# Patient Record
Sex: MALE | Race: BLACK OR AFRICAN AMERICAN | Employment: OTHER | URBAN - METROPOLITAN AREA
[De-identification: names, ages, dates, MRNs, and addresses within clinical notes are randomized per-mention and may not be internally consistent; named-entity substitution may affect disease eponyms.]

---

## 2017-04-04 ENCOUNTER — OFFICE VISIT (OUTPATIENT)
Dept: UROLOGY | Age: 57
End: 2017-04-04

## 2017-04-04 VITALS
OXYGEN SATURATION: 97 % | WEIGHT: 189 LBS | BODY MASS INDEX: 30.37 KG/M2 | DIASTOLIC BLOOD PRESSURE: 90 MMHG | SYSTOLIC BLOOD PRESSURE: 154 MMHG | HEIGHT: 66 IN | HEART RATE: 56 BPM

## 2017-04-04 DIAGNOSIS — R31.0 GROSS HEMATURIA: Primary | ICD-10-CM

## 2017-04-04 DIAGNOSIS — Z12.5 SCREENING FOR PROSTATE CANCER: ICD-10-CM

## 2017-04-04 LAB
BILIRUB UR QL STRIP: NORMAL
GLUCOSE UR-MCNC: NEGATIVE MG/DL
KETONES P FAST UR STRIP-MCNC: NEGATIVE MG/DL
PH UR STRIP: 7.5 [PH] (ref 4.6–8)
PROT UR QL STRIP: NORMAL MG/DL
SP GR UR STRIP: 1.02 (ref 1–1.03)
UA UROBILINOGEN AMB POC: NORMAL (ref 0.2–1)
URINALYSIS CLARITY POC: CLEAR
URINALYSIS COLOR POC: YELLOW
URINE BLOOD POC: NORMAL
URINE LEUKOCYTES POC: NEGATIVE
URINE NITRITES POC: NEGATIVE

## 2017-04-04 RX ORDER — GABAPENTIN 800 MG/1
800 TABLET ORAL 2 TIMES DAILY
Refills: 3 | COMMUNITY
Start: 2017-01-07 | End: 2017-06-20

## 2017-04-04 RX ORDER — LOSARTAN POTASSIUM 50 MG/1
50 TABLET ORAL DAILY
Refills: 1 | COMMUNITY
Start: 2017-03-15 | End: 2017-08-18

## 2017-04-04 RX ORDER — NAPROXEN 500 MG/1
500 TABLET ORAL 2 TIMES DAILY WITH MEALS
Refills: 0 | COMMUNITY
Start: 2016-12-27 | End: 2017-06-20

## 2017-04-04 NOTE — PROGRESS NOTES
RBV. Per Dr. Chato Cat lab drawn in office today for PSA for screening for prostate cancer and gross hematuria.    RBV. Per Dr. Chato Cat Patient to have urine sent for cytology.

## 2017-04-04 NOTE — PROGRESS NOTES
Mr. Faisal Faria has a reminder for a \"due or due soon\" health maintenance. I have asked that he contact his primary care provider for follow-up on this health maintenance. RBV. Per Dr. Jam Pressley Patient to have urine sent for cytology.

## 2017-04-04 NOTE — PROGRESS NOTES
Rasheeda     Chief Complaint   Patient presents with   [address] [place] [address] Patient    Gross Hematuria       History and Physical    The patient is a pleasant 56-year-old [place] American male who is sent for evaluation of gross hematuria. This occurred about a month ago. There was no unusual preceding injury or coughing, vomiting. The patient had been doing some pulling and pushing activities but this is pretty much his standard activity. The patient had visible blood in his urine and this continued for 2 or 3 more urinations that day. It cleared up on its own. The patient underwent CT urogram about 3 weeks ago and it was unremarkable. The patient noticed a similar episode about 2 weeks ago. The patient is not aware of whether this was initial hematuria terminal hematuria or total hematuria. There was no sense of obstruction or irritation. The patient does have chronic left flank pain but this is of an exertional and positional nature and his primary care provider has advised that this is muscular. There is no personal or family history of kidney stones and the patient is originally from nearby [place]. There is no family history of prostate issues or sickle cell. The patient denies any childhood issues. Puberty was at normal age. His adult life was totally without any type of genitourinary issues  The patient smokes perhaps 1 pack of cigarettes per week    Past Medical History:   Diagnosis Date    Hypertension      There is no problem list on file for this patient. Past Surgical History:   Procedure Laterality Date    HX ORTHOPAEDIC      spinal fusion     Current Outpatient Prescriptions   Medication Sig Dispense Refill    naproxen (NAPROSYN) 500 mg tablet 500 mg two (2) times daily (with meals). 0    losartan (COZAAR) 50 mg tablet 50 mg daily. 1    gabapentin (NEURONTIN) 800 mg tablet 800 mg two (2) times a day.   3     No Known Allergies  Social History     Social History    Marital status:      Spouse name: N/A    Number of children: N/A    Years of education: N/A     Occupational History    Not on file. Social History Main Topics    Smoking status: Current Every Day Smoker    Smokeless tobacco: Not on file    Alcohol use No    Drug use: No    Sexual activity: Yes     Other Topics Concern    Not on file     Social History Narrative    No narrative on file      Family History   Problem Relation Age of Onset    Cancer Mother     Cancer Father     Cancer Brother     Diabetes Brother            Visit Vitals    Ht 5' 6\" (1.676 m)    Wt 189 lb (85.7 kg)    BMI 30.51 kg/m2     Physical        Gen: WDWN adult NAD  Head  : normocephalic,  Normal ROM; eyes without normal pupils, EOMs, no masses;  conjunctiva normal  Neck: normal movement,  no evident mass,  No evident adenopathy, trachea midline,  Lungs clear to auscultation with no rales or ronchi or rubs  Cardiac NSR with no murmur, rub, extra sounds  Abd :bowel sounds normal, no masses, tenderness, organomegaly  Flanks     -penis is uncircumcised and normal.  Meatus normal.  Scrotal contents normal.  Rectal tone normal.  Prostate gland is 40 g smooth and symmetric with no abnormalities    Extremities- no edema, arthritis, deformity, swelling  Psych- oriented, no evident anxiety, no cognitive impairment evident    Urine today is trace positive for blood micro shows 10-15 red blood cells per high-powered field. There are some granular casts. There do appear to be some epithelial cells.                   Impression/ PLAN  microhematuria with reported episode of gross hematuria; low tobacco consumption history    Plan:  Urine for cytology  PSA  Patient to return with disks so I can review the CT urogram and we will probably need to proceed on with cystoscopy                This visit exceeded 30 minutes and >50% was counselling  The patient understands the discussion and plan    PLEASE NOTE:      This document has been produced using voice recognition software.   Unrecognized errors in transcription may be present    Tangela Obrien MD

## 2017-04-04 NOTE — PATIENT INSTRUCTIONS

## 2017-04-04 NOTE — MR AVS SNAPSHOT
Visit Information Date & Time Provider Department Dept. Phone Encounter #  
 4/4/2017  1:00 PM Irma Colmenares, [address] [place] [address] [facility] [phone] Follow-up Instructions Return in about 1 week (around 4/11/2017) for review ct disc. Follow-up and Disposition History Upcoming Health Maintenance Date Due Hepatitis C Screening 1960 DTaP/Tdap/Td series (1 - Tdap) 5/4/1983 FOBT Q 1 YEAR AGE 50-75 5/4/2012 INFLUENZA AGE 9 TO ADULT 8/1/2016 Allergies as of 4/4/2017  Review Complete On: 4/4/2017 By: Lanny Aldridge LPN No Known Allergies Current Immunizations  Never Reviewed No immunizations on file. Not reviewed this visit You Were Diagnosed With   
  
 Codes Comments Gross hematuria    -  Primary ICD-10-CM: R31.0 ICD-9-CM: 599.71 Screening for prostate cancer     ICD-10-CM: Z12.5 ICD-9-CM: V76.44 Vitals BP Pulse Height(growth percentile) Weight(growth percentile) SpO2 BMI  
 154/90 (BP 1 Location: Right arm, BP Patient Position: Sitting) (!) 56 5' 6\" (1.676 m) 189 lb (85.7 kg) 97% 30.51 kg/m2 Smoking Status Current Every Day Smoker Vitals History BMI and BSA Data Body Mass Index Body Surface Area 30.51 kg/m 2 2 m 2 Preferred Pharmacy Pharmacy Name Phone [address] [place] [address], [address] [place] [address] [phone] Your Updated Medication List  
  
   
This list is accurate as of: 4/4/17  1:39 PM.  Always use your most recent med list.  
  
  
  
  
 gabapentin 800 mg tablet Commonly known as:  NEURONTIN  
800 mg two (2) times a day. losartan 50 mg tablet Commonly known as:  COZAAR  
50 mg daily. naproxen 500 mg tablet Commonly known as:  NAPROSYN  
500 mg two (2) times daily (with meals). We Performed the Following AMB POC URINALYSIS DIP STICK AUTO W/O MICRO [61177 CPT(R)] MN COLLECTION VENOUS BLOOD,VENIPUNCTURE [id number] CPT(R)] Follow-up Instructions Return in about 1 week (around 4/11/2017) for review ct disc. To-Do List   
 04/04/2017 Pathology:  CYTOLOGY NON-GYN   
  
 04/04/2017 Lab:  PROSTATE SPECIFIC AG (PSA) Patient Instructions Blood in the Urine: Care Instructions Your Care Instructions Blood in the urine, or hematuria, may make the urine look red, brown, or pink. There may be blood every time you urinate or just from time to time. You cannot always see blood in the urine, but it will show up in a urine test. 
Blood in the urine may be serious. It should always be checked by a doctor. Your doctor may recommend more tests, including an X-ray, a CT scan, or a cystoscopy (which lets a doctor look inside the urethra and bladder). Blood in the urine can be a sign of another problem. Common causes are bladder infections and kidney stones. An injury to your groin or your genital area can also cause bleeding in the urinary tract. Very hard exercisesuch as running a marathoncan cause blood in the urine. Blood in the urine can also be a sign of kidney disease or cancer in the bladder or kidney. Many cases of blood in the urine are caused by a harmless condition that runs in families. This is called benign familial hematuria. It does not need any treatment. Sometimes your urine may look red or brown even though it does not contain blood. For example, not getting enough fluids (dehydration), taking certain medicines, or having a liver problem can change the color of your urine. Eating foods such as beets, rhubarb, or blackberries or foods with red food coloring can make your urine look red or pink. Follow-up care is a key part of your treatment and safety. Be sure to make and go to all appointments, and call your doctor if you are having problems. It's also a good idea to know your test results and keep a list of the medicines you take. When should you call for help? Call your doctor now or seek immediate medical care if: 
· You have symptoms of a urinary infection. For example: ¨ You have pus in your urine. ¨ You have pain in your back just below your rib cage. This is called flank pain. ¨ You have a fever, chills, or body aches. ¨ It hurts to urinate. ¨ You have groin or belly pain. · You have more blood in your urine. Watch closely for changes in your health, and be sure to contact your doctor if: 
· You have new urination problems. · You do not get better as expected. Where can you learn more? Go to http://tramaine-alis.info/. Enter K270 in the search box to learn more about \"Blood in the Urine: Care Instructions. \" Current as of: August 12, 2016 Content Version: 11.2 © 4899-8761 DailyDigital. Care instructions adapted under license by [employer] (which disclaims liability or warranty for this information). If you have questions about a medical condition or this instruction, always ask your healthcare professional. [address] any warranty or liability for your use of this information. Patient Instructions History Introducing [facility] & HEALTH SERVICES! Alba Davis introduces [employer] patient portal. Now you can access parts of your medical record, email your doctor's office, and request medication refills online. 1. In your internet browser, go to [email or web address] 2. Click on the First Time User? Click Here link in the Sign In box. You will see the New Member Sign Up page. 3. Enter your [employer] Access Code exactly as it appears below. You will not need to use this code after youve completed the sign-up process. If you do not sign up before the expiration date, you must request a new code. · [employer] Access Code: [email or web address] Expires: 7/3/2017 12:59 PM 
 
4.  Enter the last four digits of your Social Security Number (xxxx) and Date of Birth (mm/dd/yyyy) as indicated and click Submit. You will be taken to the next sign-up page. 5. Create a [employer] ID. This will be your [employer] login ID and cannot be changed, so think of one that is secure and easy to remember. 6. Create a [employer] password. You can change your password at any time. 7. Enter your Password Reset Question and Answer. This can be used at a later time if you forget your password. 8. Enter your e-mail address. You will receive e-mail notification when new information is available in [address] 9. Click Sign Up. You can now view and download portions of your medical record. 10. Click the Download Summary menu link to download a portable copy of your medical information. If you have questions, please visit the Frequently Asked Questions section of the [employer] website. Remember, [employer] is NOT to be used for urgent needs. For medical emergencies, dial 911. Now available from your iPhone and Android! Please provide this summary of care documentation to your next provider. Your primary care clinician is listed as Camila Arauz. If you have any questions after today's visit, please call [phone].

## 2017-04-05 LAB — PSA SERPL-MCNC: 0.4 NG/ML (ref 0–4)

## 2017-04-11 ENCOUNTER — OFFICE VISIT (OUTPATIENT)
Dept: UROLOGY | Age: 57
End: 2017-04-11

## 2017-04-11 VITALS
HEART RATE: 62 BPM | BODY MASS INDEX: 30.37 KG/M2 | SYSTOLIC BLOOD PRESSURE: 126 MMHG | WEIGHT: 189 LBS | OXYGEN SATURATION: 98 % | HEIGHT: 66 IN | DIASTOLIC BLOOD PRESSURE: 80 MMHG

## 2017-04-11 DIAGNOSIS — R31.0 GROSS HEMATURIA: Primary | ICD-10-CM

## 2017-04-11 NOTE — PROGRESS NOTES
Mckenzie Mckenzie    Chief Complaint   Patient presents with   Naya Reining Hematuria       History and Physical    The patient is a 46-year-old -American male with episode of gross hematuria coming now for completion of his hematuria evaluation    Past Medical History:   Diagnosis Date    Hypertension      There is no problem list on file for this patient. Past Surgical History:   Procedure Laterality Date    HX ORTHOPAEDIC      spinal fusion     Current Outpatient Prescriptions   Medication Sig Dispense Refill    naproxen (NAPROSYN) 500 mg tablet 500 mg two (2) times daily (with meals). 0    losartan (COZAAR) 50 mg tablet 50 mg daily. 1    gabapentin (NEURONTIN) 800 mg tablet 800 mg two (2) times a day. 3     No Known Allergies  Social History     Social History    Marital status:      Spouse name: N/A    Number of children: N/A    Years of education: N/A     Occupational History    Not on file.      Social History Main Topics    Smoking status: Current Every Day Smoker    Smokeless tobacco: Not on file    Alcohol use No    Drug use: No    Sexual activity: Yes     Other Topics Concern    Not on file     Social History Narrative      Family History   Problem Relation Age of Onset    Cancer Mother     Cancer Father     Cancer Brother     Diabetes Brother            Visit Vitals    /80 (BP 1 Location: Left arm, BP Patient Position: Sitting)    Pulse 62    Ht 5' 6\" (1.676 m)    Wt 189 lb (85.7 kg)    SpO2 98%    BMI 30.51 kg/m2     Physical        Gen: WDWN adult NAD  Head  : normocephalic,  Normal ROM; eyes without normal pupils, EOMs, no masses;  conjunctiva normal  Neck: normal movement,  no evident mass,  No evident adenopathy, trachea midline,  Lungs clear to auscultation with no rales or ronchi or rubs  Cardiac NSR with no murmur, rub, extra sounds  Abd :bowel sounds normal, no masses, tenderness, organomegaly  Flanks     -    Extremities- no edema, arthritis, deformity, swelling  Psych- oriented, no evident anxiety, no cognitive impairment evident    Cytology negative  PSA 0.4  CT scan shows normal upper tracts and a filling defect in the left bladder wall                  Impression/ PLAN  Gross hematuria with negative cytology but with filling defect in the left lateral wall 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 The patient does have a low level tobacco consumption history    Plan:  I talked with the patient about the alternatives. We will schedule the patient for anesthesia cystoscopy with biopsy and TURBT. If tumor is present, will give mitomycin-C. The patient is aware of the preparation, technique, and convalescence he is aware of the risks that include, but are not limited to, infection, blood loss, injury, failure to diagnose, finding of cancer, and the potential for recurrence along with the need for surveillance studies thereafter. He wishes to proceed and will be accordingly scheduled            This visit exceeded 25 minutes and >50% was counselling  The patient understands the discussion and plan    PLEASE NOTE:      This document has been produced using voice recognition software.   Unrecognized errors in transcription may be present    Teo Klein MD

## 2017-04-11 NOTE — PROGRESS NOTES
Mr. Shantanu Haji has a reminder for a \"due or due soon\" health maintenance. I have asked that he contact his primary care provider for follow-up on this health maintenance.

## 2017-04-11 NOTE — MR AVS SNAPSHOT
Visit Information Date & Time Provider Department Dept. Phone Encounter #  
 4/11/2017  1:00 PM Blas Salvador, [facility] [phone] [id number] Follow-up Instructions Return in about 3 weeks (around 5/2/2017) for biopsy. Follow-up and Disposition History Your Appointments 5/5/2017  9:30 AM  
Office Visit with Blas Salvador MD  
[facility] Appt Note: PO cysto w/biopsy [address] [place] [address] [address]  
[phone] [address] [facility] [address] Upcoming Health Maintenance Date Due Hepatitis C Screening 1960 Pneumococcal 19-64 Medium Risk (1 of 1 - PPSV23) 5/4/1981 DTaP/Tdap/Td series (1 - Tdap) 5/4/1983 FOBT Q 1 YEAR AGE 50-75 5/4/2012 INFLUENZA AGE 9 TO ADULT 8/1/2016 Allergies as of 4/11/2017  Review Complete On: 4/11/2017 By: Blas Salvador MD  
 No Known Allergies Current Immunizations  Never Reviewed No immunizations on file. Not reviewed this visit You Were Diagnosed With   
  
 Codes Comments Gross hematuria    -  Primary ICD-10-CM: R31.0 ICD-9-CM: 599.71 Vitals BP Pulse Height(growth percentile) Weight(growth percentile) SpO2 BMI  
 126/80 (BP 1 Location: Left arm, BP Patient Position: Sitting) 62 5' 6\" (1.676 m) 189 lb (85.7 kg) 98% 30.51 kg/m2 Smoking Status Current Every Day Smoker Vitals History BMI and BSA Data Body Mass Index Body Surface Area 30.51 kg/m 2 2 m 2 Preferred Pharmacy Pharmacy Name Phone [address], [address] [place] [address] [phone] Your Updated Medication List  
  
   
This list is accurate as of: 4/11/17  2:08 PM.  Always use your most recent med list.  
  
  
  
  
 gabapentin 800 mg tablet Commonly known as:  NEURONTIN  
800 mg two (2) times a day. losartan 50 mg tablet Commonly known as:  COZAAR  
50 mg daily. naproxen 500 mg tablet Commonly known as:  NAPROSYN  
500 mg two (2) times daily (with meals). We Performed the Following AMB POC URINALYSIS DIP STICK AUTO W/O MICRO [01044 CPT(R)] Follow-up Instructions Return in about 3 weeks (around 2017) for biopsy. Patient Instructions MyChart Activation Thank you for requesting access to Attunity. Please follow the instructions below to securely access and download your online medical record. Attunity allows you to send messages to your doctor, view your test results, renew your prescriptions, schedule appointments, and more. How Do I Sign Up? 1. In your internet browser, go to [email or web address]. 2. Click on the First Time User? Click Here link in the Sign In box. You will see the New Member Sign Up page. 3. Enter your [employer] Access Code exactly as it appears below. You will not need to use this code after youve completed the sign-up process. If you do not sign up before the expiration date, you must request a new code. Attunity Access Code: [email or web address] Expires: 7/3/2017 12:59 PM (This is the date your Attunity access code will ) 4. Enter the last four digits of your Social Security Number (xxxx) and Date of Birth (mm/dd/yyyy) as indicated and click Submit. You will be taken to the next sign-up page. 5. Create a Attunity ID. This will be your Attunity login ID and cannot be changed, so think of one that is secure and easy to remember. 6. Create a Attunity password. You can change your password at any time. 7. Enter your Password Reset Question and Answer. This can be used at a later time if you forget your password. 8. Enter your e-mail address. You will receive e-mail notification when new information is available in [address] 9. Click Sign Up. You can now view and download portions of your medical record. 10. Click the Download Summary menu link to download a portable copy of your medical information. Additional Information If you have questions, please visit the Frequently Asked Questions section of the [employer] website at [email or web address] Remember, [employer] is NOT to be used for urgent needs. For medical emergencies, dial 911. Blood in the Urine: Care Instructions Your Care Instructions Blood in the urine, or hematuria, may make the urine look red, brown, or pink. There may be blood every time you urinate or just from time to time. You cannot always see blood in the urine, but it will show up in a urine test. 
Blood in the urine may be serious. It should always be checked by a doctor. Your doctor may recommend more tests, including an X-ray, a CT scan, or a cystoscopy (which lets a doctor look inside the urethra and bladder). Blood in the urine can be a sign of another problem. Common causes are bladder infections and kidney stones. An injury to your groin or your genital area can also cause bleeding in the urinary tract. Very hard exercisesuch as running a marathoncan cause blood in the urine. Blood in the urine can also be a sign of kidney disease or cancer in the bladder or kidney. Many cases of blood in the urine are caused by a harmless condition that runs in families. This is called benign familial hematuria. It does not need any treatment. Sometimes your urine may look red or brown even though it does not contain blood. For example, not getting enough fluids (dehydration), taking certain medicines, or having a liver problem can change the color of your urine. Eating foods such as beets, rhubarb, or blackberries or foods with red food coloring can make your urine look red or pink. Follow-up care is a key part of your treatment and safety.  Be sure to make and go to all appointments, and call your doctor if you are having problems. It's also a good idea to know your test results and keep a list of the medicines you take. When should you call for help? Call your doctor now or seek immediate medical care if: 
· You have symptoms of a urinary infection. For example: ¨ You have pus in your urine. ¨ You have pain in your back just below your rib cage. This is called flank pain. ¨ You have a fever, chills, or body aches. ¨ It hurts to urinate. ¨ You have groin or belly pain. · You have more blood in your urine. Watch closely for changes in your health, and be sure to contact your doctor if: 
· You have new urination problems. · You do not get better as expected. Where can you learn more? Go to http://tramaine-alis.info/. Enter A151 in the search box to learn more about \"Blood in the Urine: Care Instructions. \" Current as of: August 12, 2016 Content Version: 11.2 © 4209-6135 Myreks. Care instructions adapted under license by [employer] (which disclaims liability or warranty for this information). If you have questions about a medical condition or this instruction, always ask your healthcare professional. [place] [address] any warranty or liability for your use of this information. Patient Instructions History Introducing hospitals & HEALTH SERVICES! Kierra Warren introduces [employer] patient portal. Now you can access parts of your medical record, email your doctor's office, and request medication refills online. 1. In your internet browser, go to [email or web address] 2. Click on the First Time User? Click Here link in the Sign In box. You will see the New Member Sign Up page. 3. Enter your [employer] Access Code exactly as it appears below. You will not need to use this code after youve completed the sign-up process. If you do not sign up before the expiration date, you must request a new code. · [employer] Access Code: [email or web address] Expires: 7/3/2017 12:59 PM 
 
4. Enter the last four digits of your Social Security Number (xxxx) and Date of Birth (mm/dd/yyyy) as indicated and click Submit. You will be taken to the next sign-up page. 5. Create a WorkCast ID. This will be your WorkCast login ID and cannot be changed, so think of one that is secure and easy to remember. 6. Create a WorkCast password. You can change your password at any time. 7. Enter your Password Reset Question and Answer. This can be used at a later time if you forget your password. 8. Enter your e-mail address. You will receive e-mail notification when new information is available in [address] 9. Click Sign Up. You can now view and download portions of your medical record. 10. Click the Download Summary menu link to download a portable copy of your medical information. If you have questions, please visit the Frequently Asked Questions section of the WorkCast website. Remember, [employer] is NOT to be used for urgent needs. For medical emergencies, dial 911. Now available from your iPhone and Android! Please provide this summary of care documentation to your next provider. Your primary care clinician is listed as Kylah Steven. If you have any questions after today's visit, please call [phone].

## 2017-04-18 RX ORDER — SODIUM CHLORIDE 9 MG/ML
125 INJECTION, SOLUTION INTRAVENOUS CONTINUOUS
Status: CANCELLED | OUTPATIENT
Start: 2017-04-18 | End: 2017-04-19

## 2017-04-18 RX ORDER — CIPROFLOXACIN 2 MG/ML
400 INJECTION, SOLUTION INTRAVENOUS ONCE
Status: CANCELLED | OUTPATIENT
Start: 2017-04-18 | End: 2017-04-18

## 2017-04-24 ENCOUNTER — DOCUMENTATION ONLY (OUTPATIENT)
Dept: UROLOGY | Age: 57
End: 2017-04-24

## 2017-04-24 NOTE — PROGRESS NOTES
Vernon Pendleton called 4/24/17 wanting to cancel his Cystoscopy with biopsy with mitomycin for 5/3/17. He states that he will call back to reschedule in about a month or so just doesn't want it done at this time.

## 2017-05-05 ENCOUNTER — TELEPHONE (OUTPATIENT)
Dept: UROLOGY | Age: 57
End: 2017-05-05

## 2017-05-05 NOTE — TELEPHONE ENCOUNTER
Called to advise patient he needs to have this procedure done. He told me his lab work through his doctor was normal and he 'was going to see how things went.'    Advised I would have our  call and get him back on the schedule.

## 2017-06-12 ENCOUNTER — OFFICE VISIT (OUTPATIENT)
Dept: UROLOGY | Age: 57
End: 2017-06-12

## 2017-06-12 VITALS
HEART RATE: 52 BPM | OXYGEN SATURATION: 98 % | SYSTOLIC BLOOD PRESSURE: 145 MMHG | WEIGHT: 186 LBS | DIASTOLIC BLOOD PRESSURE: 93 MMHG | BODY MASS INDEX: 29.89 KG/M2 | TEMPERATURE: 98 F | HEIGHT: 66 IN

## 2017-06-12 DIAGNOSIS — R31.29 MICROSCOPIC HEMATURIA: Primary | ICD-10-CM

## 2017-06-12 NOTE — PATIENT INSTRUCTIONS
Cystoscopy: Before Your Procedure  What is a cystoscopy? A cystoscopy is a procedure that lets a doctor look inside your bladder and urethra. The urethra is the tube that carries urine from the bladder to outside the body. The doctor uses a thin, lighted tool called a cystoscope. With this tool, he or she can look for kidney or bladder stones. The doctor can also look for tumors, bleeding, or infection. If you are in a clinic and you are awake, you will get gel to numb your urethra. This makes the procedure more comfortable. Then the doctor puts the tube into your urethra and moves it into your bladder. Next, the doctor fills your bladder with liquid. This helps him or her see better. It may cause you to feel pressure in your bladder area for a short time. If you are in the hospital, you may get medicine to make you sleep during the procedure. While you are asleep, the doctor can take samples of tissue. These will be checked for cancer and other problems. This is called a biopsy. If you have a biopsy, you may have a small amount of blood in your urine for several days. You may also need a catheter. It's a tube that drains urine from your bladder. Your doctor will take it out at your follow-up visit. Follow-up care is a key part of your treatment and safety. Be sure to make and go to all appointments, and call your doctor if you are having problems. It's also a good idea to know your test results and keep a list of the medicines you take. What happens before the procedure? Procedures can be stressful. This information will help you understand what you can expect. And it will help you safely prepare for your procedure. Preparing for the procedure  · Understand exactly what procedure is planned, along with the risks, benefits, and other options. · Tell your doctors ALL the medicines, vitamins, supplements, and herbal remedies you take.  Some of these can increase the risk of bleeding or interact with anesthesia. · If you take blood thinners, such as warfarin (Coumadin), clopidogrel (Plavix), or aspirin, be sure to talk to your doctor. He or she will tell you if you should stop taking these medicines before your procedure. Make sure that you understand exactly what your doctor wants you to do. · Your doctor will tell you which medicines to take or stop before your procedure. You may need to stop taking certain medicines a week or more before the procedure. So talk to your doctor as soon as you can. · If you have an advance directive, let your doctor know. It may include a living will and a durable power of  for health care. Bring a copy to the hospital. If you don't have one, you may want to prepare one. It lets your doctor and loved ones know your health care wishes. Doctors advise that everyone prepare these papers before any type of surgery or procedure. What happens on the day of the procedure? · Follow the instructions exactly about when to stop eating and drinking. If you don't, your procedure may be canceled. If your doctor told you to take your medicines on the day of your procedure, take them with only a sip of water. · Take a bath or shower before you come in for your procedure. Do not apply lotions, perfumes, deodorants, or nail polish. · Take off all jewelry and piercings. And take out contact lenses, if you wear them. At the hospital or surgery center  · Bring a picture ID. · You will be asked to empty your bladder just before the procedure. · You will be kept comfortable and safe by your anesthesia provider. The anesthesia may make you sleep. Or it may just numb the area being worked on.  · In most cases, the cystoscope is in the bladder for less than 10 minutes. But the entire test may take up to 45 minutes or longer. Going home  · Be sure you have someone to drive you home. Anesthesia and pain medicine make it unsafe for you to drive.   · You will be given more specific instructions about recovering from your procedure. They will cover things like diet, wound care, follow-up care, driving, and getting back to your normal routine. When should you call your doctor? · You have questions or concerns. · You don't understand how to prepare for your procedure. · You become ill before the procedure (such as fever, flu, or a cold). · You need to reschedule or have changed your mind about having the procedure. Where can you learn more? Go to http://tramaine-alis.info/. Enter S331 in the search box to learn more about \"Cystoscopy: Before Your Procedure. \"  Current as of: November 11, 2016  Content Version: 11.2  © 9578-8531 [employer]. Care instructions adapted under license by [employer] (which disclaims liability or warranty for this information). If you have questions about a medical condition or this instruction, always ask your healthcare professional. [address] any warranty or liability for your use of this information.

## 2017-06-12 NOTE — PROGRESS NOTES
Mr. Unique Linder has a reminder for a \"due or due soon\" health maintenance. I have asked that he contact his primary care provider for follow-up on this health maintenance.

## 2017-06-12 NOTE — PROGRESS NOTES
Pati Sasha    Chief Complaint   Patient presents with    Pre-op Exam       History and Physical    The patient is a pleasant 53-year-old -American male who was evaluated for episode of gross hematuria and a CT scan disclosed a filling defect in the left bladder wall. The patient comes now for preop assessment and is scheduled for cystoscopy with biopsy possible TURBT and possible mitomycin installation. Past Medical History:   Diagnosis Date    Hypertension      There is no problem list on file for this patient. Past Surgical History:   Procedure Laterality Date    HX CERVICAL FUSION  1996 & 2005    HX COLONOSCOPY       Current Outpatient Prescriptions   Medication Sig Dispense Refill    losartan (COZAAR) 50 mg tablet 50 mg daily. 1    gabapentin (NEURONTIN) 800 mg tablet 800 mg two (2) times a day. 3    naproxen (NAPROSYN) 500 mg tablet 500 mg two (2) times daily (with meals). 0     No Known Allergies  Social History     Social History    Marital status:      Spouse name: N/A    Number of children: N/A    Years of education: N/A     Occupational History    Not on file.      Social History Main Topics    Smoking status: Current Every Day Smoker     Packs/day: 0.25    Smokeless tobacco: Never Used    Alcohol use No    Drug use: No    Sexual activity: Yes     Other Topics Concern    Not on file     Social History Narrative      Family History   Problem Relation Age of Onset    Cancer Mother     Cancer Father     Cancer Brother     Diabetes Brother          ROS  General-no chills, fatigue, weight loss, fever  Psych no anxiety, depression, irritability, mood swings  HEENT-no headache, blurred vision, dizziness, hearing loss, congestion, sore throat  Lungs-no shortness of breath, hemoptysis, cough, pain  Cardiac-No irregular rhythms, no chest pain, syncope, dyspnea, nocturnal shortness of breath, edema  GI- no nausea, vomiting, diarrhea, constipation, cramping  - no dysuria, hematuria, stone passage, incontinence, altered stream  Ortho- muscle pain, cramping, joint pain, back pain, swelling  Skin- no rashes, dryness, here change, skin lesion changes  Neuro- no confusion, headache, seizure, weakness     Visit Vitals    BP (!) 145/93 (BP 1 Location: Left arm, BP Patient Position: Sitting)    Pulse (!) 52    Temp 98 °F (36.7 °C) (Oral)    Ht 5' 6\" (1.676 m)    Wt 186 lb (84.4 kg)    SpO2 98%    BMI 30.02 kg/m2     Physical        Gen: WDWN adult NAD  Head  : normocephalic,  Normal ROM; eyes without normal pupils, EOMs, no masses;  conjunctiva normal  Neck: normal movement,  no evident mass,  No evident adenopathy, trachea midline,  Lungs clear to auscultation with no rales or ronchi or rubs  Cardiac NSR with no murmur, rub, extra sounds. There is no rhythm abnormality  Abd :bowel sounds normal, no masses, tenderness, organomegaly  Flanks     -    Extremities- no edema, arthritis, deformity, swelling  Psych- oriented, no evident anxiety, no cognitive impairment evident                  Impression/ PLAN  Hematuria with bladder filling defect on imaging studies    Pain:  Patient is fully counseled regarding anesthesia cystoscopy with biopsy and transurethral resection of bladder tumor and instillation of mitomycin. The patient is aware of the preparation, technique, convalescence. He is aware of the risks that include, but are not limited to, infection, bleeding, failure to diagnose, injury, finding of cancer, and possible need for further procedures and surveillance and possible medication            This visit exceeded 25 minutes and >50% was counselling  The patient understands the discussion and plan    PLEASE NOTE:      This document has been produced using voice recognition software.   Unrecognized errors in transcription may be present    Stephy Simms MD

## 2017-06-12 NOTE — MR AVS SNAPSHOT
Visit Information Date & Time Provider Department Dept. Phone Encounter #  
 6/12/2017 10:15 AM Alvarado Rucker, [address] [id number] Your Appointments 6/23/2017  9:00 AM  
Office Visit with Alvarado Rucker MD  
[facility]) Appt Note: PO cysto w/biopsy [address] [place] [address] [address]  
[phone] [address] [facility] [address] Upcoming Health Maintenance Date Due Hepatitis C Screening 1960 Pneumococcal 19-64 Medium Risk (1 of 1 - PPSV23) 5/4/1981 DTaP/Tdap/Td series (1 - Tdap) 5/4/1983 FOBT Q 1 YEAR AGE 50-75 5/4/2012 INFLUENZA AGE 9 TO ADULT 8/1/2017 Allergies as of 6/12/2017  Review Complete On: 6/12/2017 By: Alvarado Rucker MD  
 No Known Allergies Current Immunizations  Never Reviewed No immunizations on file. Not reviewed this visit You Were Diagnosed With   
  
 Codes Comments Microscopic hematuria    -  Primary ICD-10-CM: R31.29 ICD-9-CM: 599.72 Vitals BP Pulse Temp Height(growth percentile) Weight(growth percentile) SpO2  
 (!) 145/93 (BP 1 Location: Left arm, BP Patient Position: Sitting) (!) 52 98 °F (36.7 °C) (Oral) 5' 6\" (1.676 m) 186 lb (84.4 kg) 98% BMI Smoking Status 30.02 kg/m2 Current Every Day Smoker Vitals History BMI and BSA Data Body Mass Index Body Surface Area 30.02 kg/m 2 1.98 m 2 Preferred Pharmacy Pharmacy Name Phone [address], [address] [place] [address] [phone] Your Updated Medication List  
  
   
This list is accurate as of: 6/12/17 10:46 AM.  Always use your most recent med list.  
  
  
  
  
 gabapentin 800 mg tablet Commonly known as:  NEURONTIN  
800 mg two (2) times a day. losartan 50 mg tablet Commonly known as:  COZAAR  
50 mg daily. naproxen 500 mg tablet Commonly known as:  NAPROSYN  
500 mg two (2) times daily (with meals). We Performed the Following AMB POC URINALYSIS DIP STICK AUTO W/O MICRO [67306 CPT(R)] Patient Instructions Cystoscopy: Before Your Procedure What is a cystoscopy? A cystoscopy is a procedure that lets a doctor look inside your bladder and urethra. The urethra is the tube that carries urine from the bladder to outside the body. The doctor uses a thin, lighted tool called a cystoscope. With this tool, he or she can look for kidney or bladder stones. The doctor can also look for tumors, bleeding, or infection. If you are in a clinic and you are awake, you will get gel to numb your urethra. This makes the procedure more comfortable. Then the doctor puts the tube into your urethra and moves it into your bladder. Next, the doctor fills your bladder with liquid. This helps him or her see better. It may cause you to feel pressure in your bladder area for a short time. If you are in the hospital, you may get medicine to make you sleep during the procedure. While you are asleep, the doctor can take samples of tissue. These will be checked for cancer and other problems. This is called a biopsy. If you have a biopsy, you may have a small amount of blood in your urine for several days. You may also need a catheter. It's a tube that drains urine from your bladder. Your doctor will take it out at your follow-up visit. Follow-up care is a key part of your treatment and safety. Be sure to make and go to all appointments, and call your doctor if you are having problems. It's also a good idea to know your test results and keep a list of the medicines you take. What happens before the procedure? Procedures can be stressful. This information will help you understand what you can expect. And it will help you safely prepare for your procedure. Preparing for the procedure · Understand exactly what procedure is planned, along with the risks, benefits, and other options. · Tell your doctors ALL the medicines, vitamins, supplements, and herbal remedies you take. Some of these can increase the risk of bleeding or interact with anesthesia. · If you take blood thinners, such as warfarin (Coumadin), clopidogrel (Plavix), or aspirin, be sure to talk to your doctor. He or she will tell you if you should stop taking these medicines before your procedure. Make sure that you understand exactly what your doctor wants you to do. · Your doctor will tell you which medicines to take or stop before your procedure. You may need to stop taking certain medicines a week or more before the procedure. So talk to your doctor as soon as you can. · If you have an advance directive, let your doctor know. It may include a living will and a durable power of  for health care. Bring a copy to the hospital. If you don't have one, you may want to prepare one. It lets your doctor and loved ones know your health care wishes. Doctors advise that everyone prepare these papers before any type of surgery or procedure. What happens on the day of the procedure? · Follow the instructions exactly about when to stop eating and drinking. If you don't, your procedure may be canceled. If your doctor told you to take your medicines on the day of your procedure, take them with only a sip of water. · Take a bath or shower before you come in for your procedure. Do not apply lotions, perfumes, deodorants, or nail polish. · Take off all jewelry and piercings. And take out contact lenses, if you wear them. At the hospital or surgery center · Bring a picture ID. · You will be asked to empty your bladder just before the procedure. · You will be kept comfortable and safe by your anesthesia provider. The anesthesia may make you sleep. Or it may just numb the area being worked on. · In most cases, the cystoscope is in the bladder for less than 10 minutes. But the entire test may take up to 45 minutes or longer. Going home · Be sure you have someone to drive you home. Anesthesia and pain medicine make it unsafe for you to drive. · You will be given more specific instructions about recovering from your procedure. They will cover things like diet, wound care, follow-up care, driving, and getting back to your normal routine. When should you call your doctor? · You have questions or concerns. · You don't understand how to prepare for your procedure. · You become ill before the procedure (such as fever, flu, or a cold). · You need to reschedule or have changed your mind about having the procedure. Where can you learn more? Go to http://tramaine-alis.info/. Enter Y042 in the search box to learn more about \"Cystoscopy: Before Your Procedure. \" Current as of: November 11, 2016 Content Version: 11.2 © 6358-9076 [employer]. Care instructions adapted under license by [employer] (which disclaims liability or warranty for this information). If you have questions about a medical condition or this instruction, always ask your healthcare professional. [place] [address] any warranty or liability for your use of this information. Patient Instructions History Introducing [facility]! [facility] introduces The Lions patient portal. Now you can access parts of your medical record, email your doctor's office, and request medication refills online. 1. In your internet browser, go to [email or web address] 2. Click on the First Time User? Click Here link in the Sign In box. You will see the New Member Sign Up page. 3. Enter your The Lions Access Code exactly as it appears below. You will not need to use this code after youve completed the sign-up process.  If you do not sign up before the expiration date, you must request a new code. · Sampa Access Code: [email or web address] Expires: 7/3/2017 12:59 PM 
 
4. Enter the last four digits of your Social Security Number (xxxx) and Date of Birth (mm/dd/yyyy) as indicated and click Submit. You will be taken to the next sign-up page. 5. Create a Sampa ID. This will be your Sampa login ID and cannot be changed, so think of one that is secure and easy to remember. 6. Create a Sampa password. You can change your password at any time. 7. Enter your Password Reset Question and Answer. This can be used at a later time if you forget your password. 8. Enter your e-mail address. You will receive e-mail notification when new information is available in [address] 9. Click Sign Up. You can now view and download portions of your medical record. 10. Click the Download Summary menu link to download a portable copy of your medical information. If you have questions, please visit the Frequently Asked Questions section of the Sampa website. Remember, Sampa is NOT to be used for urgent needs. For medical emergencies, dial 911. Now available from your iPhone and Android! Please provide this summary of care documentation to your next provider. Your primary care clinician is listed as Sofie Hernandez. If you have any questions after today's visit, please call [phone].

## 2017-06-13 ENCOUNTER — ANESTHESIA EVENT (OUTPATIENT)
Dept: SURGERY | Age: 57
End: 2017-06-13
Payer: MEDICARE

## 2017-06-13 LAB
BILIRUB UR QL STRIP: NEGATIVE
GLUCOSE UR-MCNC: NEGATIVE MG/DL
KETONES P FAST UR STRIP-MCNC: NEGATIVE MG/DL
PH UR STRIP: 5 [PH] (ref 4.6–8)
PROT UR QL STRIP: NEGATIVE MG/DL
SP GR UR STRIP: 1 (ref 1–1.03)
UA UROBILINOGEN AMB POC: NORMAL (ref 0.2–1)
URINALYSIS CLARITY POC: CLEAR
URINALYSIS COLOR POC: YELLOW
URINE BLOOD POC: NORMAL
URINE LEUKOCYTES POC: NEGATIVE
URINE NITRITES POC: NEGATIVE

## 2017-06-13 RX ORDER — SODIUM CHLORIDE 9 MG/ML
125 INJECTION, SOLUTION INTRAVENOUS CONTINUOUS
Status: CANCELLED | OUTPATIENT
Start: 2017-06-13 | End: 2017-06-14

## 2017-06-13 RX ORDER — CIPROFLOXACIN 2 MG/ML
400 INJECTION, SOLUTION INTRAVENOUS ONCE
Status: CANCELLED | OUTPATIENT
Start: 2017-06-13 | End: 2017-06-13

## 2017-06-14 ENCOUNTER — ANESTHESIA (OUTPATIENT)
Dept: SURGERY | Age: 57
End: 2017-06-14
Payer: MEDICARE

## 2017-06-14 ENCOUNTER — HOSPITAL ENCOUNTER (OUTPATIENT)
Age: 57
Setting detail: OUTPATIENT SURGERY
Discharge: HOME | End: 2017-06-14
Attending: UROLOGY | Admitting: UROLOGY
Payer: MEDICARE

## 2017-06-14 VITALS
TEMPERATURE: 96.9 F | SYSTOLIC BLOOD PRESSURE: 125 MMHG | BODY MASS INDEX: 29.57 KG/M2 | WEIGHT: 184 LBS | HEART RATE: 64 BPM | RESPIRATION RATE: 16 BRPM | DIASTOLIC BLOOD PRESSURE: 78 MMHG | HEIGHT: 66 IN | OXYGEN SATURATION: 97 %

## 2017-06-14 LAB
BUN BLD-MCNC: 12 MG/DL (ref 7–18)
CHLORIDE BLD-SCNC: 110 MMOL/L (ref 100–108)
GLUCOSE BLD STRIP.AUTO-MCNC: 105 MG/DL (ref 74–106)
HCT VFR BLD CALC: 44 % (ref 36–49)
HGB BLD-MCNC: 15 G/DL (ref 12–16)
POTASSIUM BLD-SCNC: 3.5 MMOL/L (ref 3.5–5.5)
SODIUM BLD-SCNC: 139 MMOL/L (ref 136–145)

## 2017-06-14 PROCEDURE — 77030019927 HC TBNG IRR CYSTO BAXT -A: Performed by: UROLOGY

## 2017-06-14 PROCEDURE — 76010000138 HC OR TIME 0.5 TO 1 HR: Performed by: UROLOGY

## 2017-06-14 PROCEDURE — 77030010509 HC AIRWY LMA MSK TELE -A: Performed by: ANESTHESIOLOGY

## 2017-06-14 PROCEDURE — 88305 TISSUE EXAM BY PATHOLOGIST: CPT | Performed by: UROLOGY

## 2017-06-14 PROCEDURE — 76210000006 HC OR PH I REC 0.5 TO 1 HR: Performed by: UROLOGY

## 2017-06-14 PROCEDURE — 77030005520 HC CATH URETH FOL38 BARD -A: Performed by: UROLOGY

## 2017-06-14 PROCEDURE — 74011000250 HC RX REV CODE- 250: Performed by: NURSE ANESTHETIST, CERTIFIED REGISTERED

## 2017-06-14 PROCEDURE — 76060000032 HC ANESTHESIA 0.5 TO 1 HR: Performed by: UROLOGY

## 2017-06-14 PROCEDURE — 74011250636 HC RX REV CODE- 250/636

## 2017-06-14 PROCEDURE — 76210000021 HC REC RM PH II 0.5 TO 1 HR: Performed by: UROLOGY

## 2017-06-14 PROCEDURE — 77030032490 HC SLV COMPR SCD KNE COVD -B: Performed by: UROLOGY

## 2017-06-14 PROCEDURE — 77030020782 HC GWN BAIR PAWS FLX 3M -B: Performed by: UROLOGY

## 2017-06-14 PROCEDURE — 74011250636 HC RX REV CODE- 250/636: Performed by: NURSE ANESTHETIST, CERTIFIED REGISTERED

## 2017-06-14 PROCEDURE — 77030018836 HC SOL IRR NACL ICUM -A: Performed by: UROLOGY

## 2017-06-14 PROCEDURE — 82947 ASSAY GLUCOSE BLOOD QUANT: CPT

## 2017-06-14 PROCEDURE — 77030012863 HC BG URIN LEG HOLL -A: Performed by: UROLOGY

## 2017-06-14 PROCEDURE — 77030029290 HC ELECTRD LP CUT OCOA -F: Performed by: UROLOGY

## 2017-06-14 PROCEDURE — 74011000250 HC RX REV CODE- 250

## 2017-06-14 PROCEDURE — 74011250636 HC RX REV CODE- 250/636: Performed by: UROLOGY

## 2017-06-14 PROCEDURE — 77030020015 HC EVAC BLDR UROVAC BSC -B: Performed by: UROLOGY

## 2017-06-14 RX ORDER — DOCUSATE SODIUM 100 MG/1
100 CAPSULE, LIQUID FILLED ORAL 2 TIMES DAILY
Qty: 28 CAP | Refills: 2 | Status: SHIPPED | OUTPATIENT
Start: 2017-06-14 | End: 2017-08-18

## 2017-06-14 RX ORDER — MAGNESIUM SULFATE 100 %
4 CRYSTALS MISCELLANEOUS AS NEEDED
Status: DISCONTINUED | OUTPATIENT
Start: 2017-06-14 | End: 2017-06-14 | Stop reason: HOSPADM

## 2017-06-14 RX ORDER — SODIUM CHLORIDE, SODIUM LACTATE, POTASSIUM CHLORIDE, CALCIUM CHLORIDE 600; 310; 30; 20 MG/100ML; MG/100ML; MG/100ML; MG/100ML
75 INJECTION, SOLUTION INTRAVENOUS CONTINUOUS
Status: DISCONTINUED | OUTPATIENT
Start: 2017-06-14 | End: 2017-06-14 | Stop reason: HOSPADM

## 2017-06-14 RX ORDER — FENTANYL CITRATE 50 UG/ML
INJECTION, SOLUTION INTRAMUSCULAR; INTRAVENOUS AS NEEDED
Status: DISCONTINUED | OUTPATIENT
Start: 2017-06-14 | End: 2017-06-14 | Stop reason: HOSPADM

## 2017-06-14 RX ORDER — CIPROFLOXACIN 2 MG/ML
400 INJECTION, SOLUTION INTRAVENOUS ONCE
Status: COMPLETED | OUTPATIENT
Start: 2017-06-14 | End: 2017-06-14

## 2017-06-14 RX ORDER — ONDANSETRON 2 MG/ML
4 INJECTION INTRAMUSCULAR; INTRAVENOUS ONCE
Status: DISCONTINUED | OUTPATIENT
Start: 2017-06-14 | End: 2017-06-14 | Stop reason: HOSPADM

## 2017-06-14 RX ORDER — GLYCOPYRROLATE 0.2 MG/ML
INJECTION INTRAMUSCULAR; INTRAVENOUS AS NEEDED
Status: DISCONTINUED | OUTPATIENT
Start: 2017-06-14 | End: 2017-06-14 | Stop reason: HOSPADM

## 2017-06-14 RX ORDER — CIPROFLOXACIN 500 MG/1
500 TABLET ORAL 2 TIMES DAILY
Qty: 14 TAB | Refills: 0 | Status: SHIPPED | OUTPATIENT
Start: 2017-06-14 | End: 2017-06-21

## 2017-06-14 RX ORDER — SODIUM CHLORIDE 0.9 % (FLUSH) 0.9 %
5-10 SYRINGE (ML) INJECTION AS NEEDED
Status: DISCONTINUED | OUTPATIENT
Start: 2017-06-14 | End: 2017-06-14 | Stop reason: HOSPADM

## 2017-06-14 RX ORDER — PROPOFOL 10 MG/ML
INJECTION, EMULSION INTRAVENOUS AS NEEDED
Status: DISCONTINUED | OUTPATIENT
Start: 2017-06-14 | End: 2017-06-14 | Stop reason: HOSPADM

## 2017-06-14 RX ORDER — DEXAMETHASONE SODIUM PHOSPHATE 4 MG/ML
INJECTION, SOLUTION INTRA-ARTICULAR; INTRALESIONAL; INTRAMUSCULAR; INTRAVENOUS; SOFT TISSUE AS NEEDED
Status: DISCONTINUED | OUTPATIENT
Start: 2017-06-14 | End: 2017-06-14 | Stop reason: HOSPADM

## 2017-06-14 RX ORDER — LIDOCAINE HYDROCHLORIDE 20 MG/ML
INJECTION, SOLUTION EPIDURAL; INFILTRATION; INTRACAUDAL; PERINEURAL AS NEEDED
Status: DISCONTINUED | OUTPATIENT
Start: 2017-06-14 | End: 2017-06-14 | Stop reason: HOSPADM

## 2017-06-14 RX ORDER — HYDROMORPHONE HYDROCHLORIDE 2 MG/ML
0.5 INJECTION, SOLUTION INTRAMUSCULAR; INTRAVENOUS; SUBCUTANEOUS AS NEEDED
Status: DISCONTINUED | OUTPATIENT
Start: 2017-06-14 | End: 2017-06-14 | Stop reason: HOSPADM

## 2017-06-14 RX ORDER — DEXTROSE 50 % IN WATER (D50W) INTRAVENOUS SYRINGE
25-50 AS NEEDED
Status: DISCONTINUED | OUTPATIENT
Start: 2017-06-14 | End: 2017-06-14 | Stop reason: HOSPADM

## 2017-06-14 RX ORDER — HYDROCODONE BITARTRATE AND ACETAMINOPHEN 5; 325 MG/1; MG/1
1 TABLET ORAL
Qty: 24 TAB | Refills: 0 | Status: SHIPPED | OUTPATIENT
Start: 2017-06-14 | End: 2017-06-20

## 2017-06-14 RX ORDER — EPHEDRINE SULFATE/0.9% NACL/PF 25 MG/5 ML
SYRINGE (ML) INTRAVENOUS AS NEEDED
Status: DISCONTINUED | OUTPATIENT
Start: 2017-06-14 | End: 2017-06-14 | Stop reason: HOSPADM

## 2017-06-14 RX ORDER — ONDANSETRON 2 MG/ML
INJECTION INTRAMUSCULAR; INTRAVENOUS AS NEEDED
Status: DISCONTINUED | OUTPATIENT
Start: 2017-06-14 | End: 2017-06-14 | Stop reason: HOSPADM

## 2017-06-14 RX ORDER — MIDAZOLAM HYDROCHLORIDE 1 MG/ML
INJECTION, SOLUTION INTRAMUSCULAR; INTRAVENOUS AS NEEDED
Status: DISCONTINUED | OUTPATIENT
Start: 2017-06-14 | End: 2017-06-14 | Stop reason: HOSPADM

## 2017-06-14 RX ORDER — SODIUM CHLORIDE 0.9 % (FLUSH) 0.9 %
5-10 SYRINGE (ML) INJECTION EVERY 8 HOURS
Status: DISCONTINUED | OUTPATIENT
Start: 2017-06-14 | End: 2017-06-14 | Stop reason: HOSPADM

## 2017-06-14 RX ADMIN — PROPOFOL 200 MG: 10 INJECTION, EMULSION INTRAVENOUS at 07:42

## 2017-06-14 RX ADMIN — ONDANSETRON 4 MG: 2 INJECTION INTRAMUSCULAR; INTRAVENOUS at 07:49

## 2017-06-14 RX ADMIN — FENTANYL CITRATE 50 MCG: 50 INJECTION, SOLUTION INTRAMUSCULAR; INTRAVENOUS at 07:42

## 2017-06-14 RX ADMIN — GLYCOPYRROLATE 0.2 MG: 0.2 INJECTION INTRAMUSCULAR; INTRAVENOUS at 07:31

## 2017-06-14 RX ADMIN — FENTANYL CITRATE 50 MCG: 50 INJECTION, SOLUTION INTRAMUSCULAR; INTRAVENOUS at 07:31

## 2017-06-14 RX ADMIN — CIPROFLOXACIN 400 MG: 2 INJECTION, SOLUTION INTRAVENOUS at 07:31

## 2017-06-14 RX ADMIN — MIDAZOLAM HYDROCHLORIDE 2 MG: 1 INJECTION, SOLUTION INTRAMUSCULAR; INTRAVENOUS at 07:31

## 2017-06-14 RX ADMIN — FAMOTIDINE 20 MG: 10 INJECTION, SOLUTION INTRAVENOUS at 06:02

## 2017-06-14 RX ADMIN — SODIUM CHLORIDE, SODIUM LACTATE, POTASSIUM CHLORIDE, AND CALCIUM CHLORIDE 75 ML/HR: 600; 310; 30; 20 INJECTION, SOLUTION INTRAVENOUS at 06:02

## 2017-06-14 RX ADMIN — Medication 10 MG: at 07:56

## 2017-06-14 RX ADMIN — LIDOCAINE HYDROCHLORIDE 60 MG: 20 INJECTION, SOLUTION EPIDURAL; INFILTRATION; INTRACAUDAL; PERINEURAL at 07:42

## 2017-06-14 RX ADMIN — DEXAMETHASONE SODIUM PHOSPHATE 8 MG: 4 INJECTION, SOLUTION INTRA-ARTICULAR; INTRALESIONAL; INTRAMUSCULAR; INTRAVENOUS; SOFT TISSUE at 07:49

## 2017-06-14 NOTE — DISCHARGE INSTRUCTIONS
Cystoscopy: What to Expect at [address] [facility]    A cystoscopy is a procedure that lets a doctor look inside of the bladder and the urethra. The urethra is the tube that carries urine from the bladder to outside the body. The doctor uses a thin, lighted tool called a cystoscope. Your bladder is filled with fluid. This stretches the bladder so that your doctor can look closely at the inside of your bladder. After the cystoscopy, your urethra may be sore at first, and it may burn when you urinate for the first few days after the procedure. You may feel the need to urinate more often, and your urine may be pink. These symptoms should get better in 1 or 2 days. You will probably be able to go back to most of your usual activities in 1 or 2 days. This care sheet gives you a general idea about how long it will take for you to recover. But each person recovers at a different pace. Follow the steps below to get better as quickly as possible. How can you care for yourself at home? Activity  · Rest when you feel tired. Getting enough sleep will help you recover. · Try to walk each day. Start by walking a little more than you did the day before. Bit by bit, increase the amount you walk. Walking boosts blood flow and helps prevent pneumonia and constipation. · Avoid strenuous activities, such as bicycle riding, jogging, weight lifting, or aerobic exercise, until your doctor says it is okay. · Ask your doctor when you can drive again. · Most people are able to return to work within 1 or 2 days after the procedure. · You may shower and take baths as usual.  · Ask your doctor when it is okay for you to have sex. Diet  · You can eat your normal diet. If your stomach is upset, try bland, low-fat foods like plain rice, broiled chicken, toast, and yogurt. · Drink plenty of fluids (unless your doctor tells you not to). Medicines  · Take pain medicines exactly as directed.   ¨ If the doctor gave you a prescription medicine for pain, take it as prescribed. ¨ If you are not taking a prescription pain medicine, ask your doctor if you can take an over-the-counter medicine. · If you think your pain medicine is making you sick to your stomach:  ¨ Take your medicine after meals (unless your doctor has told you not to). ¨ Ask your doctor for a different pain medicine. · If your doctor prescribed antibiotics, take them as directed. Do not stop taking them just because you feel better. You need to take the full course of antibiotics. Follow-up care is a key part of your treatment and safety. Be sure to make and go to all appointments, and call your doctor if you are having problems. It's also a good idea to know your test results and keep a list of the medicines you take. When should you call for help? Call 911 anytime you think you may need emergency care. For example, call if:  · You passed out (lost consciousness). · You have severe trouble breathing. · You have sudden chest pain and shortness of breath, or you cough up blood. · You have severe belly pain. Call your doctor now or seek immediate medical care if:  · You are sick to your stomach or cannot keep fluids down. · Your urine is still red or you see blood clots after you have urinated several times. · You have trouble passing urine or stool, especially if you have pain or swelling in your lower belly. · You have signs of a blood clot, such as:  ¨ Pain in your calf, back of the knee, thigh, or groin. ¨ Redness and swelling in your leg or groin. · You develop a fever or severe chills. · You have pain in your back just below your rib cage. This is called flank pain. Watch closely for changes in your health, and be sure to contact your doctor if:  · You have pain or burning when you urinate. A burning feeling is normal for a day or two after the test, but call if it does not get better.   · You have a frequent urge to urinate but can pass only small amounts of urine.  · Your urine is pink, red, or cloudy, or smells bad. It is normal for the urine to have a pinkish color for a few days after the test, but call if it does not get better. Where can you learn more? Go to http://tramaine-alis.info/. Enter A977 in the search box to learn more about \"Cystoscopy: What to Expect at Home. \"  Current as of: November 11, 2016  Content Version: 11.2  © 6308-0074 [employer]. Care instructions adapted under license by [employer] (which disclaims liability or warranty for this information). If you have questions about a medical condition or this instruction, always ask your healthcare professional. [address] any warranty or liability for your use of this information. Learning About Urinary Catheter Care to Prevent Infection  What is a urinary catheter? A urinary catheter is a flexible plastic tube used to drain urine from your bladder when you can't urinate on your own. The catheter allows urine to drain from the bladder into a bag. Two types of drainage bags may be used with a urinary catheter. · A bedside bag is a large bag that you can hang on the side of your bed or on a chair. You can use it overnight or anytime you will be sitting or lying down for a long time. · A leg bag is a small bag that you can use during the day. It is usually attached to your thigh or calf and hidden under your clothes. Having a urinary catheter increases your risk of getting a urinary tract infection. Germs may get on the catheter and cause an infection in your bladder or kidneys. The longer you have a catheter, the more likely it is that you will get an infection. You can help prevent this problem with good hygiene and careful handling of your catheter and drainage bags. How can you help prevent infection? Take care to be clean  · Always wash your hands well before and after you handle your catheter.   · Clean the skin around the catheter twice a day using soap and water. Dry with a clean towel afterward. You can shower with your catheter and drainage bag in place unless your doctor told you not to. · When you clean around the catheter, check the surrounding skin for signs of infection. Look for things like pus or irritated, swollen, red, or tender skin around the catheter. Be careful with your drainage bag  · Always keep the drainage bag below the level of your bladder. This will help keep urine from flowing back into your bladder. · Check often to see that urine is flowing through the catheter into the drainage bag. · Empty the drainage bag when it is half full. This will keep it from overflowing or backing up. · When you empty the drainage bag, do not let the tubing or drain spout touch anything. Be careful with your catheter  · Do not unhook the catheter from the drain tube. That could let germs get into the tube. · Make sure that the catheter tubing does not get twisted or kinked. · Do not tug or pull on the catheter. And make sure that the drainage bag does not drag or pull on the catheter. · Do not put powder or lotion on the skin around the catheter. · Do not have sexual intercourse while wearing a catheter. How do you empty a urine drainage bag? .  If your doctor has asked you to keep a record, write down the amount of urine in the bag before you empty it. Wash your hands before and after you touch the bag. 1. Remove the drain spout from its sleeve at the bottom of the drainage bag.  2. Open the valve on the drain spout. Let the urine flow out into the toilet or a container. Be careful not to let the tubing or drain spout touch anything. 3. After you empty the bag, wipe off any liquid on the end of the drain spout. Close the valve. Then put the drain spout back into its sleeve at the bottom of the collection bag. How do you change from a bedside bag to a leg bag?   Wash your hands before and after you handle the bags. 1. Empty the bag attached to the catheter. 2. Put a clean towel under the catheter where it connects to the bag.  3. Fold and pinch the catheter closed to keep urine from leaking out. Many catheters have a clip you can use to pinch the tube closed. 4. Remove the used bag from the catheter. 5. Use an alcohol wipe to clean the tip of the leg bag. Then connect the leg bag to the catheter. 6. Strap the leg bag to your thigh or calf. Be sure the straps are not too tight. How can you clean a drainage bag? Clean your bags every day. Many people clean their bedside bag in the morning when they switch to a leg bag. At night, they attach the bedside bag and clean the leg bag. To clean a drainage ba. Remove the bag from the catheter. 2. Fill the bag with 2 parts vinegar and 3 parts water. Let it stand for 20 minutes. 3. Empty the bag, and let it air dry. When should you call for help? Call your doctor now or seek immediate medical care if:  · You have symptoms of a urinary infection. These may include:  ¨ Pain or burning when you urinate. ¨ A frequent need to urinate without being able to pass much urine. ¨ Pain in the flank, which is just below the rib cage and above the waist on either side of the back. ¨ Blood in your urine. ¨ A fever. · Your urine smells bad. · You see large blood clots in your urine. · No urine or very little urine is flowing into the bag for 4 or more hours. Watch closely for changes in your health, and be sure to contact your doctor if:  · The area around the catheter becomes irritated, swollen, red, or tender, or there is pus draining from it. · Urine is leaking from the place where the catheter enters your body. Follow-up care is a key part of your treatment and safety. Be sure to make and go to all appointments, and call your doctor if you are having problems. It's also a good idea to know your test results and keep a list of the medicines you take.   Where can you learn more? Go to http://tramaine-alis.info/. Enter C910 in the search box to learn more about \"Learning About Urinary Catheter Care to Prevent Infection. \"  Current as of: August 12, 2016  Content Version: 11.2  © 5923-6807 [employer]. Care instructions adapted under license by [employer] (which disclaims liability or warranty for this information). If you have questions about a medical condition or this instruction, always ask your healthcare professional. [place] [address] any warranty or liability for your use of this information. DISCHARGE SUMMARY from Nurse    The following personal items are in your possession at time of discharge:    Dental Appliances: None  Visual Aid: Glasses        Jewelry: None  Clothing: Pants, Shirt, Undergarments, Footwear  Other Valuables: Cell Phone, Wallet     PATIENT INSTRUCTIONS:    After general anesthesia or intravenous sedation, for 24 hours or while taking prescription Narcotics:  · Limit your activities  · Do not drive and operate hazardous machinery  · Do not make important personal or business decisions  · Do  not drink alcoholic beverages  · If you have not urinated within 8 hours after discharge, please contact your surgeon on call. Report the following to your surgeon:  · Excessive pain, swelling, redness or odor of or around the surgical area  · Temperature over 100.5  · Nausea and vomiting lasting longer than 4 hours or if unable to take medications  · Any signs of decreased circulation or nerve impairment to extremity: change in color, persistent  numbness, tingling, coldness or increase pain  · Any questions  *  Please give a list of your current medications to your Primary Care Provider. *  Please update this list whenever your medications are discontinued, doses are      changed, or new medications (including over-the-counter products) are added.     *  Please carry medication information at all times in case of emergency situations. These are general instructions for a healthy lifestyle:    No smoking/ No tobacco products/ Avoid exposure to second hand smoke    Surgeon General's Warning:  Quitting smoking now greatly reduces serious risk to your health. Obesity, smoking, and sedentary lifestyle greatly increases your risk for illness    A healthy diet, regular physical exercise & weight monitoring are important for maintaining a healthy lifestyle    You may be retaining fluid if you have a history of heart failure or if you experience any of the following symptoms:  Weight gain of 3 pounds or more overnight or 5 pounds in a week, increased swelling in our hands or feet or shortness of breath while lying flat in bed. Please call your doctor as soon as you notice any of these symptoms; do not wait until your next office visit. Recognize signs and symptoms of STROKE:    F-face looks uneven    A-arms unable to move or move unevenly    S-speech slurred or non-existent    T-time-call 911 as soon as signs and symptoms begin-DO NOT go       Back to bed or wait to see if you get better-TIME IS BRAIN. Warning Signs of HEART ATTACK     Call 911 if you have these symptoms:   Chest discomfort. Most heart attacks involve discomfort in the center of the chest that lasts more than a few minutes, or that goes away and comes back. It can feel like uncomfortable pressure, squeezing, fullness, or pain.  Discomfort in other areas of the upper body. Symptoms can include pain or discomfort in one or both arms, the back, neck, jaw, or stomach.  Shortness of breath with or without chest discomfort.  Other signs may include breaking out in a cold sweat, nausea, or lightheadedness. Don't wait more than five minutes to call 911 - MINUTES MATTER! Fast action can save your life. Calling 911 is almost always the fastest way to get lifesaving treatment.  Emergency Medical Services staff can begin treatment when they arrive -- up to an hour sooner than if someone gets to the hospital by car. The discharge information has been reviewed with the patient and spouse. The patient and spouse verbalized understanding. Discharge medications reviewed with the patient and spouse and appropriate educational materials and side effects teaching were provided. Laxative, Stool Softeners (Doculax, Colace, Colace Clear, DSS) - (By mouth)   Why this medicine is used:   Treats constipation by helping you have a bowel movement. Contact a nurse or doctor right away if you have:  · Dark urine or pale stools  · Vomiting, loss of appetite, stomach pain  · Yellow skin or eyes     Common side effects:  · Nausea, diarrhea, stomach cramps, bitter taste in mouth  © 2017 [address] is for End User's use only and may not be sold, redistributed or otherwise used for commercial purposes. Ciprofloxacin (By mouth)   Ciprofloxacin (qig-olq-HUBC-a-sin)  Treats infections and plague. This medicine is a quinolone antibiotic. Brand Name(s): Cipro   There may be other brand names for this medicine. When This Medicine Should Not Be Used: This medicine is not right for everyone. Do not use it if you had an allergic reaction to ciprofloxacin or to similar medicines. How to Use This Medicine:   Liquid, Tablet, Long Acting Tablet  · Your doctor will tell you how much medicine to use. Do not use more than directed. Take this medicine at the same time each day. · You may take this medicine with or without food. Do not take this medicine with only a source of calcium, such as milk, yogurt, or juice that contains added calcium. You may have foods or drinks that contain calcium as part of a larger meal.  · Swallow the extended-release tablet whole. Do not crush, break, or chew it. · Oral liquid: Shake for at least 15 seconds just before each use. The liquid has small beads floating in it.  Do not chew the beads when you drink the liquid. Measure the oral liquid medicine with a marked measuring spoon, oral syringe, or medicine cup. · Tablet: Swallow whole. Do not break, crush, or chew it. · Drink extra fluids so you will urinate more often and help prevent kidney problems. · Take all of the medicine in your prescription to clear up your infection, even if you feel better after the first few doses. · This medicine should come with a Medication Guide. Ask your pharmacist for a copy if you do not have one. · Missed dose: Take a dose as soon as you remember. If it is almost time for your next dose, wait until then and take a regular dose. Do not take extra medicine to make up for a missed dose. · Store the medicine in a closed container at room temperature, away from heat, moisture, and direct light. Throw away any leftover liquid medicine after 14 days. Drugs and Foods to Avoid:   Ask your doctor or pharmacist before using any other medicine, including over-the-counter medicines, vitamins, and herbal products. · Do not use this medicine together with tizanidine. · Some foods and medicines can affect how ciprofloxacin works.  Tell your doctor if you are using any of the following:  ¨ Clozapine, cyclosporine, duloxetine, lidocaine, methotrexate, olanzapine, pentoxifylline, phenytoin, probenecid, ropinirole, sildenafil, theophylline  ¨ Antibiotic (including azithromycin, clarithromycin, erythromycin)  ¨ Blood thinner (including warfarin)  ¨ Diabetes medicine (including glimepiride, glyburide)  ¨ Medicine for depression or mental illness  ¨ Medicine for heart rhythm problems (including amiodarone, procainamide, quinidine, sotalol)  ¨ NSAID pain medicine (including aspirin, celecoxib, diclofenac, ibuprofen, naproxen)  ¨ Steroid medicine (including hydrocortisone, methylprednisolone, prednisone)  · Take ciprofloxacin at least 2 hours before or 6 hours after you take antacids containing aluminum or magnesium, calcium, zinc, iron, lanthanum, sevelamer, sucralfate, and didanosine. This includes vitamin/mineral supplements. · This medicine slows the digestion of caffeine, so it might affect you for longer than normal.  Warnings While Using This Medicine:   · Tell your doctor if you are pregnant or breastfeeding, or if you have kidney disease, liver disease, diabetes, heart disease, myasthenia gravis, or a history of heart rhythm problems (such as prolonged QT interval) or seizures. Tell your doctor if you have ever had tendon or joint problems, including rheumatoid arthritis, or if you have received a transplant. · This medicine may cause the following problems:  ¨ Tendinitis and tendon rupture (may happen after treatment ends)  ¨ Liver damage  ¨ Nerve damage in the arms or legs  ¨ Heart rhythm changes  ¨ Changes in blood sugar levels  · This medicine may make you dizzy, drowsy, or lightheaded. Do not drive or do anything that could be dangerous until you know how this medicine affects you. · This medicine can cause diarrhea. Call your doctor if the diarrhea becomes severe, does not stop, or is bloody. Do not take any medicine to stop diarrhea until you have talked to your doctor. Diarrhea can occur 2 months or more after you stop taking this medicine. · This medicine may make your skin more sensitive to sunlight. Wear sunscreen. Do not use sunlamps or tanning beds. · Call your doctor if your symptoms do not improve or if they get worse. · Keep all medicine out of the reach of children. Never share your medicine with anyone.   Possible Side Effects While Using This Medicine:   Call your doctor right away if you notice any of these side effects:  · Allergic reaction: Itching or hives, swelling in your face or hands, swelling or tingling in your mouth or throat, chest tightness, trouble breathing  · Blistering, peeling, red skin rash  · Dark-colored urine or pale stools, nausea, vomiting, loss of appetite, pain in your upper stomach, yellow skin or eyes  · Diarrhea that may contain blood  · Fainting, dizziness, or lightheadedness  · Fast, slow, or uneven heartbeat  · Numbness, tingling, weakness, or burning pain in your hands, arms, legs, or feet  · Pain, stiffness, swelling, or bruises around your ankle, leg, shoulder, or other joint  · Seizures, severe headache, unusual thoughts or behaviors, trouble sleeping, feeling anxious, confused, or depressed, seeing, hearing, or feeling things that are not there  · Unusual bleeding, bruising, or weakness  If you notice other side effects that you think are caused by this medicine, tell your doctor. Call your doctor for medical advice about side effects. You may report side effects to FDA at 4-397-AWW-7027  © 2017 [facility] Information is for End User's use only and may not be sold, redistributed or otherwise used for commercial purposes. The above information is an  only. It is not intended as medical advice for individual conditions or treatments. Talk to your doctor, nurse or pharmacist before following any medical regimen to see if it is safe and effective for you. Hydrocodone/Acetaminophen (By mouth)   Acetaminophen (h-cice-l-MIN-oh-fen), Hydrocodone Bitartrate (tir-jnjl-AHC-done bye-TAR-trate)  Treats pain. This medicine contains a narcotic pain reliever. Brand Name(s): Hycet, Lorcet, Lorcet HD, Lorcet Plus, Lortab 10/325, Lortab 5/325, Lortab 7.5/325, Lortab Elixir, Norco, Verdrocet, Vicodin, Vicodin ES, Vicodin HP, Xodol, Xodol 5/300   There may be other brand names for this medicine. When This Medicine Should Not Be Used: This medicine is not right for everyone. Do not use it if you had an allergic reaction to acetaminophen, hydrocodone, or other narcotic medicines, or stomach or bowel blockage (including paralytic ileus). How to Use This Medicine:   Capsule, Liquid, Tablet  · Your doctor will tell you how much medicine to use.  Do not use more than directed. · An overdose can be dangerous. Follow directions carefully so you do not get too much medicine at one time. · Oral liquid: Measure the oral liquid medicine with a marked measuring spoon, oral syringe, or medicine cup. · Drink plenty of liquids to help avoid constipation. · This medicine should come with a Medication Guide. Ask your pharmacist for a copy if you do not have one. · Missed dose: Take a dose as soon as you remember. If it is almost time for your next dose, wait until then and take a regular dose. Do not take extra medicine to make up for a missed dose. · Store the medicine in a closed container at room temperature, away from heat, moisture, and direct light. Flush any unused Norco® tablets down the toilet. Drugs and Foods to Avoid:   Ask your doctor or pharmacist before using any other medicine, including over-the-counter medicines, vitamins, and herbal products. · Do not use this medicine if you are using or have used an MAO inhibitor within the past 14 days. · Some medicines can affect how hydrocodone/acetaminophen works. Tell your doctor if you are using any of the following:   ¨ Carbamazepine, erythromycin, ketoconazole, mirtazapine, phenytoin, rifampin, ritonavir, tramadol, trazodone  ¨ Diuretic (water pill)  ¨ Medicine to treat depression or mental health problems  ¨ Medicine to treat migraine headaches  ¨ Phenothiazine medicine  · Tell your doctor if you use anything else that makes you sleepy. Some examples are allergy medicine, narcotic pain medicine, and alcohol. Tell your doctor if you are using buprenorphine, butorphanol, nalbuphine, pentazocine, or a muscle relaxer. · Do not drink alcohol while you are using this medicine. Acetaminophen can damage your liver, and your risk is higher if you also drink alcohol.   Warnings While Using This Medicine:   · Tell your doctor if you are pregnant or breastfeeding, or if you have kidney disease, liver disease, lung or breathing problems, gallbladder or pancreas problems, an underactive thyroid, Donnell disease, prostate problems, trouble urinating, stomach problems, or a history of head injury or brain tumor, seizures, alcohol or drug addiction. · This medicine may cause the following problems:   ¨ High risk of overdose, which can lead to death  ¨ Respiratory depression (serious breathing problem that can be life-threatening)  ¨ Liver problems  ¨ Serious skin reactions  ¨ Serotonin syndrome (when used with certain medicines)  · This medicine can be habit-forming. Do not use more than your prescribed dose. Call your doctor if you think your medicine is not working. · This medicine may make you dizzy or drowsy. Do not drive or doing anything else that could be dangerous until you know how this medicine affects you. · This medicine contains acetaminophen. Read the labels of all other medicines you are using to see if they also contain acetaminophen, or ask your doctor or pharmacist. Serene Rockwell not use more than 4 grams (4,000 milligrams) total of acetaminophen in one day. · Tell any doctor or dentist who treats you that you are using this medicine. This medicine may affect certain medical test results. · This medicine may cause constipation, especially with long-term use. Ask your doctor if you should use a laxative to prevent and treat constipation. · This medicine could cause infertility. Talk with your doctor before using this medicine if you plan to have children. · Keep all medicine out of the reach of children. Never share your medicine with anyone.   Possible Side Effects While Using This Medicine:   Call your doctor right away if you notice any of these side effects:  · Allergic reaction: Itching or hives, swelling in your face or hands, swelling or tingling in your mouth or throat, chest tightness, trouble breathing  · Anxiety, restlessness, fast heartbeat, fever, sweating, muscle spasms, twitching, diarrhea, seeing or hearing things that are not there  · Blistering, peeling, red skin rash  · Blue lips, fingernails, or skin  · Dark urine or pale stools, loss of appetite, nausea or vomiting, stomach pain, yellow skin or eyes  · Extreme weakness, shallow breathing, slow heartbeat, sweating, seizures, cold or clammy skin  · Lightheadedness, dizziness, fainting  If you notice these less serious side effects, talk with your doctor:   · Constipation, nausea, vomiting  · Tiredness or sleepiness  If you notice other side effects that you think are caused by this medicine, tell your doctor. Call your doctor for medical advice about side effects. You may report side effects to FDA at 8-394-FDA-6747  © 2017 [facility] Information is for End User's use only and may not be sold, redistributed or otherwise used for commercial purposes. The above information is an  only. It is not intended as medical advice for individual conditions or treatments. Talk to your doctor, nurse or pharmacist before following any medical regimen to see if it is safe and effective for you.

## 2017-06-14 NOTE — OP NOTES
[address]    Name:  Popeye Marie  MR#:  [id number]  :  1960  Account #:  [de-identified]  Date of Adm:  2017  Date of Surgery:  2017      PREOPERATIVE DIAGNOSIS: Gross hematuria and bladder filling  defect. DIAGNOSIS: Gross hematuria and bladder filling defect with evidence  of a 4 cm left side wall exophytic bladder tumor. PROCEDURES PERFORMED: Cystoscopy, transurethral resection of  bladder tumor. INDICATIONS: The patient is a 79-year-old [facility] American male  who presented with an episode of gross hematuria and a CT scan  showing a filling defect in the left side wall of the bladder. The patient  comes now for anesthesia, cystoscopy and probable transurethral  resection of bladder tumor. Cytology is negative. The patient is aware  of the preparation, technique and convalescence. The patient is aware  of the risks that include, but are not limited to infection, bleeding, injury,  failure, finding of cancer, possible need for additional procedures. He  wishes to proceed, giving his informed consent. ESTIMATED BLOOD LOSS: 5 mL. FINDINGS: Left side wall bladder tumor. ANESTHESIA: General.    COMPLICATIONS: A full-thickness bladder entry preventing post-  procedural mitomycin-C. SPECIMENS REMOVED: Bladder tumor. DESCRIPTION OF PROCEDURE: The patient is anesthetized via the  general LMA route. Time-out was accomplished and verified. The  patient was placed in the dorsal lithotomy position where he was  prepped and draped in sterile fashion. A rigid cystoscope is inserted  and retrograde evaluation carried out disclosing a pendulous bulbar  and prostatic urethra appropriate for age. The bladder is entered. The  trigone is normal and uninvolved. There is an exophytic bladder tumor  on the left side 4 cm in approximate size. There are no other tumors  present. The scope is removed after filling the bladder.  The urethra is  dilated to 28-Monegasque with Gilda Colvin sounds, following which a 32-  [place] continuous flow resectoscope is inserted through which is  placed a bipolar Tatum resecting element. Resection was begun in  routine fashion, resecting the superficial aspect down to the base of  the bladder. With the last resection pass, there is an obturator reflex  that causes some bleeding. This is controlled with cautery, but I am  seeing perivesical fat. This indicates a full-thickness entry and prevents  the instillation of mitomycin-C postprocedure. The specimen is  removed by simple irrigation without the use of an Ellik evacuator. At  the end of the procedure, the bladder fills and empties well. There is no  active bleeding. There are no remaining tumor fragments and the  remainder of the bladder epithelium is examined and I see no  abnormalities. The scope is removed and a Singh catheter is placed  and will remain in place for 6 days. The procedure is terminated. The  patient tolerated the procedure well.         MD OCTAVIO Maier / Edward Gray  D:  06/14/2017   08:19  T:  06/14/2017   08:42  Job #:  [id number]

## 2017-06-14 NOTE — PROGRESS NOTES
conducted a pre-surgery visit with Ene Sprague, who is a 56 y. o.,male. The  provided the following Interventions:  Initiated a relationship of care and support. Plan:  Chaplains will continue to follow and will provide pastoral care on an as needed/requested basis.  recommends bedside caregivers page  on duty if patient shows signs of acute spiritual or emotional distress.     [address] [facility]   Board Certified [address] [place] [address]   [phone]

## 2017-06-14 NOTE — ANESTHESIA POSTPROCEDURE EVALUATION
Post-Anesthesia Evaluation and Assessment    Patient: Sanjay Lakhani MRN: [id number]  SSN: [id number]    YOB: 1960  Age: 56 y.o. Sex: male       Cardiovascular Function/Vital Signs  Visit Vitals    /86    Pulse 69    Temp 36.3 °C (97.3 °F)    Resp 15    Ht 5' 6\" (1.676 m)    Wt 83.5 kg (184 lb)    SpO2 98%    BMI 29.7 kg/m2       Patient is status post general anesthesia for Procedure(s):  CYSTOSCOPY WITH BIOPSY OF BLADDER WITH DILATATION OF URETER. Nausea/Vomiting: None    Postoperative hydration reviewed and adequate. Pain:  Pain Scale 1: Numeric (0 - 10) (06/14/17 3886)  Pain Intensity 1: 0 (06/14/17 7800)   Managed    Neurological Status:   Neuro (WDL): Within Defined Limits (06/14/17 5811)   At baseline    Mental Status and Level of Consciousness: Arousable    Pulmonary Status:   O2 Device: Room air (06/14/17 8927)   Adequate oxygenation and airway patent    Complications related to anesthesia: None    Post-anesthesia assessment completed.  No concerns    Signed By: Meera Rai MD     June 14, 2017

## 2017-06-14 NOTE — ANESTHESIA PREPROCEDURE EVALUATION
Anesthetic History   No history of anesthetic complications            Review of Systems / Medical History  Patient summary reviewed, nursing notes reviewed and pertinent labs reviewed    Pulmonary  Within defined limits                 Neuro/Psych   Within defined limits           Cardiovascular    Hypertension: well controlled                   GI/Hepatic/Renal  Within defined limits              Endo/Other  Within defined limits           Other Findings   Comments:   Risk Factors for Postoperative nausea/vomiting:       History of postoperative nausea/vomiting? NO       Female? NO       Motion sickness? NO       Intended opioid administration for postoperative analgesia? YES      Smoking Abstinence  Current Smoker? NO  Elective Surgery? YES  Seen preoperatively by anesthesiologist or proxy prior to day of surgery? YES  Pt abstained from smoking 24 hours prior to anesthesia?  N/A           Physical Exam    Airway  Mallampati: II  TM Distance: 4 - 6 cm  Neck ROM: normal range of motion   Mouth opening: Normal     Cardiovascular  Regular rate and rhythm,  S1 and S2 normal,  no murmur, click, rub, or gallop             Dental    Dentition: Poor dentition     Pulmonary  Breath sounds clear to auscultation               Abdominal  GI exam deferred       Other Findings            Anesthetic Plan    ASA: 2  Anesthesia type: general          Induction: Intravenous  Anesthetic plan and risks discussed with: Patient

## 2017-06-14 NOTE — PERIOP NOTES
I have reviewed discharge and patino catheter care instructions and demonstrated how to empty patino catheter with the patient and spouse. The patient and spouse verbalized understanding. Patient armband removed and shredded.

## 2017-06-14 NOTE — BRIEF OP NOTE
BRIEF OPERATIVE NOTE    Date of Procedure: 6/14/2017   Preoperative Diagnosis: Gross hematuria [R31.0]  Postoperative Diagnosis: * No post-op diagnosis entered *    Procedure(s):  CYSTOSCOPY WITH BIOPSY/INSTILLATION OF MITOMYCIN  Surgeon(s) and Role:     * Irma Colmenares MD - Primary         Assistant Staff:       Surgical Staff:  Circ-1: Renata Navarro  Scrub Tech-1: Myrtie Heart  Event Time In   Incision Start 0700   Incision Close      Anesthesia: General   Estimated Blood Loss: 5cc  Specimens: * No specimens in log *   Findings: 4 cm left side wall exophytic tumor  Complications: obturator reflex with full thickness entry, prevents postop mitomycin  Implants: * No implants in log *

## 2017-06-14 NOTE — H&P
History and Physical   The patient is a pleasant 56-year-old -American male who was evaluated for episode of gross hematuria and a CT scan disclosed a filling defect in the left bladder wall. The patient comes now for preop assessment and is scheduled for cystoscopy with biopsy possible TURBT and possible mitomycin installation.          Past Medical History:   Diagnosis Date    Hypertension        There is no problem list on file for this patient.           Past Surgical History:   Procedure Laterality Date    HX CERVICAL FUSION   1996 & 2005    HX COLONOSCOPY                 Current Outpatient Prescriptions   Medication Sig Dispense Refill    losartan (COZAAR) 50 mg tablet 50 mg daily.   1    gabapentin (NEURONTIN) 800 mg tablet 800 mg two (2) times a day.   3    naproxen (NAPROSYN) 500 mg tablet 500 mg two (2) times daily (with meals).    0      No Known Allergies  Social History            Social History    Marital status:        Spouse name: N/A    Number of children: N/A    Years of education: N/A          Occupational History    Not on file.            Social History Main Topics    Smoking status: Current Every Day Smoker       Packs/day: 0.25    Smokeless tobacco: Never Used    Alcohol use No    Drug use: No    Sexual activity: Yes           Other Topics Concern    Not on file      Social History Narrative            Family History   Problem Relation Age of Onset    Cancer Mother      Cancer Father      Cancer Brother      Diabetes Brother              ROS  General-no chills, fatigue, weight loss, fever  Psych no anxiety, depression, irritability, mood swings  HEENT-no headache, blurred vision, dizziness, hearing loss, congestion, sore throat  Lungs-no shortness of breath, hemoptysis, cough, pain  Cardiac-No irregular rhythms, no chest pain, syncope, dyspnea, nocturnal shortness of breath, edema  GI- no nausea, vomiting, diarrhea, constipation, cramping  - no dysuria, hematuria, stone passage, incontinence, altered stream  Ortho- muscle pain, cramping, joint pain, back pain, swelling  Skin- no rashes, dryness, here change, skin lesion changes  Neuro- no confusion, headache, seizure, weakness           Visit Vitals    BP (!) 145/93 (BP 1 Location: Left arm, BP Patient Position: Sitting)    Pulse (!) 52    Temp 98 °F (36.7 °C) (Oral)    Ht 5' 6\" (1.676 m)    Wt 186 lb (84.4 kg)    SpO2 98%    BMI 30.02 kg/m2      Physical          Gen: WDWN adult NAD  Head  : normocephalic,  Normal ROM; eyes without normal pupils, EOMs, no masses;  conjunctiva normal  Neck: normal movement,  no evident mass,  No evident adenopathy, trachea midline,  Lungs clear to auscultation with no rales or ronchi or rubs  Cardiac NSR with no murmur, rub, extra sounds. There is no rhythm abnormality  Abd :bowel sounds normal, no masses, tenderness, organomegaly  Flanks     -     Extremities- no edema, arthritis, deformity, swelling  Psych- oriented, no evident anxiety, no cognitive impairment evident                          Impression/ PLAN  Hematuria with bladder filling defect on imaging studies     Pain:  Patient is fully counseled regarding anesthesia cystoscopy with biopsy and transurethral resection of bladder tumor and instillation of mitomycin. The patient is aware of the preparation, technique, convalescence.  He is aware of the risks that include, but are not limited to, infection, bleeding, failure to diagnose, injury, finding of cancer, and possible need for further procedures and surveillance and possible medication

## 2017-06-15 ENCOUNTER — HOSPITAL ENCOUNTER (EMERGENCY)
Age: 57
Discharge: HOME | End: 2017-06-15
Attending: EMERGENCY MEDICINE | Admitting: EMERGENCY MEDICINE
Payer: MEDICARE

## 2017-06-15 VITALS
OXYGEN SATURATION: 100 % | TEMPERATURE: 98 F | SYSTOLIC BLOOD PRESSURE: 163 MMHG | RESPIRATION RATE: 18 BRPM | HEART RATE: 63 BPM | DIASTOLIC BLOOD PRESSURE: 97 MMHG

## 2017-06-15 DIAGNOSIS — T83.9XXA FOLEY CATHETER PROBLEM, INITIAL ENCOUNTER (HCC): Primary | ICD-10-CM

## 2017-06-15 PROCEDURE — 77030029179 HC BAG URIN DRNG SIMS -A

## 2017-06-15 PROCEDURE — 99282 EMERGENCY DEPT VISIT SF MDM: CPT

## 2017-06-15 NOTE — ED NOTES
I have reviewed discharge instructions with the patient. The patient verbalized understanding.  Pt ambulated out of Ed in stable condition with no complaints voiced and no distress noted

## 2017-06-15 NOTE — ED TRIAGE NOTES
Patient stated he had a urinary catheter placed yesterday, and today it was not draining and is causing his pain rated 5/10.
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
## 2017-06-16 ENCOUNTER — TELEPHONE (OUTPATIENT)
Dept: UROLOGY | Age: 57
End: 2017-06-16

## 2017-06-16 ENCOUNTER — DOCUMENTATION ONLY (OUTPATIENT)
Dept: UROLOGY | Age: 57
End: 2017-06-16

## 2017-06-16 NOTE — TELEPHONE ENCOUNTER
The patient called the office stating that he had had some difficulty with the bag not draining properly and went to the emergency room and they gave him a different catheter drainage bag. He is concerned because there is little bit of blood and purulent material coming out around the catheter. He also is asking why he needs to have the catheter left in for a total of 6 days. I have advised him that he does have a cancer but I have not gone into detail about the grade and stage. I have advised him emphatically that we need to leave the catheter in because of the full-thickness breech that was made in removal of this cancer.   The patient will be returning to see me on June 20

## 2017-06-16 NOTE — PROGRESS NOTES
Patient called the office stating he had surgery 6/14/17 and a catheter was placed, and yesterday the catheter stopped draining and he went to the ER and they changed it out. He states that now he is having burning from the catheter and has noticed some blood and pus in his underwear. He states the catheter is draining. He denies any fever. He wants to know why does he have to keep the catheter until Tuesday. [phone].

## 2017-06-20 ENCOUNTER — OFFICE VISIT (OUTPATIENT)
Dept: UROLOGY | Age: 57
End: 2017-06-20

## 2017-06-20 VITALS
HEIGHT: 66 IN | SYSTOLIC BLOOD PRESSURE: 157 MMHG | OXYGEN SATURATION: 99 % | BODY MASS INDEX: 29.57 KG/M2 | HEART RATE: 57 BPM | DIASTOLIC BLOOD PRESSURE: 94 MMHG | WEIGHT: 184 LBS

## 2017-06-20 DIAGNOSIS — C67.5 CANCER, BLADDER, NECK (HCC): ICD-10-CM

## 2017-06-20 DIAGNOSIS — R31.0 GROSS HEMATURIA: Primary | ICD-10-CM

## 2017-06-20 LAB
BILIRUB UR QL STRIP: NEGATIVE
GLUCOSE UR-MCNC: NEGATIVE MG/DL
KETONES P FAST UR STRIP-MCNC: NEGATIVE MG/DL
PH UR STRIP: 7 [PH] (ref 4.6–8)
PROT UR QL STRIP: NORMAL MG/DL
SP GR UR STRIP: 1.01 (ref 1–1.03)
UA UROBILINOGEN AMB POC: NORMAL (ref 0.2–1)
URINALYSIS CLARITY POC: NORMAL
URINALYSIS COLOR POC: YELLOW
URINE BLOOD POC: NORMAL
URINE LEUKOCYTES POC: NORMAL
URINE NITRITES POC: NEGATIVE

## 2017-06-20 NOTE — PATIENT INSTRUCTIONS

## 2017-06-20 NOTE — PROGRESS NOTES
Mr. Ghazala Workman has a reminder for a \"due or due soon\" health maintenance. I have asked that he contact his primary care provider for follow-up on this health maintenance. RBV per Dr Bailey Rodriguez deflated  balloon. Removed patino catheter with no complications. Patient tolereted well. Patient instructed to call office if unable to void within 6 hours.

## 2017-06-20 NOTE — PROGRESS NOTES
The patient returns for Singh discontinuation. The patient is advised what to expect and also regarding fluid consumption and exertion. 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 I have discussed the need for surveillance cystoscopy and emphasized that this must be undertaken if we are to prevent development of further or more invasive tumors. I have also discussed with him the nature of immunotherapy and BCG instillation according to protocol. The patient will return in 3 weeks to begin this treatment. The risks have been discussed in detail    This visit exceeded 25 minutes and greater than 50% was counseling. The patient expresses understanding of the treatment plan and wishes to proceed    This dictation used voice recognition software and there may be mistakes.     Hannah Wolf MD

## 2017-06-20 NOTE — MR AVS SNAPSHOT
Visit Information Date & Time Provider Department Dept. Phone Encounter #  
 6/20/2017 10:15 AM Doreen Stone, [address] [facility]  Your Appointments 7/11/2017 10:30 AM  
PROCEDURE with Doreen Stone MD  
[facility] [address]) Appt Note: BCG #1  
 [address] [place] [address] [address]  
[phone] [address] [phone]  
  
    
 7/18/2017 10:00 AM  
PROCEDURE with Joy Sprague MD  
[facility] [address] [place] [address] Road) Appt Note: BCG #2/Dr Dooley's patient [address] [place] [address] [address]  
[phone] [address] [phone]  
  
    
 7/25/2017 10:15 AM  
PROCEDURE with Justin Booker MD  
[facility] [address]) Appt Note: BCG #3  
 [address] [place] [address] [address]  
[phone] [address] [phone]  
  
    
 8/1/2017 10:15 AM  
PROCEDURE with Justin Booker MD  
[facility] [address]) Appt Note: BCG #4  
 [address] [place] [address] [address]  
[phone] Upcoming Health Maintenance Date Due Hepatitis C Screening 1960 Pneumococcal 19-64 Medium Risk (1 of 1 - PPSV23) 5/4/1981 DTaP/Tdap/Td series (1 - Tdap) 5/4/1983 FOBT Q 1 YEAR AGE 50-75 5/4/2012 INFLUENZA AGE 9 TO ADULT 8/1/2017 Allergies as of 6/20/2017  Review Complete On: 6/20/2017 By: Severiano Mariscal, LPN No Known Allergies Current Immunizations  Never Reviewed No immunizations on file. Not reviewed this visit You Were Diagnosed With   
  
 Codes Comments Gross hematuria    -  Primary ICD-10-CM: R31.0 ICD-9-CM: 599.71 Vitals BP Pulse Height(growth percentile) Weight(growth percentile) SpO2 BMI (!) 157/94 (BP 1 Location: Right arm, BP Patient Position: Sitting) (!) 57 5' 6\" (1.676 m) 184 lb (83.5 kg) 99% 29.7 kg/m2 Smoking Status Current Every Day Smoker Vitals History BMI and BSA Data Body Mass Index Body Surface Area  
 29.7 kg/m 2 1.97 m 2 Preferred Pharmacy Pharmacy Name Phone [address], [facility] [phone] Your Updated Medication List  
  
   
This list is accurate as of: 6/20/17 10:39 AM.  Always use your most recent med list.  
  
  
  
  
 ciprofloxacin HCl 500 mg tablet Commonly known as:  CIPRO Take 1 Tab by mouth two (2) times a day for 7 days. docusate sodium 100 mg capsule Commonly known as:  Cindra Jacks Take 1 Cap by mouth two (2) times a day for 90 days. gabapentin 800 mg tablet Commonly known as:  NEURONTIN Take 800 mg by mouth three (3) times daily. HYDROcodone-acetaminophen 5-325 mg per tablet Commonly known as:  NORCO  
1 tab every 6 hours when necessary pain  
  
 ibuprofen 600 mg tablet Commonly known as:  MOTRIN Take 1 Tab by mouth every six (6) hours as needed for Pain.  
  
 losartan 50 mg tablet Commonly known as:  COZAAR  
50 mg daily. methocarbamol 500 mg tablet Commonly known as:  ROBAXIN Take 2 Tabs by mouth four (4) times daily as needed. naproxen 500 mg tablet Commonly known as:  NAPROSYN  
1 tab po bid po prn pain We Performed the Following AMB POC URINALYSIS DIP STICK AUTO W/O MICRO [30418 CPT(R)] Patient Instructions Blood in the Urine: Care Instructions Your Care Instructions Blood in the urine, or hematuria, may make the urine look red, brown, or pink. There may be blood every time you urinate or just from time to time. You cannot always see blood in the urine, but it will show up in a urine test. 
Blood in the urine may be serious.  It should always be checked by a doctor. Your doctor may recommend more tests, including an X-ray, a CT scan, or a cystoscopy (which lets a doctor look inside the urethra and bladder). Blood in the urine can be a sign of another problem. Common causes are bladder infections and kidney stones. An injury to your groin or your genital area can also cause bleeding in the urinary tract. Very hard exercisesuch as running a marathoncan cause blood in the urine. Blood in the urine can also be a sign of kidney disease or cancer in the bladder or kidney. Many cases of blood in the urine are caused by a harmless condition that runs in families. This is called benign familial hematuria. It does not need any treatment. Sometimes your urine may look red or brown even though it does not contain blood. For example, not getting enough fluids (dehydration), taking certain medicines, or having a liver problem can change the color of your urine. Eating foods such as beets, rhubarb, or blackberries or foods with red food coloring can make your urine look red or pink. Follow-up care is a key part of your treatment and safety. Be sure to make and go to all appointments, and call your doctor if you are having problems. It's also a good idea to know your test results and keep a list of the medicines you take. When should you call for help? Call your doctor now or seek immediate medical care if: 
· You have symptoms of a urinary infection. For example: ¨ You have pus in your urine. ¨ You have pain in your back just below your rib cage. This is called flank pain. ¨ You have a fever, chills, or body aches. ¨ It hurts to urinate. ¨ You have groin or belly pain. · You have more blood in your urine. Watch closely for changes in your health, and be sure to contact your doctor if: 
· You have new urination problems. · You do not get better as expected. Where can you learn more? Go to http://tramaine-alis.info/. Enter A894 in the search box to learn more about \"Blood in the Urine: Care Instructions. \" Current as of: March 20, 2017 Content Version: 11.3 © 2551-3907 [employer]. Care instructions adapted under license by [employer] (which disclaims liability or warranty for this information). If you have questions about a medical condition or this instruction, always ask your healthcare professional. [place] [address] any warranty or liability for your use of this information. Introducing [facility]! [employer] introduces 2-Observe patient portal. Now you can access parts of your medical record, email your doctor's office, and request medication refills online. 1. In your internet browser, go to [email or web address] 2. Click on the First Time User? Click Here link in the Sign In box. You will see the New Member Sign Up page. 3. Enter your 2-Observe Access Code exactly as it appears below. You will not need to use this code after youve completed the sign-up process. If you do not sign up before the expiration date, you must request a new code. · 2-Observe Access Code: [email or web address] Expires: 7/3/2017 12:59 PM 
 
4. Enter the last four digits of your Social Security Number (xxxx) and Date of Birth (mm/dd/yyyy) as indicated and click Submit. You will be taken to the next sign-up page. 5. Create a 2-Observe ID. This will be your 2-Observe login ID and cannot be changed, so think of one that is secure and easy to remember. 6. Create a 2-Observe password. You can change your password at any time. 7. Enter your Password Reset Question and Answer. This can be used at a later time if you forget your password. 8. Enter your e-mail address. You will receive e-mail notification when new information is available in [address] 9. Click Sign Up. You can now view and download portions of your medical record. 10. Click the Download Summary menu link to download a portable copy of your medical information. If you have questions, please visit the Frequently Asked Questions section of the [employer] website. Remember, [employer] is NOT to be used for urgent needs. For medical emergencies, dial 911. Now available from your iPhone and Android! Please provide this summary of care documentation to your next provider. Your primary care clinician is listed as Mia Pacheco. If you have any questions after today's visit, please call [phone].

## 2017-06-26 ENCOUNTER — HOSPITAL ENCOUNTER (EMERGENCY)
Age: 57
Discharge: HOME | End: 2017-06-26
Attending: EMERGENCY MEDICINE | Admitting: EMERGENCY MEDICINE
Payer: MEDICARE

## 2017-06-26 VITALS
WEIGHT: 184 LBS | HEIGHT: 66 IN | HEART RATE: 52 BPM | RESPIRATION RATE: 16 BRPM | SYSTOLIC BLOOD PRESSURE: 157 MMHG | OXYGEN SATURATION: 99 % | BODY MASS INDEX: 29.57 KG/M2 | DIASTOLIC BLOOD PRESSURE: 90 MMHG | TEMPERATURE: 97.7 F

## 2017-06-26 DIAGNOSIS — R31.9 HEMATURIA: Primary | ICD-10-CM

## 2017-06-26 DIAGNOSIS — R03.0 ELEVATED BLOOD PRESSURE READING: ICD-10-CM

## 2017-06-26 LAB
APPEARANCE UR: ABNORMAL
BACTERIA URNS QL MICRO: ABNORMAL /HPF
BILIRUB UR QL: NEGATIVE
COLOR UR: ABNORMAL
EPITH CASTS URNS QL MICRO: ABNORMAL /LPF (ref 0–5)
GLUCOSE UR STRIP.AUTO-MCNC: NEGATIVE MG/DL
HGB UR QL STRIP: ABNORMAL
KETONES UR QL STRIP.AUTO: NEGATIVE MG/DL
LEUKOCYTE ESTERASE UR QL STRIP.AUTO: NEGATIVE
NITRITE UR QL STRIP.AUTO: NEGATIVE
PH UR STRIP: 6 [PH] (ref 5–8)
PROT UR STRIP-MCNC: 100 MG/DL
RBC #/AREA URNS HPF: ABNORMAL /HPF (ref 0–5)
SP GR UR REFRACTOMETRY: 1.01 (ref 1–1.03)
UROBILINOGEN UR QL STRIP.AUTO: 0.2 EU/DL (ref 0.2–1)
WBC URNS QL MICRO: ABNORMAL /HPF (ref 0–4)

## 2017-06-26 PROCEDURE — 99283 EMERGENCY DEPT VISIT LOW MDM: CPT

## 2017-06-26 PROCEDURE — 81001 URINALYSIS AUTO W/SCOPE: CPT | Performed by: EMERGENCY MEDICINE
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
## 2017-06-26 NOTE — DISCHARGE INSTRUCTIONS
Blood in the Urine: Care Instructions  Your Care Instructions  Blood in the urine, or hematuria, may make the urine look red, brown, or pink. There may be blood every time you urinate or just from time to time. You cannot always see blood in the urine, but it will show up in a urine test.  Blood in the urine may be serious. It should always be checked by a doctor. Your doctor may recommend more tests, including an X-ray, a CT scan, or a cystoscopy (which lets a doctor look inside the urethra and bladder). Blood in the urine can be a sign of another problem. Common causes are bladder infections and kidney stones. An injury to your groin or your genital area can also cause bleeding in the urinary tract. Very hard exercise--such as running a marathon--can cause blood in the urine. Blood in the urine can also be a sign of kidney disease or cancer in the bladder or kidney. Many cases of blood in the urine are caused by a harmless condition that runs in families. This is called benign familial hematuria. It does not need any treatment. Sometimes your urine may look red or brown even though it does not contain blood. For example, not getting enough fluids (dehydration), taking certain medicines, or having a liver problem can change the color of your urine. Eating foods such as beets, rhubarb, or blackberries or foods with red food coloring can make your urine look red or pink. Follow-up care is a key part of your treatment and safety. Be sure to make and go to all appointments, and call your doctor if you are having problems. It's also a good idea to know your test results and keep a list of the medicines you take. When should you call for help? Call your doctor now or seek immediate medical care if:  · You have symptoms of a urinary infection. For example:  ¨ You have pus in your urine. ¨ You have pain in your back just below your rib cage. This is called flank pain.   ¨ You have a fever, chills, or body aches.  ¨ It hurts to urinate. ¨ You have groin or belly pain. · You have more blood in your urine. Watch closely for changes in your health, and be sure to contact your doctor if:  · You have new urination problems. · You do not get better as expected. Where can you learn more? Go to http://tramaine-alis.info/. Enter S559 in the search box to learn more about \"Blood in the Urine: Care Instructions. \"  Current as of: March 20, 2017  Content Version: 11.3  © 5042-9063 [employer]. Care instructions adapted under license by [employer] (which disclaims liability or warranty for this information). If you have questions about a medical condition or this instruction, always ask your healthcare professional. [place] [address] any warranty or liability for your use of this information. Elevated Blood Pressure: Care Instructions  Your Care Instructions    Blood pressure is a measure of how hard the blood pushes against the walls of your arteries. It's normal for blood pressure to go up and down throughout the day. But if it stays up over time, you have high blood pressure. Two numbers tell you your blood pressure. The first number is the systolic pressure. It shows how hard the blood pushes when your heart is pumping. The second number is the diastolic pressure. It shows how hard the blood pushes between heartbeats, when your heart is relaxed and filling with blood. An ideal blood pressure in adults is less than 120/80 (say \"120 over 80\"). High blood pressure is 140/90 or higher. You have high blood pressure if your top number is 140 or higher or your bottom number is 90 or higher, or both. The main test for high blood pressure is simple, fast, and painless. To diagnose high blood pressure, your doctor will test your blood pressure at different times. After testing your blood pressure, your doctor may ask you to test it again when you are home.   If you are diagnosed with high blood pressure, you can work with your doctor to make a long-term plan to manage it. Follow-up care is a key part of your treatment and safety. Be sure to make and go to all appointments, and call your doctor if you are having problems. It's also a good idea to know your test results and keep a list of the medicines you take. How can you care for yourself at home? · Do not smoke. Smoking increases your risk for heart attack and stroke. If you need help quitting, talk to your doctor about stop-smoking programs and medicines. These can increase your chances of quitting for good. · Stay at a healthy weight. · Try to limit how much sodium you eat to less than 2,300 milligrams (mg) a day. Your doctor may ask you to try to eat less than 1,500 mg a day. · Be physically active. Get at least 30 minutes of exercise on most days of the week. Walking is a good choice. You also may want to do other activities, such as running, swimming, cycling, or playing tennis or team sports. · Avoid or limit alcohol. Talk to your doctor about whether you can drink any alcohol. · Eat plenty of fruits, vegetables, and low-fat dairy products. Eat less saturated and total fats. · Learn how to check your blood pressure at home. When should you call for help? Call your doctor now or seek immediate medical care if:  · Your blood pressure is much higher than normal (such as 180/110 or higher). · You think high blood pressure is causing symptoms such as:  ¨ Severe headache. ¨ Blurry vision. Watch closely for changes in your health, and be sure to contact your doctor if:  · You do not get better as expected. Where can you learn more? Go to http://tramaine-alis.info/. Enter O199 in the search box to learn more about \"Elevated Blood Pressure: Care Instructions. \"  Current as of: April 3, 2017  Content Version: 11.3  © 6744-2019 [employer].  Care instructions adapted under license by [address] (which disclaims liability or warranty for this information). If you have questions about a medical condition or this instruction, always ask your healthcare professional. [address] any warranty or liability for your use of this information.

## 2017-06-29 ENCOUNTER — OFFICE VISIT (OUTPATIENT)
Dept: UROLOGY | Age: 57
End: 2017-06-29

## 2017-06-29 VITALS
DIASTOLIC BLOOD PRESSURE: 86 MMHG | HEIGHT: 66 IN | BODY MASS INDEX: 29.73 KG/M2 | WEIGHT: 185 LBS | TEMPERATURE: 97.4 F | OXYGEN SATURATION: 99 % | HEART RATE: 57 BPM | SYSTOLIC BLOOD PRESSURE: 131 MMHG

## 2017-06-29 DIAGNOSIS — C67.2 MALIGNANT NEOPLASM OF LATERAL WALL OF URINARY BLADDER (HCC): Primary | ICD-10-CM

## 2017-06-29 DIAGNOSIS — R31.0 GROSS HEMATURIA: ICD-10-CM

## 2017-06-29 LAB
BILIRUB UR QL STRIP: NEGATIVE
GLUCOSE UR-MCNC: NEGATIVE MG/DL
KETONES P FAST UR STRIP-MCNC: NEGATIVE MG/DL
PH UR STRIP: 6 [PH] (ref 4.6–8)
PROT UR QL STRIP: NORMAL MG/DL
SP GR UR STRIP: 1.02 (ref 1–1.03)
UA UROBILINOGEN AMB POC: NORMAL (ref 0.2–1)
URINALYSIS CLARITY POC: CLEAR
URINALYSIS COLOR POC: YELLOW
URINE BLOOD POC: NORMAL
URINE LEUKOCYTES POC: NORMAL
URINE NITRITES POC: NEGATIVE

## 2017-06-29 RX ORDER — IBUPROFEN 200 MG
TABLET ORAL
Refills: 1 | COMMUNITY
Start: 2017-06-22

## 2017-06-29 NOTE — PATIENT INSTRUCTIONS

## 2017-06-29 NOTE — MR AVS SNAPSHOT
Visit Information Date & Time Provider Department Dept. Phone Encounter #  
 6/29/2017  9:15 AM Shaniqua Younger [facility] [phone] [id number] Your Appointments 7/11/2017 10:30 AM  
PROCEDURE with Earnestine Pallas, MD  
[facility]) Appt Note: BCG #1  
 [address] [place] [address] [address]  
[phone] [address] [id number]  
  
    
 7/18/2017 10:00 AM  
PROCEDURE with Robert Webber MD  
[facility]) Appt Note: BCG #2/Dr Dooley's patient [address] [place] [address] [address]  
[phone] [address] [address]  
  
    
 7/25/2017 10:15 AM  
PROCEDURE with Jaylyn Bourgeois MD  
[facility]) Appt Note: BCG #3  
 [address] [place] [address] [address]  
[phone] [address] [address]  
  
    
 8/1/2017 10:15 AM  
PROCEDURE with Jaylyn Bourgeois MD  
[facility]) Appt Note: BCG #4  
 [address] [place] [address]  
[phone]  
  
    
 8/8/2017 10:00 AM  
PROCEDURE with Earnestine Pallas, MD  
[facility]) Appt Note: BCG #5  
 [address] [place] [address] [address]  
[phone] 8/15/2017 10:30 AM  
PROCEDURE with Earnestine Pallas, MD  
[facility]) Appt Note: BCG #6  
 [address] [place] [address] [address]  
[phone] Upcoming Health Maintenance Date Due Hepatitis C Screening 1960 Pneumococcal 19-64 Highest Risk (1 of 3 - PCV13) 5/4/1981 DTaP/Tdap/Td series (1 - Tdap) 5/4/1983 FOBT Q 1 YEAR AGE 50-75 5/4/2012 INFLUENZA AGE 9 TO ADULT 8/1/2017 Allergies as of 6/29/2017  Review Complete On: 6/29/2017 By: Ana Rosenthal MD  
 No Known Allergies Current Immunizations  Never Reviewed No immunizations on file. Not reviewed this visit You Were Diagnosed With   
  
 Codes Comments Malignant neoplasm of lateral wall of urinary bladder (HCC)    -  Primary ICD-10-CM: V55.0 ICD-9-CM: 188.2 Gross hematuria     ICD-10-CM: R31.0 ICD-9-CM: 599.71 Vitals BP Pulse Temp Height(growth percentile) Weight(growth percentile) SpO2  
 131/86 (BP 1 Location: Left arm, BP Patient Position: Sitting) (!) 57 97.4 °F (36.3 °C) 5' 6\" (1.676 m) 185 lb (83.9 kg) 99% BMI Smoking Status 29.86 kg/m2 Current Every Day Smoker Vitals History BMI and BSA Data Body Mass Index Body Surface Area  
 29.86 kg/m 2 1.98 m 2 Preferred Pharmacy Pharmacy Name Phone [address] [place] [address], [address] [place] [address] [phone] Your Updated Medication List  
  
   
This list is accurate as of: 6/29/17  1:53 PM.  Always use your most recent med list.  
  
  
  
  
 docusate sodium 100 mg capsule Commonly known as:  Cynthiana Angle Take 1 Cap by mouth two (2) times a day for 90 days. gabapentin 800 mg tablet Commonly known as:  NEURONTIN Take 800 mg by mouth three (3) times daily. HYDROcodone-acetaminophen 5-325 mg per tablet Commonly known as:  NORCO  
1 tab every 6 hours when necessary pain  
  
 ibuprofen 600 mg tablet Commonly known as:  MOTRIN Take 1 Tab by mouth every six (6) hours as needed for Pain.  
  
 losartan 50 mg tablet Commonly known as:  COZAAR  
50 mg daily. methocarbamol 500 mg tablet Commonly known as:  ROBAXIN Take 2 Tabs by mouth four (4) times daily as needed. naproxen 500 mg tablet Commonly known as:  NAPROSYN  
1 tab po bid po prn pain  
  
 nicotine 14 mg/24 hr patch Commonly known as:  Olene Greaser  
 APPLY 1 PATCH TOPICALLY EVERY 24 HOURS FOR 6 WEEKS We Performed the Following AMB POC URINALYSIS DIP STICK AUTO W/O MICRO [32970 CPT(R)] Patient Instructions Blood in the Urine: Care Instructions Your Care Instructions Blood in the urine, or hematuria, may make the urine look red, brown, or pink. There may be blood every time you urinate or just from time to time. You cannot always see blood in the urine, but it will show up in a urine test. 
Blood in the urine may be serious. It should always be checked by a doctor. Your doctor may recommend more tests, including an X-ray, a CT scan, or a cystoscopy (which lets a doctor look inside the urethra and bladder). Blood in the urine can be a sign of another problem. Common causes are bladder infections and kidney stones. An injury to your groin or your genital area can also cause bleeding in the urinary tract. Very hard exercisesuch as running a marathoncan cause blood in the urine. Blood in the urine can also be a sign of kidney disease or cancer in the bladder or kidney. Many cases of blood in the urine are caused by a harmless condition that runs in families. This is called benign familial hematuria. It does not need any treatment. Sometimes your urine may look red or brown even though it does not contain blood. For example, not getting enough fluids (dehydration), taking certain medicines, or having a liver problem can change the color of your urine. Eating foods such as beets, rhubarb, or blackberries or foods with red food coloring can make your urine look red or pink. Follow-up care is a key part of your treatment and safety. Be sure to make and go to all appointments, and call your doctor if you are having problems. It's also a good idea to know your test results and keep a list of the medicines you take. When should you call for help? Call your doctor now or seek immediate medical care if: · You have symptoms of a urinary infection. For example: ¨ You have pus in your urine. ¨ You have pain in your back just below your rib cage. This is called flank pain. ¨ You have a fever, chills, or body aches. ¨ It hurts to urinate. ¨ You have groin or belly pain. · You have more blood in your urine. Watch closely for changes in your health, and be sure to contact your doctor if: 
· You have new urination problems. · You do not get better as expected. Where can you learn more? Go to http://tramaine-alis.info/. Enter D638 in the search box to learn more about \"Blood in the Urine: Care Instructions. \" Current as of: March 20, 2017 Content Version: 11.3 © 0491-3159 [employer]. Care instructions adapted under license by [employer] (which disclaims liability or warranty for this information). If you have questions about a medical condition or this instruction, always ask your healthcare professional. [place] [address] any warranty or liability for your use of this information. Patient Instructions History Introducing \[facility]\"" & HEALTH SERVICES! [employer] introduces Verified Person patient portal. Now you can access parts of your medical record, email your doctor's office, and request medication refills online. 1. In your internet browser, go to [email or web address] 2. Click on the First Time User? Click Here link in the Sign In box. You will see the New Member Sign Up page. 3. Enter your Verified Person Access Code exactly as it appears below. You will not need to use this code after youve completed the sign-up process. If you do not sign up before the expiration date, you must request a new code. · [employer] Access Code: [email or web address] Expires: 7/3/2017 12:59 PM 
 
4. Enter the last four digits of your Social Security Number (xxxx) and Date of Birth (mm/dd/yyyy) as indicated and click Submit.  You will be taken to the next sign-up page. 5. Create a [employer] ID. This will be your [employer] login ID and cannot be changed, so think of one that is secure and easy to remember. 6. Create a [employer] password. You can change your password at any time. 7. Enter your Password Reset Question and Answer. This can be used at a later time if you forget your password. 8. Enter your e-mail address. You will receive e-mail notification when new information is available in [address]. 9. Click Sign Up. You can now view and download portions of your medical record. 10. Click the Download Summary menu link to download a portable copy of your medical information. If you have questions, please visit the Frequently Asked Questions section of the [employer] website. Remember, [employer] is NOT to be used for urgent needs. For medical emergencies, dial 911. Now available from your iPhone and Android! Please provide this summary of care documentation to your next provider. Your primary care clinician is listed as Charo Valdez. If you have any questions after today's visit, please call [phone].
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
## 2017-06-29 NOTE — PROGRESS NOTES
Mr. Valerie Kuo has a reminder for a \"due or due soon\" health maintenance. I have asked that he contact his primary care provider for follow-up on this health maintenance.

## 2017-07-11 ENCOUNTER — OFFICE VISIT (OUTPATIENT)
Dept: UROLOGY | Age: 57
End: 2017-07-11

## 2017-07-11 VITALS
OXYGEN SATURATION: 99 % | HEART RATE: 54 BPM | DIASTOLIC BLOOD PRESSURE: 100 MMHG | BODY MASS INDEX: 29.89 KG/M2 | HEIGHT: 66 IN | TEMPERATURE: 97.5 F | WEIGHT: 186 LBS | SYSTOLIC BLOOD PRESSURE: 140 MMHG

## 2017-07-11 DIAGNOSIS — C67.9 MALIGNANT NEOPLASM OF URINARY BLADDER, UNSPECIFIED SITE (HCC): Primary | ICD-10-CM

## 2017-07-11 LAB
BILIRUB UR QL STRIP: NEGATIVE
GLUCOSE UR-MCNC: NEGATIVE MG/DL
KETONES P FAST UR STRIP-MCNC: NEGATIVE MG/DL
PH UR STRIP: 6 [PH] (ref 4.6–8)
PROT UR QL STRIP: NEGATIVE MG/DL
SP GR UR STRIP: 1.02 (ref 1–1.03)
UA UROBILINOGEN AMB POC: NORMAL (ref 0.2–1)
URINALYSIS CLARITY POC: CLEAR
URINALYSIS COLOR POC: YELLOW
URINE BLOOD POC: NEGATIVE
URINE LEUKOCYTES POC: NEGATIVE
URINE NITRITES POC: NEGATIVE

## 2017-07-11 NOTE — PROGRESS NOTES
The patient is a 56-year-old -American male who is postop transurethral resection of a bladder tumor which was noninvasive but showed high-grade characteristics. Because we had a through and through penetration of the bladder wall, Idamycin was not administered. The patient comes now to begin his BCG regimen. We have again spoken extensively about the way in which BCG works, the protocol for administration, and the risks that include, but are not limited to, dysuria, infection, a viral-like syndrome, the BCG tuberculosis type syndrome and the possibility of failure to cure. The patient is aware also of the need for interval surveillance cystoscopies. Refer to the procedure note. The patient will return in 1 week    This visit exceeded 15 minutes, independent of the BCG treatment and this involved a repeat of education and instructions regarding BCG treatment and greater than 50% was counseling. The patient expresses understanding of the treatment plan and wishes to proceed    This dictation used voice recognition software and there may be mistakes.     Aram Lowery MD

## 2017-07-11 NOTE — PROGRESS NOTES
Mr. Armaan Fajardo has a reminder for a \"due or due soon\" health maintenance. I have asked that he contact his primary care provider for follow-up on this health maintenance.     [facility]  OFFICE PROCEDURE PROGRESS NOTE        Chart reviewed for the following:   IElodia LPN, have reviewed the History, Physical and updated the Allergic reactions for [place] performed immediately prior to start of procedure:   Daysi Tobar LPN, have performed the following reviews on Edith Bergeron prior to the start of the procedure:            * Patient was identified by name and date of birth   * Agreement on procedure being performed was verified  * Risks and Benefits explained to the patient  * Procedure site verified and marked as necessary  * Patient was positioned for comfort  * Consent was signed and verified     Time: 10:39AM      Date of procedure: 7/11/2017    Procedure performed by:  Raymond Razo MD    Provider assisted by: Pavithra Davis LPN    Patient assisted by: self    How tolerated by patient: tolerated the procedure well with no complications    Post Procedural Pain Scale: 0 - No Hurt    Comments: none

## 2017-07-11 NOTE — PROGRESS NOTES
Mr. Brittney Garcia has a reminder for a \"due or due soon\" health maintenance. I have asked that he contact his primary care provider for follow-up on this health maintenance.

## 2017-07-11 NOTE — PATIENT INSTRUCTIONS
Bacillus of [address] (Inside the bladder)   Bacillus of [place]met[place] and Monica (ba-SILL-us of Vasu-met and SHELL-in)  Treats or prevents bladder cancer, and used to prevent certain bladder tumors. Also called Bacillus Calmette-Monica (or BCG). Brand Name(s): Thergeovany Lynn BCG   There may be other brand names for this medicine. When This Medicine Should Not Be Used: You should not use this medicine if your immune system is weak due to other medicines or illness. You should not use this medicine if you have a fever (unless your doctor knows the cause) or if you have a bladder infection. You may not be able to receive this medicine if you have active tuberculosis, or blood in your urine. How to Use This Medicine:   Liquid  · Do not drink fluids for 4 hours before your treatment. Empty your bladder before you receive this medicine. · This medicine is given by running the liquid through a tube (catheter) into your bladder. The tube is then taken out, and you will hold the medicine in your bladder for two hours. · While this medicine is in the bladder, you should lie on your back, stomach, and each side for 15 minutes in each position. · You can get up after the first hour, but hold the medicine in your bladder for 1 more hour. If you are unable to hold it, tell your doctor or nurse. · Treatment with BCG may be given once a week for 6 weeks and approximately each month afterward for 6 to 12 months, or as your doctor orders. · Drink plenty of water during each treatment. · After each treatment, all patients (men and women) should sit down to urinate. · Any time you urinate in the first 6 hours after treatment, disinfect the urine by adding an equal amount (usually about 8 ounces, or 1 cup) of household bleach to the toilet bowl and letting it stand for 15 minutes before flushing. · A nurse or other health provider will give you this medicine.   Drugs and Foods to Avoid:   Ask your doctor or pharmacist before using any other medicine, including over-the-counter medicines, vitamins, and herbal products. · Make sure your doctor knows if you are also using a medicine to treat an infection or tuberculosis. Some tuberculosis medicines are ethambutol, isoniazid, and rifampin. Medicines to treat an infection (antibiotics) include amoxicillin, azithromycin, penicillin, Augmentin®, Bactrim®, and Cipro®. · Tell your doctor if you are using any medicine that weakens your immune system, such as steroids or cancer treatments. Some steroids are dexamethasone, prednisolone, prednisone, and Medrol®. Warnings While Using This Medicine:   · Before you begin your treatment, make sure your doctor knows if you are pregnant or breast feeding. Do not get pregnant while you are being treated with this medicine. · Tell your doctor if you have any changes in urinary habits after treatment with this medicine. Changes include urinating more or less than usual or having trouble urinating. · Some people develop infections after receiving this medicine. Call your doctor right away if you start to have flu-like symptoms. These symptoms include chills, weakness, and fever (103 degrees or higher) that lasts longer than 72 hours. · Your doctor will do lab tests at regular visits to check on the effects of this medicine. Keep all appointments. Possible Side Effects While Using This Medicine:   Call your doctor right away if you notice any of these side effects:  · Blood in your urine. · Burning or pain when you urinate. · Fever (103 degrees F or higher), chills, or extreme tiredness. · Severe skin rash. · Uncontrollable shaking or trembling. · Urinating more than usual, or feeling like you have to urinate more often. If you notice these less serious side effects, talk with your doctor:   · Cough. · Mild skin rash. · Nausea or vomiting. · Pain in your joints.   If you notice other side effects that you think are caused by this medicine, tell your doctor. Call your doctor for medical advice about side effects. You may report side effects to FDA at 0-379-YXA-6794  © 2017 [address] [employer] Information is for End User's use only and may not be sold, redistributed or otherwise used for commercial purposes. The above information is an  only. It is not intended as medical advice for individual conditions or treatments. Talk to your doctor, nurse or pharmacist before following any medical regimen to see if it is safe and effective for you.

## 2017-07-11 NOTE — MR AVS SNAPSHOT
Visit Information Date & Time Provider Department Dept. Phone Encounter #  
 7/11/2017 10:30 AM Mercedez Maciel, Shaniqua Man [facility] [phone] Your Appointments 7/18/2017 10:00 AM  
PROCEDURE with Lisa Gamboa MD  
[facility] [address] [place] [address] Road) Appt Note: BCG #2/Dr Dooley's patient [address] [place] [address] [address]  
[phone] [phone]  
  
    
 7/25/2017 10:15 AM  
PROCEDURE with Harlan Mata MD  
[facility] [address] [place] [address] Road) Appt Note: BCG #3  
 [address] [place] [address] [address]  
[phone] [phone]  
  
    
 8/1/2017 10:15 AM  
PROCEDURE with Harlan Mata MD  
[facility] [address]) Appt Note: BCG #4  
 [address] [place] [address]  
[phone]  
  
    
 8/8/2017 10:00 AM  
PROCEDURE with Mercedez Maciel MD  
[facility] [address] [place] [address] Road) Appt Note: BCG #5  
 [address] [place] [address] [address]  
[phone] [phone]  
  
    
 8/15/2017 10:30 AM  
PROCEDURE with Mercedez Maciel MD  
[facility] [address] [place], [place] [address]er Road) Appt Note: BCG #6  
 [address] [place] [address] [address]  
[phone] Upcoming Health Maintenance Date Due Hepatitis C Screening 1960 Pneumococcal 19-64 Highest Risk (1 of 3 - PCV13) 5/4/1981 DTaP/Tdap/Td series (1 - Tdap) 5/4/1983 FOBT Q 1 YEAR AGE 50-75 5/4/2012 INFLUENZA AGE 9 TO ADULT 8/1/2017 Allergies as of 7/11/2017  Review Complete On: 7/11/2017 By: Mercedez Maciel MD  
 No Known Allergies Current Immunizations  Never Reviewed No immunizations on file. Not reviewed this visit You Were Diagnosed With   
  
 Codes Comments Malignant neoplasm of urinary bladder, unspecified site [facility])    -  Primary ICD-10-CM: C67.9 ICD-9-CM: 188. 9 Vitals BP Pulse Temp Height(growth percentile) Weight(growth percentile) SpO2  
 (!) 140/100 (BP 1 Location: Left arm, BP Patient Position: Sitting) (!) 54 97.5 °F (36.4 °C) (Oral) 5' 6\" (1.676 m) 186 lb (84.4 kg) 99% BMI Smoking Status 30.02 kg/m2 Former Smoker BMI and BSA Data Body Mass Index Body Surface Area 30.02 kg/m 2 1.98 m 2 Preferred Pharmacy Pharmacy Name Phone [address], [address] [place] [address] [phone] Your Updated Medication List  
  
   
This list is accurate as of: 7/11/17 10:35 AM.  Always use your most recent med list.  
  
  
  
  
 BCG live 50 mg injection Commonly known as:  ANA 50 mg by IntraVESical route once for 1 dose. docusate sodium 100 mg capsule Commonly known as:  Arturo Lints Take 1 Cap by mouth two (2) times a day for 90 days. gabapentin 800 mg tablet Commonly known as:  NEURONTIN Take 800 mg by mouth three (3) times daily. HYDROcodone-acetaminophen 5-325 mg per tablet Commonly known as:  NORCO  
1 tab every 6 hours when necessary pain  
  
 ibuprofen 600 mg tablet Commonly known as:  MOTRIN Take 1 Tab by mouth every six (6) hours as needed for Pain.  
  
 losartan 50 mg tablet Commonly known as:  COZAAR  
50 mg daily. methocarbamol 500 mg tablet Commonly known as:  ROBAXIN Take 2 Tabs by mouth four (4) times daily as needed. naproxen 500 mg tablet Commonly known as:  NAPROSYN  
1 tab po bid po prn pain  
  
 nicotine 14 mg/24 hr patch Commonly known as:  NICODERM CQ  
APPLY 1 PATCH TOPICALLY EVERY 24 HOURS FOR 6 WEEKS We Performed the Following AMB POC URINALYSIS DIP STICK AUTO W/O MICRO [37640 CPT(R)] BCG LIVE INTRAVESICAL VAC [ [facility]] [place] INSTILL ANTICANCER AGENT IN BLADDER [id number] CPT(R)] Patient Instructions Bacillus of [address] (Inside the bladder) Bacillus of [place] (ba-SILL-us of Vasu-met and SHELL-in) Treats or prevents bladder cancer, and used to prevent certain bladder tumors. Also called Bacillus Calmette-Monica (or BCG). Brand Name(s): Theracys, [place] BCG There may be other brand names for this medicine. When This Medicine Should Not Be Used: You should not use this medicine if your immune system is weak due to other medicines or illness. You should not use this medicine if you have a fever (unless your doctor knows the cause) or if you have a bladder infection. You may not be able to receive this medicine if you have active tuberculosis, or blood in your urine. How to Use This Medicine:  
Liquid · Do not drink fluids for 4 hours before your treatment. Empty your bladder before you receive this medicine. · This medicine is given by running the liquid through a tube (catheter) into your bladder. The tube is then taken out, and you will hold the medicine in your bladder for two hours. · While this medicine is in the bladder, you should lie on your back, stomach, and each side for 15 minutes in each position. · You can get up after the first hour, but hold the medicine in your bladder for 1 more hour. If you are unable to hold it, tell your doctor or nurse. · Treatment with BCG may be given once a week for 6 weeks and approximately each month afterward for 6 to 12 months, or as your doctor orders. · Drink plenty of water during each treatment. · After each treatment, all patients (men and women) should sit down to urinate. · Any time you urinate in the first 6 hours after treatment, disinfect the urine by adding an equal amount (usually about 8 ounces, or 1 cup) of household bleach to the toilet bowl and letting it stand for 15 minutes before flushing. · A nurse or other health provider will give you this medicine. Drugs and Foods to Avoid: Ask your doctor or pharmacist before using any other medicine, including over-the-counter medicines, vitamins, and herbal products. · Make sure your doctor knows if you are also using a medicine to treat an infection or tuberculosis. Some tuberculosis medicines are ethambutol, isoniazid, and rifampin. Medicines to treat an infection (antibiotics) include amoxicillin, azithromycin, penicillin, Augmentin®, Bactrim®, and Cipro®. · Tell your doctor if you are using any medicine that weakens your immune system, such as steroids or cancer treatments. Some steroids are dexamethasone, prednisolone, prednisone, and Medrol®. Warnings While Using This Medicine: · Before you begin your treatment, make sure your doctor knows if you are pregnant or breast feeding. Do not get pregnant while you are being treated with this medicine. · Tell your doctor if you have any changes in urinary habits after treatment with this medicine. Changes include urinating more or less than usual or having trouble urinating. · Some people develop infections after receiving this medicine. Call your doctor right away if you start to have flu-like symptoms. These symptoms include chills, weakness, and fever (103 degrees or higher) that lasts longer than 72 hours. · Your doctor will do lab tests at regular visits to check on the effects of this medicine. Keep all appointments. Possible Side Effects While Using This Medicine:  
Call your doctor right away if you notice any of these side effects: · Blood in your urine. · Burning or pain when you urinate. · Fever (103 degrees F or higher), chills, or extreme tiredness. · Severe skin rash. · Uncontrollable shaking or trembling. · Urinating more than usual, or feeling like you have to urinate more often. If you notice these less serious side effects, talk with your doctor: · Cough. · Mild skin rash. · Nausea or vomiting. · Pain in your joints. If you notice other side effects that you think are caused by this medicine, tell your doctor. Call your doctor for medical advice about side effects. You may report side effects to FDA at 0-877-JFA-2668 © 2017 [address] Information is for End User's use only and may not be sold, redistributed or otherwise used for commercial purposes. The above information is an  only. It is not intended as medical advice for individual conditions or treatments. Talk to your doctor, nurse or pharmacist before following any medical regimen to see if it is safe and effective for you. Introducing [facility]! Dana White introduces [employer] patient portal. Now you can access parts of your medical record, email your doctor's office, and request medication refills online. 1. In your internet browser, go to [email or web address] 2. Click on the First Time User? Click Here link in the Sign In box. You will see the New Member Sign Up page. 3. Enter your [employer] Access Code exactly as it appears below. You will not need to use this code after youve completed the sign-up process. If you do not sign up before the expiration date, you must request a new code. · [employer] Access Code: [email or web address] Expires: 10/9/2017 10:35 AM 
 
4. Enter the last four digits of your Social Security Number (xxxx) and Date of Birth (mm/dd/yyyy) as indicated and click Submit. You will be taken to the next sign-up page. 5. Create a [facility] ID. This will be your [employer] login ID and cannot be changed, so think of one that is secure and easy to remember. 6. Create a [employer] password. You can change your password at any time. 7. Enter your Password Reset Question and Answer. This can be used at a later time if you forget your password. 8. Enter your e-mail address.  You will receive e-mail notification when new information is available in [employer]. 9. Click Sign Up. You can now view and download portions of your medical record. 10. Click the Download Summary menu link to download a portable copy of your medical information. If you have questions, please visit the Frequently Asked Questions section of the [employer] website. Remember, [employer] is NOT to be used for urgent needs. For medical emergencies, dial 911. Now available from your iPhone and Android! Please provide this summary of care documentation to your next provider. Your primary care clinician is listed as Tracey Bojorquez. If you have any questions after today's visit, please call [phone].

## 2017-07-18 ENCOUNTER — OFFICE VISIT (OUTPATIENT)
Dept: UROLOGY | Age: 57
End: 2017-07-18

## 2017-07-18 VITALS
WEIGHT: 184 LBS | BODY MASS INDEX: 29.57 KG/M2 | SYSTOLIC BLOOD PRESSURE: 110 MMHG | OXYGEN SATURATION: 98 % | DIASTOLIC BLOOD PRESSURE: 80 MMHG | HEART RATE: 48 BPM | TEMPERATURE: 98 F | HEIGHT: 66 IN

## 2017-07-18 DIAGNOSIS — C67.9 MALIGNANT NEOPLASM OF URINARY BLADDER, UNSPECIFIED SITE (HCC): Primary | ICD-10-CM

## 2017-07-18 LAB
BILIRUB UR QL STRIP: NEGATIVE
GLUCOSE UR-MCNC: NEGATIVE MG/DL
KETONES P FAST UR STRIP-MCNC: NEGATIVE MG/DL
PH UR STRIP: 6.5 [PH] (ref 4.6–8)
PROT UR QL STRIP: NEGATIVE MG/DL
SP GR UR STRIP: 1.01 (ref 1–1.03)
UA UROBILINOGEN AMB POC: NORMAL (ref 0.2–1)
URINALYSIS CLARITY POC: CLEAR
URINALYSIS COLOR POC: YELLOW
URINE BLOOD POC: NEGATIVE
URINE LEUKOCYTES POC: NEGATIVE
URINE NITRITES POC: NEGATIVE

## 2017-07-18 NOTE — PROGRESS NOTES
Mr. Iraida Mayes has a reminder for a \"due or due soon\" health maintenance. I have asked that he contact his primary care provider for follow-up on this health maintenance. [facility]  OFFICE PROCEDURE PROGRESS NOTE        Chart reviewed for the following:   ICb LPN, have reviewed the History, Physical and updated the Allergic reactions for [place] performed immediately prior to start of procedure:   Leron Leventhal, LPN, have performed the following reviews on Kyle Phoenix prior to the start of the procedure:            * Patient was identified by name and date of birth   * Agreement on procedure being performed was verified  * Risks and Benefits explained to the patient  * Procedure site verified and marked as necessary  * Patient was positioned for comfort  * Consent was signed and verified     Time: 10:42AM      Date of procedure: 7/18/2017    Procedure performed by:  Mario Alberto Gaytan MD    Provider assisted by: Holly Ellington LPN    Patient assisted by: self    How tolerated by patient: tolerated the procedure well with no complications    Post Procedural Pain Scale: 0 - No Hurt    Comments: none    Patient verbalized understanding of procedure and post procedure instructions.

## 2017-07-18 NOTE — PATIENT INSTRUCTIONS
Bacillus of [address] (Inside the bladder)   Bacillus of [place] (ba-SILL-us of Vasu-met and SHELL-in)  Treats or prevents bladder cancer, and used to prevent certain bladder tumors. Also called Bacillus Calmette-Monica (or BCG). Brand Name(s): Thergeovany [place] BCG   There may be other brand names for this medicine. When This Medicine Should Not Be Used: You should not use this medicine if your immune system is weak due to other medicines or illness. You should not use this medicine if you have a fever (unless your doctor knows the cause) or if you have a bladder infection. You may not be able to receive this medicine if you have active tuberculosis, or blood in your urine. How to Use This Medicine:   Liquid  · Do not drink fluids for 4 hours before your treatment. Empty your bladder before you receive this medicine. · This medicine is given by running the liquid through a tube (catheter) into your bladder. The tube is then taken out, and you will hold the medicine in your bladder for two hours. · While this medicine is in the bladder, you should lie on your back, stomach, and each side for 15 minutes in each position. · You can get up after the first hour, but hold the medicine in your bladder for 1 more hour. If you are unable to hold it, tell your doctor or nurse. · Treatment with BCG may be given once a week for 6 weeks and approximately each month afterward for 6 to 12 months, or as your doctor orders. · Drink plenty of water during each treatment. · After each treatment, all patients (men and women) should sit down to urinate. · Any time you urinate in the first 6 hours after treatment, disinfect the urine by adding an equal amount (usually about 8 ounces, or 1 cup) of household bleach to the toilet bowl and letting it stand for 15 minutes before flushing. · A nurse or other health provider will give you this medicine.   Drugs and Foods to Avoid:   Ask your doctor or pharmacist before using any other medicine, including over-the-counter medicines, vitamins, and herbal products. · Make sure your doctor knows if you are also using a medicine to treat an infection or tuberculosis. Some tuberculosis medicines are ethambutol, isoniazid, and rifampin. Medicines to treat an infection (antibiotics) include amoxicillin, azithromycin, penicillin, Augmentin®, Bactrim®, and Cipro®. · Tell your doctor if you are using any medicine that weakens your immune system, such as steroids or cancer treatments. Some steroids are dexamethasone, prednisolone, prednisone, and Medrol®. Warnings While Using This Medicine:   · Before you begin your treatment, make sure your doctor knows if you are pregnant or breast feeding. Do not get pregnant while you are being treated with this medicine. · Tell your doctor if you have any changes in urinary habits after treatment with this medicine. Changes include urinating more or less than usual or having trouble urinating. · Some people develop infections after receiving this medicine. Call your doctor right away if you start to have flu-like symptoms. These symptoms include chills, weakness, and fever (103 degrees or higher) that lasts longer than 72 hours. · Your doctor will do lab tests at regular visits to check on the effects of this medicine. Keep all appointments. Possible Side Effects While Using This Medicine:   Call your doctor right away if you notice any of these side effects:  · Blood in your urine. · Burning or pain when you urinate. · Fever (103 degrees F or higher), chills, or extreme tiredness. · Severe skin rash. · Uncontrollable shaking or trembling. · Urinating more than usual, or feeling like you have to urinate more often. If you notice these less serious side effects, talk with your doctor:   · Cough. · Mild skin rash. · Nausea or vomiting. · Pain in your joints.   If you notice other side effects that you think are caused by this medicine, tell your doctor. Call your doctor for medical advice about side effects. You may report side effects to FDA at 5-275-YXU-6067  © 2017 [address] Information is for End User's use only and may not be sold, redistributed or otherwise used for commercial purposes. The above information is an  only. It is not intended as medical advice for individual conditions or treatments. Talk to your doctor, nurse or pharmacist before following any medical regimen to see if it is safe and effective for you.

## 2017-07-18 NOTE — MR AVS SNAPSHOT
Visit Information Date & Time Provider Department Dept. Phone Encounter #  
 7/18/2017 10:00 AM Michelle Carmella, [address] [place] [address] [id number] Your Appointments 7/25/2017 10:15 AM  
PROCEDURE with Salvatore Jordan MD  
[facility]) Appt Note: BCG #3  
 [address] [place] [address] [address]  
[phone] [phone]  
  
    
 8/1/2017 10:15 AM  
PROCEDURE with Salvatore Jordan MD  
[facility]) Appt Note: BCG #4  
 [address] [place] [address]  
[phone]  
  
    
 8/8/2017 10:00 AM  
PROCEDURE with Majo Gaytan MD  
[facility]) Appt Note: BCG #5  
 [address] [place] [address] [address]  
[phone] [phone]  
  
    
 8/15/2017 10:30 AM  
PROCEDURE with Majo Gaytan, MD  
[facility]) Appt Note: BCG #6  
 [address] [place] [address] [address]  
[phone] Upcoming Health Maintenance Date Due Hepatitis C Screening 1960 Pneumococcal 19-64 Highest Risk (1 of 3 - PCV13) 5/4/1981 DTaP/Tdap/Td series (1 - Tdap) 5/4/1983 FOBT Q 1 YEAR AGE 50-75 5/4/2012 INFLUENZA AGE 9 TO ADULT 8/1/2017 Allergies as of 7/18/2017  Review Complete On: 7/18/2017 By: Mor Cordova LPN No Known Allergies Current Immunizations  Never Reviewed No immunizations on file. Not reviewed this visit You Were Diagnosed With   
  
 Codes Comments Malignant neoplasm of urinary bladder, unspecified site [facility])    -  Primary ICD-10-CM: C67.9 ICD-9-CM: 188. 9 Vitals BP Pulse Temp Height(growth percentile) Weight(growth percentile) SpO2 110/80 (BP 1 Location: Left arm, BP Patient Position: Sitting) (!) 48 98 °F (36.7 °C) (Oral) 5' 6\" (1.676 m) 184 lb (83.5 kg) 98% BMI Smoking Status 29.7 kg/m2 Former Smoker Vitals History BMI and BSA Data Body Mass Index Body Surface Area  
 29.7 kg/m 2 1.97 m 2 Preferred Pharmacy Pharmacy Name Phone [address], [address] [place] [address] [phone] Your Updated Medication List  
  
   
This list is accurate as of: 7/18/17 10:34 AM.  Always use your most recent med list.  
  
  
  
  
 docusate sodium 100 mg capsule Commonly known as:  Hermina  Take 1 Cap by mouth two (2) times a day for 90 days. erythromycin ophthalmic ointment Commonly known as:  ILOTYCIN  
1 ribbon 4 times a day  
  
 gabapentin 800 mg tablet Commonly known as:  NEURONTIN Take 800 mg by mouth three (3) times daily. HYDROcodone-acetaminophen 5-325 mg per tablet Commonly known as:  NORCO  
1 tab every 6 hours when necessary pain  
  
 ibuprofen 600 mg tablet Commonly known as:  MOTRIN Take 1 Tab by mouth every six (6) hours as needed for Pain.  
  
 losartan 50 mg tablet Commonly known as:  COZAAR  
50 mg daily. methocarbamol 500 mg tablet Commonly known as:  ROBAXIN Take 2 Tabs by mouth four (4) times daily as needed. naproxen 500 mg tablet Commonly known as:  NAPROSYN  
1 tab po bid po prn pain  
  
 nicotine 14 mg/24 hr patch Commonly known as:  NICODERM CQ  
APPLY 1 PATCH TOPICALLY EVERY 24 HOURS FOR 6 WEEKS We Performed the Following AMB POC URINALYSIS DIP STICK AUTO W/O MICRO [89323 CPT(R)] Patient Instructions Bacillus of [employer] (Inside the bladder) Bacillus of [place] and Monica (ba-SILL-us of Vasu-met and SHELL-in) Treats or prevents bladder cancer, and used to prevent certain bladder tumors. Also called Bacillus Calmette-Monica (or BCG). Brand Name(s): [employer]marissas Lynn BCG There may be other brand names for this medicine. When This Medicine Should Not Be Used: You should not use this medicine if your immune system is weak due to other medicines or illness. You should not use this medicine if you have a fever (unless your doctor knows the cause) or if you have a bladder infection. You may not be able to receive this medicine if you have active tuberculosis, or blood in your urine. How to Use This Medicine:  
Liquid · Do not drink fluids for 4 hours before your treatment. Empty your bladder before you receive this medicine. · This medicine is given by running the liquid through a tube (catheter) into your bladder. The tube is then taken out, and you will hold the medicine in your bladder for two hours. · While this medicine is in the bladder, you should lie on your back, stomach, and each side for 15 minutes in each position. · You can get up after the first hour, but hold the medicine in your bladder for 1 more hour. If you are unable to hold it, tell your doctor or nurse. · Treatment with BCG may be given once a week for 6 weeks and approximately each month afterward for 6 to 12 months, or as your doctor orders. · Drink plenty of water during each treatment. · After each treatment, all patients (men and women) should sit down to urinate. · Any time you urinate in the first 6 hours after treatment, disinfect the urine by adding an equal amount (usually about 8 ounces, or 1 cup) of household bleach to the toilet bowl and letting it stand for 15 minutes before flushing. · A nurse or other health provider will give you this medicine. Drugs and Foods to Avoid: Ask your doctor or pharmacist before using any other medicine, including over-the-counter medicines, vitamins, and herbal products. · Make sure your doctor knows if you are also using a medicine to treat an infection or tuberculosis.  Some tuberculosis medicines are ethambutol, isoniazid, and rifampin. Medicines to treat an infection (antibiotics) include amoxicillin, azithromycin, penicillin, Augmentin®, Bactrim®, and Cipro®. · Tell your doctor if you are using any medicine that weakens your immune system, such as steroids or cancer treatments. Some steroids are dexamethasone, prednisolone, prednisone, and Medrol®. Warnings While Using This Medicine: · Before you begin your treatment, make sure your doctor knows if you are pregnant or breast feeding. Do not get pregnant while you are being treated with this medicine. · Tell your doctor if you have any changes in urinary habits after treatment with this medicine. Changes include urinating more or less than usual or having trouble urinating. · Some people develop infections after receiving this medicine. Call your doctor right away if you start to have flu-like symptoms. These symptoms include chills, weakness, and fever (103 degrees or higher) that lasts longer than 72 hours. · Your doctor will do lab tests at regular visits to check on the effects of this medicine. Keep all appointments. Possible Side Effects While Using This Medicine:  
Call your doctor right away if you notice any of these side effects: · Blood in your urine. · Burning or pain when you urinate. · Fever (103 degrees F or higher), chills, or extreme tiredness. · Severe skin rash. · Uncontrollable shaking or trembling. · Urinating more than usual, or feeling like you have to urinate more often. If you notice these less serious side effects, talk with your doctor: · Cough. · Mild skin rash. · Nausea or vomiting. · Pain in your joints. If you notice other side effects that you think are caused by this medicine, tell your doctor. Call your doctor for medical advice about side effects. You may report side effects to FDA at 8-481-FDA-3766 © 2017 [address] Information is for End User's use only and may not be sold, redistributed or otherwise used for commercial purposes. The above information is an  only. It is not intended as medical advice for individual conditions or treatments. Talk to your doctor, nurse or pharmacist before following any medical regimen to see if it is safe and effective for you. Introducing hospitals & HEALTH SERVICES! [employer] introduces [employer]ia patient portal. Now you can access parts of your medical record, email your doctor's office, and request medication refills online. 1. In your internet browser, go to [email or web address] 2. Click on the First Time User? Click Here link in the Sign In box. You will see the New Member Sign Up page. 3. Enter your Prefundia Access Code exactly as it appears below. You will not need to use this code after youve completed the sign-up process. If you do not sign up before the expiration date, you must request a new code. · Prefundia Access Code: [email or web address] Expires: 10/9/2017 10:35 AM 
 
4. Enter the last four digits of your Social Security Number (xxxx) and Date of Birth (mm/dd/yyyy) as indicated and click Submit. You will be taken to the next sign-up page. 5. Create a Prefundia ID. This will be your Prefundia login ID and cannot be changed, so think of one that is secure and easy to remember. 6. Create a Prefundia password. You can change your password at any time. 7. Enter your Password Reset Question and Answer. This can be used at a later time if you forget your password. 8. Enter your e-mail address. You will receive e-mail notification when new information is available in [address] 9. Click Sign Up. You can now view and download portions of your medical record. 10. Click the Download Summary menu link to download a portable copy of your medical information.  
 
If you have questions, please visit the Frequently Asked Questions section of the [employer]. Remember, [employer] is NOT to be used for urgent needs. For medical emergencies, dial 911. Now available from your iPhone and Android! Please provide this summary of care documentation to your next provider. Your primary care clinician is listed as Jose Ramey. If you have any questions after today's visit, please call [phone].
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
## 2017-07-25 ENCOUNTER — OFFICE VISIT (OUTPATIENT)
Dept: UROLOGY | Age: 57
End: 2017-07-25

## 2017-07-25 VITALS
DIASTOLIC BLOOD PRESSURE: 84 MMHG | BODY MASS INDEX: 30.37 KG/M2 | HEIGHT: 66 IN | SYSTOLIC BLOOD PRESSURE: 150 MMHG | OXYGEN SATURATION: 98 % | HEART RATE: 54 BPM | WEIGHT: 189 LBS

## 2017-07-25 DIAGNOSIS — C67.2 MALIGNANT NEOPLASM OF LATERAL WALL OF URINARY BLADDER (HCC): Primary | ICD-10-CM

## 2017-07-25 LAB
BILIRUB UR QL STRIP: NEGATIVE
GLUCOSE UR-MCNC: NEGATIVE MG/DL
KETONES P FAST UR STRIP-MCNC: NEGATIVE MG/DL
PH UR STRIP: 6.5 [PH] (ref 4.6–8)
PROT UR QL STRIP: NEGATIVE MG/DL
SP GR UR STRIP: 1 (ref 1–1.03)
UA UROBILINOGEN AMB POC: NORMAL (ref 0.2–1)
URINALYSIS CLARITY POC: CLEAR
URINALYSIS COLOR POC: YELLOW
URINE BLOOD POC: NEGATIVE
URINE LEUKOCYTES POC: NEGATIVE
URINE NITRITES POC: NEGATIVE

## 2017-07-25 NOTE — PROCEDURES
Under satisfactory skin prep the catheter was inserted into the urinary bladder and it was drained of clear urine. Following this 50 cc of freshly prepared BCG was instilled into the bladder completely. This is installation #3 and he will return in 1 week for installation 4 of his 6 week course of induction.

## 2017-07-25 NOTE — MR AVS SNAPSHOT
Visit Information Date & Time Provider Department Dept. Phone Encounter #  
 7/25/2017 10:15 AM Andrew Dahl MD [facility] [phone] [id number] Your Appointments 8/1/2017 10:15 AM  
PROCEDURE with Andrew Dahl MD  
[facility]) Appt Note: BCG #4  
 [address] [place] [address] [address]  
[phone] [phone]  
  
    
 8/8/2017 10:00 AM  
PROCEDURE with Trevor López MD  
[facility]) Appt Note: BCG #5  
 [address] [place] [address] [address]  
[phone] [phone]  
  
    
 8/15/2017 10:30 AM  
PROCEDURE with Trevor López MD  
[facility]) Appt Note: BCG #6  
 [address] [place] [address] [address]  
[phone] Upcoming Health Maintenance Date Due Hepatitis C Screening 1960 Pneumococcal 19-64 Highest Risk (1 of 3 - PCV13) 5/4/1981 DTaP/Tdap/Td series (1 - Tdap) 5/4/1983 FOBT Q 1 YEAR AGE 50-75 5/4/2012 INFLUENZA AGE 9 TO ADULT 8/1/2017 Allergies as of 7/25/2017  Review Complete On: 7/18/2017 By: Lazaro Resendiz MD  
 No Known Allergies Current Immunizations  Never Reviewed No immunizations on file. Not reviewed this visit You Were Diagnosed With   
  
 Codes Comments Malignant neoplasm of urinary bladder, unspecified site [facility])    -  Primary ICD-10-CM: C67.9 ICD-9-CM: 188. 9 Vitals BP Pulse Height(growth percentile) Weight(growth percentile) SpO2 BMI  
 150/84 (BP 1 Location: Left arm, BP Patient Position: Sitting) (!) 54 5' 6\" (1.676 m) 189 lb (85.7 kg) 98% 30.51 kg/m2 Smoking Status Former Smoker Vitals History BMI and BSA Data Body Mass Index Body Surface Area  30.51 kg/m 2 2 m 2  
 Preferred Pharmacy Pharmacy Name Phone [address], [address] [facility] [phone] Your Updated Medication List  
  
   
This list is accurate as of: 7/25/17 10:59 AM.  Always use your most recent med list.  
  
  
  
  
 docusate sodium 100 mg capsule Commonly known as:  Nisa Officer Take 1 Cap by mouth two (2) times a day for 90 days. gabapentin 800 mg tablet Commonly known as:  NEURONTIN Take 800 mg by mouth three (3) times daily. HYDROcodone-acetaminophen 5-325 mg per tablet Commonly known as:  NORCO  
1 tab every 6 hours when necessary pain  
  
 ibuprofen 600 mg tablet Commonly known as:  MOTRIN Take 1 Tab by mouth every six (6) hours as needed for Pain.  
  
 losartan 50 mg tablet Commonly known as:  COZAAR  
50 mg daily. methocarbamol 500 mg tablet Commonly known as:  ROBAXIN Take 2 Tabs by mouth four (4) times daily as needed. naproxen 500 mg tablet Commonly known as:  NAPROSYN  
1 tab po bid po prn pain  
  
 nicotine 14 mg/24 hr patch Commonly known as:  NICODERM CQ  
APPLY 1 PATCH TOPICALLY EVERY 24 HOURS FOR 6 WEEKS We Performed the Following AMB POC URINALYSIS DIP STICK AUTO W/O MICRO [17589 CPT(R)] Patient Instructions Bacillus of [address] (Inside the bladder) Bacillus of [place] and [place] (ba-SILL-us of Vasu-met and SHELL-in) Treats or prevents bladder cancer, and used to prevent certain bladder tumors. Also called Bacillus Calmette-Monica (or BCG). Brand Name(s): Lynn Anderson BCG There may be other brand names for this medicine. When This Medicine Should Not Be Used: You should not use this medicine if your immune system is weak due to other medicines or illness. You should not use this medicine if you have a fever (unless your doctor knows the cause) or if you have a bladder infection.  You may not be able to receive this medicine if you have active tuberculosis, or blood in your urine. How to Use This Medicine:  
Liquid · Do not drink fluids for 4 hours before your treatment. Empty your bladder before you receive this medicine. · This medicine is given by running the liquid through a tube (catheter) into your bladder. The tube is then taken out, and you will hold the medicine in your bladder for two hours. · While this medicine is in the bladder, you should lie on your back, stomach, and each side for 15 minutes in each position. · You can get up after the first hour, but hold the medicine in your bladder for 1 more hour. If you are unable to hold it, tell your doctor or nurse. · Treatment with BCG may be given once a week for 6 weeks and approximately each month afterward for 6 to 12 months, or as your doctor orders. · Drink plenty of water during each treatment. · After each treatment, all patients (men and women) should sit down to urinate. · Any time you urinate in the first 6 hours after treatment, disinfect the urine by adding an equal amount (usually about 8 ounces, or 1 cup) of household bleach to the toilet bowl and letting it stand for 15 minutes before flushing. · A nurse or other health provider will give you this medicine. Drugs and Foods to Avoid: Ask your doctor or pharmacist before using any other medicine, including over-the-counter medicines, vitamins, and herbal products. · Make sure your doctor knows if you are also using a medicine to treat an infection or tuberculosis. Some tuberculosis medicines are ethambutol, isoniazid, and rifampin. Medicines to treat an infection (antibiotics) include amoxicillin, azithromycin, penicillin, Augmentin®, Bactrim®, and Cipro®. · Tell your doctor if you are using any medicine that weakens your immune system, such as steroids or cancer treatments. Some steroids are dexamethasone, prednisolone, prednisone, and Medrol®. Warnings While Using This Medicine: · Before you begin your treatment, make sure your doctor knows if you are pregnant or breast feeding. Do not get pregnant while you are being treated with this medicine. · Tell your doctor if you have any changes in urinary habits after treatment with this medicine. Changes include urinating more or less than usual or having trouble urinating. · Some people develop infections after receiving this medicine. Call your doctor right away if you start to have flu-like symptoms. These symptoms include chills, weakness, and fever (103 degrees or higher) that lasts longer than 72 hours. · Your doctor will do lab tests at regular visits to check on the effects of this medicine. Keep all appointments. Possible Side Effects While Using This Medicine:  
Call your doctor right away if you notice any of these side effects: · Blood in your urine. · Burning or pain when you urinate. · Fever (103 degrees F or higher), chills, or extreme tiredness. · Severe skin rash. · Uncontrollable shaking or trembling. · Urinating more than usual, or feeling like you have to urinate more often. If you notice these less serious side effects, talk with your doctor: · Cough. · Mild skin rash. · Nausea or vomiting. · Pain in your joints. If you notice other side effects that you think are caused by this medicine, tell your doctor. Call your doctor for medical advice about side effects. You may report side effects to FDA at 2-627-FDA-4877 © 2017 [address] Information is for End User's use only and may not be sold, redistributed or otherwise used for commercial purposes. The above information is an  only. It is not intended as medical advice for individual conditions or treatments. Talk to your doctor, nurse or pharmacist before following any medical regimen to see if it is safe and effective for you. Introducing [facility] & HEALTH SERVICES! [facility] introduces [employer] patient portal. Now you can access parts of your medical record, email your doctor's office, and request medication refills online. 1. In your internet browser, go to [email or web address] 2. Click on the First Time User? Click Here link in the Sign In box. You will see the New Member Sign Up page. 3. Enter your [employer] Access Code exactly as it appears below. You will not need to use this code after youve completed the sign-up process. If you do not sign up before the expiration date, you must request a new code. · [employer] Access Code: [email or web address] Expires: 10/9/2017 10:35 AM 
 
4. Enter the last four digits of your Social Security Number (xxxx) and Date of Birth (mm/dd/yyyy) as indicated and click Submit. You will be taken to the next sign-up page. 5. Create a Naehas ID. This will be your Naehas login ID and cannot be changed, so think of one that is secure and easy to remember. 6. Create a N[employer]has password. You can change your password at any time. 7. Enter your Password Reset Question and Answer. This can be used at a later time if you forget your password. 8. Enter your e-mail address. You will receive e-mail notification when new information is available in [address] 9. Click Sign Up. You can now view and download portions of your medical record. 10. Click the Download Summary menu link to download a portable copy of your medical information. If you have questions, please visit the Frequently Asked Questions section of the Naehas website. Remember, [employer] is NOT to be used for urgent needs. For medical emergencies, dial 911. Now available from your iPhone and Android! Please provide this summary of care documentation to your next provider. Your primary care clinician is listed as Beryle Merry. If you have any questions after today's visit, please call [phone].
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
## 2017-07-25 NOTE — PROGRESS NOTES
[facility]  OFFICE PROCEDURE PROGRESS NOTE        Chart reviewed for the following:   Dennis PETTIT LPN, have reviewed the History, Physical and updated the Allergic reactions for [place] performed immediately prior to start of procedure:   Devan Maurice LPN, have performed the following reviews on Canales Nares prior to the start of the procedure:            * Patient was identified by name and date of birth   * Agreement on procedure being performed was verified  * Risks and Benefits explained to the patient  * Procedure site verified and marked as necessary  * Patient was positioned for comfort  * Consent was signed and verified     Time: 10:52AM      Date of procedure: 7/25/2017    Procedure performed by:  Mel Wang MD    Provider assisted by: John Ferrari LPN    Patient assisted by: self    How tolerated by patient: tolerated the procedure well with no complications    Post Procedural Pain Scale: 0 - No Hurt    Comments: none    Patient verbalized understanding of procedure and post procedure instructions.

## 2017-07-25 NOTE — PATIENT INSTRUCTIONS
Bacillus of [address] (Inside the bladder)   Bacillus of Calmet[place] and Monica (ba-SILL-us of Vasu-met and SHELL-in)  Treats or prevents bladder cancer, and used to prevent certain bladder tumors. Also called Bacillus Calmette-Monica (or BCG). Brand Name(s): Thergeovany Lynn BCG   There may be other brand names for this medicine. When This Medicine Should Not Be Used: You should not use this medicine if your immune system is weak due to other medicines or illness. You should not use this medicine if you have a fever (unless your doctor knows the cause) or if you have a bladder infection. You may not be able to receive this medicine if you have active tuberculosis, or blood in your urine. How to Use This Medicine:   Liquid  · Do not drink fluids for 4 hours before your treatment. Empty your bladder before you receive this medicine. · This medicine is given by running the liquid through a tube (catheter) into your bladder. The tube is then taken out, and you will hold the medicine in your bladder for two hours. · While this medicine is in the bladder, you should lie on your back, stomach, and each side for 15 minutes in each position. · You can get up after the first hour, but hold the medicine in your bladder for 1 more hour. If you are unable to hold it, tell your doctor or nurse. · Treatment with BCG may be given once a week for 6 weeks and approximately each month afterward for 6 to 12 months, or as your doctor orders. · Drink plenty of water during each treatment. · After each treatment, all patients (men and women) should sit down to urinate. · Any time you urinate in the first 6 hours after treatment, disinfect the urine by adding an equal amount (usually about 8 ounces, or 1 cup) of household bleach to the toilet bowl and letting it stand for 15 minutes before flushing. · A nurse or other health provider will give you this medicine.   Drugs and Foods to Avoid:   Ask your doctor or pharmacist before using any other medicine, including over-the-counter medicines, vitamins, and herbal products. · Make sure your doctor knows if you are also using a medicine to treat an infection or tuberculosis. Some tuberculosis medicines are ethambutol, isoniazid, and rifampin. Medicines to treat an infection (antibiotics) include amoxicillin, azithromycin, penicillin, Augmentin®, Bactrim®, and Cipro®. · Tell your doctor if you are using any medicine that weakens your immune system, such as steroids or cancer treatments. Some steroids are dexamethasone, prednisolone, prednisone, and Medrol®. Warnings While Using This Medicine:   · Before you begin your treatment, make sure your doctor knows if you are pregnant or breast feeding. Do not get pregnant while you are being treated with this medicine. · Tell your doctor if you have any changes in urinary habits after treatment with this medicine. Changes include urinating more or less than usual or having trouble urinating. · Some people develop infections after receiving this medicine. Call your doctor right away if you start to have flu-like symptoms. These symptoms include chills, weakness, and fever (103 degrees or higher) that lasts longer than 72 hours. · Your doctor will do lab tests at regular visits to check on the effects of this medicine. Keep all appointments. Possible Side Effects While Using This Medicine:   Call your doctor right away if you notice any of these side effects:  · Blood in your urine. · Burning or pain when you urinate. · Fever (103 degrees F or higher), chills, or extreme tiredness. · Severe skin rash. · Uncontrollable shaking or trembling. · Urinating more than usual, or feeling like you have to urinate more often. If you notice these less serious side effects, talk with your doctor:   · Cough. · Mild skin rash. · Nausea or vomiting. · Pain in your joints.   If you notice other side effects that you think are caused by this medicine, tell your doctor. Call your doctor for medical advice about side effects. You may report side effects to FDA at 8-309-JDR-0346  © 2017 [facility] Information is for End User's use only and may not be sold, redistributed or otherwise used for commercial purposes. The above information is an  only. It is not intended as medical advice for individual conditions or treatments. Talk to your doctor, nurse or pharmacist before following any medical regimen to see if it is safe and effective for you.

## 2017-07-25 NOTE — PROGRESS NOTES
Mr. Amy Talavera has a reminder for a \"due or due soon\" health maintenance. I have asked that he contact his primary care provider for follow-up on this health maintenance.

## 2017-08-01 ENCOUNTER — OFFICE VISIT (OUTPATIENT)
Dept: UROLOGY | Age: 57
End: 2017-08-01

## 2017-08-01 VITALS
SYSTOLIC BLOOD PRESSURE: 142 MMHG | HEART RATE: 55 BPM | WEIGHT: 188 LBS | DIASTOLIC BLOOD PRESSURE: 88 MMHG | OXYGEN SATURATION: 98 % | HEIGHT: 66 IN | BODY MASS INDEX: 30.22 KG/M2

## 2017-08-01 DIAGNOSIS — C67.2 MALIGNANT NEOPLASM OF LATERAL WALL OF URINARY BLADDER (HCC): Primary | ICD-10-CM

## 2017-08-01 NOTE — PROCEDURES
Under satisfactory skin prep with Betadine, the Singh catheter was inserted and about 60 cc of clear yellow urine was obtained. 50 cc of freshly prepared BCG was then instilled into the bladder with no problems. He tolerated this well and will be seen again in 1 week for installation #5.

## 2017-08-01 NOTE — MR AVS SNAPSHOT
Visit Information Date & Time Provider Department Dept. Phone Encounter #  
 8/1/2017 10:15 AM Duane Bastos, MD [facility] [phone] [id number] Your Appointments 8/8/2017 10:00 AM  
PROCEDURE with Sammi Lux MD  
[facility] Appt Note: BCG #5  
 [address] [place] [address] [address]  
[phone] [address] [id number]  
  
    
 8/15/2017 10:30 AM  
PROCEDURE with Sammi Lux MD  
[facility] Appt Note: BCG #6  
 [address] [place] [address] [address]  
[phone] Upcoming Health Maintenance Date Due Hepatitis C Screening 1960 Pneumococcal 19-64 Highest Risk (1 of 3 - PCV13) 5/4/1981 DTaP/Tdap/Td series (1 - Tdap) 5/4/1983 FOBT Q 1 YEAR AGE 50-75 5/4/2012 INFLUENZA AGE 9 TO ADULT 8/1/2017 Allergies as of 8/1/2017  Review Complete On: 8/1/2017 By: Cb Yuen LPN No Known Allergies Current Immunizations  Never Reviewed No immunizations on file. Not reviewed this visit You Were Diagnosed With   
  
 Codes Comments Malignant neoplasm of urinary bladder, unspecified site [facility])    -  Primary ICD-10-CM: C67.9 ICD-9-CM: 188. 9 Vitals BP Pulse Height(growth percentile) Weight(growth percentile) SpO2 BMI  
 142/88 (BP 1 Location: Left arm, BP Patient Position: Sitting) (!) 55 5' 6\" (1.676 m) 188 lb (85.3 kg) 98% 30.34 kg/m2 Smoking Status Former Smoker Vitals History BMI and BSA Data Body Mass Index Body Surface Area  
 30.34 kg/m 2 1.99 m 2 Preferred Pharmacy Pharmacy Name Phone [address], [address] [place] [address] [phone] Your Updated Medication List  
  
   
This list is accurate as of: 8/1/17 10:35 AM.  Always use your most recent med list.  
  
  
  
  
 BCG live 50 mg injection Commonly known as:  ANA 50 mL by IntraVESical route once for 1 dose. docusate sodium 100 mg capsule Commonly known as:  Dana Felt Take 1 Cap by mouth two (2) times a day for 90 days. gabapentin 800 mg tablet Commonly known as:  NEURONTIN Take 800 mg by mouth three (3) times daily. HYDROcodone-acetaminophen 5-325 mg per tablet Commonly known as:  NORCO  
1 tab every 6 hours when necessary pain  
  
 ibuprofen 600 mg tablet Commonly known as:  MOTRIN Take 1 Tab by mouth every six (6) hours as needed for Pain.  
  
 losartan 50 mg tablet Commonly known as:  COZAAR  
50 mg daily. methocarbamol 500 mg tablet Commonly known as:  ROBAXIN Take 2 Tabs by mouth four (4) times daily as needed. naproxen 500 mg tablet Commonly known as:  NAPROSYN  
1 tab po bid po prn pain  
  
 nicotine 14 mg/24 hr patch Commonly known as:  NICODERM CQ  
APPLY 1 PATCH TOPICALLY EVERY 24 HOURS FOR 6 WEEKS We Performed the Following AMB POC URINALYSIS DIP STICK AUTO W/O MICRO [24109 CPT(R)] BCG LIVE INTRAVESICAL VAC [ [facility]] [place] INSTILL ANTICANCER AGENT IN BLADDER [id number] CPT(R)] Introducing [facility] SERVICES! Car Florentino introduces Rapleaf patient portal. Now you can access parts of your medical record, email your doctor's office, and request medication refills online. 1. In your internet browser, go to [email or web address] 2. Click on the First Time User? Click Here link in the Sign In box. You will see the New Member Sign Up page. 3. Enter your Rapleaf Access Code exactly as it appears below. You will not need to use this code after youve completed the sign-up process. If you do not sign up before the expiration date, you must request a new code. · [employer]leaf Access Code: [email or web address] Expires: 10/9/2017 10:35 AM 
 
 4. Enter the last four digits of your Social Security Number (xxxx) and Date of Birth (mm/dd/yyyy) as indicated and click Submit. You will be taken to the next sign-up page. 5. Create a Ketchuppp ID. This will be your Ketchuppp login ID and cannot be changed, so think of one that is secure and easy to remember. 6. Create a Ketchuppp password. You can change your password at any time. 7. Enter your Password Reset Question and Answer. This can be used at a later time if you forget your password. 8. Enter your e-mail address. You will receive e-mail notification when new information is available in [address] 9. Click Sign Up. You can now view and download portions of your medical record. 10. Click the Download Summary menu link to download a portable copy of your medical information. If you have questions, please visit the Frequently Asked Questions section of the Ketchuppp website. Remember, Ketchuppp is NOT to be used for urgent needs. For medical emergencies, dial 911. Now available from your iPhone and Android! Please provide this summary of care documentation to your next provider. Your primary care clinician is listed as Sol Pineda. If you have any questions after today's visit, please call [phone].
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
## 2017-08-01 NOTE — PROGRESS NOTES
RBV Per Dr. Paula Garrison order placed for BCG instilled into bladder by Dr. Palua Garrison today in office. Mr. Krysta Ramos has a reminder for a \"due or due soon\" health maintenance. I have asked that he contact his primary care provider for follow-up on this health maintenance. [facility]  OFFICE PROCEDURE PROGRESS NOTE        Chart reviewed for the following:   I, Benjaman Kocher, LPN, have reviewed the History, Physical and updated the Allergic reactions for [place] performed immediately prior to start of procedure:   Doe Eli LPN, have performed the following reviews on Larcherie Friendly prior to the start of the procedure:            * Patient was identified by name and date of birth   * Agreement on procedure being performed was verified  * Risks and Benefits explained to the patient  * Procedure site verified and marked as necessary  * Patient was positioned for comfort  * Consent was signed and verified     Time: 10:10AM      Date of procedure: 8/1/2017    Procedure performed by:  Elodia Best MD    Provider assisted by: Harini Melendrez LPN    Patient assisted by: self    How tolerated by patient: tolerated the procedure well with no complications    Post Procedural Pain Scale: 0 - No Hurt    Comments: none    Patient verbalized understanding of procedure and post procedure instructions.

## 2017-08-08 ENCOUNTER — OFFICE VISIT (OUTPATIENT)
Dept: UROLOGY | Age: 57
End: 2017-08-08

## 2017-08-08 VITALS
SYSTOLIC BLOOD PRESSURE: 152 MMHG | HEIGHT: 66 IN | DIASTOLIC BLOOD PRESSURE: 100 MMHG | WEIGHT: 189 LBS | OXYGEN SATURATION: 98 % | TEMPERATURE: 98 F | BODY MASS INDEX: 30.37 KG/M2

## 2017-08-08 DIAGNOSIS — C67.9 MALIGNANT NEOPLASM OF BLADDER WALL (HCC): Primary | ICD-10-CM

## 2017-08-08 LAB
BILIRUB UR QL STRIP: NEGATIVE
GLUCOSE UR-MCNC: NEGATIVE MG/DL
KETONES P FAST UR STRIP-MCNC: NEGATIVE MG/DL
PH UR STRIP: 6 [PH] (ref 4.6–8)
PROT UR QL STRIP: NEGATIVE MG/DL
SP GR UR STRIP: 1.01 (ref 1–1.03)
UA UROBILINOGEN AMB POC: NORMAL (ref 0.2–1)
URINALYSIS CLARITY POC: CLEAR
URINALYSIS COLOR POC: YELLOW
URINE BLOOD POC: NEGATIVE
URINE LEUKOCYTES POC: NEGATIVE
URINE NITRITES POC: NEGATIVE

## 2017-08-08 NOTE — PROGRESS NOTES
Patient returns for his fifth BCG installation and will return next week for his sixth instillation. I have again explained to him the maintenance therapy and the patient will get a series of 3 instillations one week apart and that will be 3 months after his sixth treatment next week    This visit exceeded 10 minutes, independent of the BCG treatment and involving review of the process of maintenance therapy and surveillance cystoscopy and greater than 50% was counseling. The patient expresses understanding of the treatment plan and wishes to proceed    This dictation used voice recognition software and there may be mistakes.     Amaury Martinez MD

## 2017-08-08 NOTE — PROGRESS NOTES
[facility]  OFFICE PROCEDURE PROGRESS NOTE        Chart reviewed for the following:   Conrad PETTIT LPN, have reviewed the History, Physical and updated the Allergic reactions for [place] performed immediately prior to start of procedure:   Asia Le LPN, have performed the following reviews on Rushie Loud prior to the start of the procedure:            * Patient was identified by name and date of birth   * Agreement on procedure being performed was verified  * Risks and Benefits explained to the patient  * Procedure site verified and marked as necessary  * Patient was positioned for comfort  * Consent was signed and verified     Time: 10:26AM    Date of procedure: 8/8/2017    Procedure performed by:  Eliane Gibbs MD    Provider assisted by: Betsy Perales LPN    Patient assisted by: self    How tolerated by patient: tolerated the procedure well with no complications    Post Procedural Pain Scale: 0 - No Hurt    Comments: none    Patient verbalized understanding of procedure and post procedure instructions.

## 2017-08-08 NOTE — PATIENT INSTRUCTIONS
Bacillus of Calmette and Monica Vaccine, Live (By injection)   Bacillus of [place] and Monica (ba-SILL-us of Vasu-met and SHELL-in)  Prevents tuberculosis (TB). This medicine is a vaccine, and is also called BCG vaccine. Brand Name(s): BCG Vaccine   There may be other brand names for this medicine. When This Medicine Should Not Be Used: You or your child should not receive this shot if you have a weak immune system for any reason. You should not receive this shot if you have an allergy to BCG vaccine. You or your child should not receive this shot if you have a disease known as HIV or AIDS. You should not receive this shot if you have a severe skin disease or skin infection. How to Use This Medicine:   Injectable  · Your doctor will prescribe your exact dose and tell you how often it should be given. This medicine is given as a shot under your skin. · A nurse or other health provider will give you this medicine. · This medicine can be given to a baby, a young child, or an adult. Drugs and Foods to Avoid:   Ask your doctor or pharmacist before using any other medicine, including over-the-counter medicines, vitamins, and herbal products. · This medicine may interfere with vaccines. Ask your doctor before you get a flu shot or any other vaccines. Warnings While Using This Medicine:   · Make sure your doctor knows if you are pregnant or breast feeding. · Make sure your doctor knows if you have ever had a bad reaction to BCG vaccine. · Make sure your doctor knows if you or your child have a disease that weakens the immune system. · Tell your doctor about any person that you live with who has a weak immune system.   Possible Side Effects While Using This Medicine:   Call your doctor right away if you notice any of these side effects:  · Allergic reaction: Itching or hives, swelling in your face or hands, swelling or tingling in your mouth or throat, chest tightness, trouble breathing  · Extreme fussiness, crying, or discomfort in a young child. · High fever, chills, or vomiting. · Infection, swelling, or severe pain where the shot was given. · Severe headache, drowsiness, or body aches. If you notice these less serious side effects, talk with your doctor:   · Large red sore on your skin where the shot was given. · Mild fever or a very sore arm that lasts more than a few days. · Pain in your joints. · Pain, itching, burning, swelling, or a lump under the skin where the shot was given. If you notice other side effects that you think are caused by this medicine, tell your doctor. Call your doctor for medical advice about side effects. You may report side effects to FDA at 6-546-FDA-9193  © 2017 [address] [employer] Information is for End User's use only and may not be sold, redistributed or otherwise used for commercial purposes. The above information is an  only. It is not intended as medical advice for individual conditions or treatments. Talk to your doctor, nurse or pharmacist before following any medical regimen to see if it is safe and effective for you.

## 2017-08-08 NOTE — MR AVS SNAPSHOT
Visit Information Date & Time Provider Department Dept. Phone Encounter #  
 8/8/2017 10:00 AM Shaniqua Shirley [place] [facility] [phone] Follow-up Instructions Return in about 1 week (around 8/15/2017). Your Appointments 8/15/2017 10:30 AM  
PROCEDURE with Lissette Esparza MD  
[facility] Jules Sandhoff) Appt Note: BCG #6  
 [address] [place] [address] [address] [phone] [address] 600 [facility] [address] Upcoming Health Maintenance Date Due Hepatitis C Screening 1960 Pneumococcal 19-64 Highest Risk (1 of 3 - PCV13) 5/4/1981 DTaP/Tdap/Td series (1 - Tdap) 5/4/1983 FOBT Q 1 YEAR AGE 50-75 5/4/2012 INFLUENZA AGE 9 TO ADULT 8/1/2017 Allergies as of 8/8/2017  Review Complete On: 8/8/2017 By: Lissette Esparza MD  
 No Known Allergies Current Immunizations  Never Reviewed No immunizations on file. Not reviewed this visit You Were Diagnosed With   
  
 Codes Comments Malignant neoplasm of bladder wall (HCC)    -  Primary ICD-10-CM: C67.9 ICD-9-CM: 188. 9 Vitals BP Temp Height(growth percentile) Weight(growth percentile) SpO2 BMI  
 (!) 152/100 (BP 1 Location: Left arm, BP Patient Position: Sitting) 98 °F (36.7 °C) (Oral) 5' 6\" (1.676 m) 189 lb (85.7 kg) 98% 30.51 kg/m2 Smoking Status Former Smoker BMI and BSA Data Body Mass Index Body Surface Area 30.51 kg/m 2 2 m 2 Preferred Pharmacy Pharmacy Name Phone [address], [address] [place] [address] [phone] Your Updated Medication List  
  
   
This list is accurate as of: 8/8/17 10:53 AM.  Always use your most recent med list.  
  
  
  
  
 BCG live 50 mg injection Commonly known as:  ANA 50 mL by IntraVESical route once for 1 dose. docusate sodium 100 mg capsule Commonly known as:  Walterine Melville Take 1 Cap by mouth two (2) times a day for 90 days. gabapentin 800 mg tablet Commonly known as:  NEURONTIN Take 800 mg by mouth three (3) times daily. HYDROcodone-acetaminophen 5-325 mg per tablet Commonly known as:  NORCO  
1 tab every 6 hours when necessary pain  
  
 ibuprofen 600 mg tablet Commonly known as:  MOTRIN Take 1 Tab by mouth every six (6) hours as needed for Pain.  
  
 losartan 50 mg tablet Commonly known as:  COZAAR  
50 mg daily. methocarbamol 500 mg tablet Commonly known as:  ROBAXIN Take 2 Tabs by mouth four (4) times daily as needed. naproxen 500 mg tablet Commonly known as:  NAPROSYN  
1 tab po bid po prn pain  
  
 nicotine 14 mg/24 hr patch Commonly known as:  NICODERM CQ  
APPLY 1 PATCH TOPICALLY EVERY 24 HOURS FOR 6 WEEKS We Performed the Following AMB POC URINALYSIS DIP STICK AUTO W/O MICRO [30747 CPT(R)] BCG LIVE INTRAVESICAL VAC [ [facility]] [place] INSTILL ANTICANCER AGENT IN BLADDER [id number] CPT(R)] Follow-up Instructions Return in about 1 week (around 8/15/2017). Patient Instructions Bacillus of Calmet[place] and Monica Vaccine, Live (By injection) Bacillus of [place] and Monica (ba-SILL-us of Vasu-met and SHELL-in) Prevents tuberculosis (TB). This medicine is a vaccine, and is also called BCG vaccine. Brand Name(s): BCG Vaccine There may be other brand names for this medicine. When This Medicine Should Not Be Used: You or your child should not receive this shot if you have a weak immune system for any reason. You should not receive this shot if you have an allergy to BCG vaccine. You or your child should not receive this shot if you have a disease known as HIV or AIDS. You should not receive this shot if you have a severe skin disease or skin infection. How to Use This Medicine:  
Injectable · Your doctor will prescribe your exact dose and tell you how often it should be given. This medicine is given as a shot under your skin. · A nurse or other health provider will give you this medicine. · This medicine can be given to a baby, a young child, or an adult. Drugs and Foods to Avoid: Ask your doctor or pharmacist before using any other medicine, including over-the-counter medicines, vitamins, and herbal products. · This medicine may interfere with vaccines. Ask your doctor before you get a flu shot or any other vaccines. Warnings While Using This Medicine: · Make sure your doctor knows if you are pregnant or breast feeding. · Make sure your doctor knows if you have ever had a bad reaction to BCG vaccine. · Make sure your doctor knows if you or your child have a disease that weakens the immune system. · Tell your doctor about any person that you live with who has a weak immune system. Possible Side Effects While Using This Medicine:  
Call your doctor right away if you notice any of these side effects: · Allergic reaction: Itching or hives, swelling in your face or hands, swelling or tingling in your mouth or throat, chest tightness, trouble breathing · Extreme fussiness, crying, or discomfort in a young child. · High fever, chills, or vomiting. · Infection, swelling, or severe pain where the shot was given. · Severe headache, drowsiness, or body aches. If you notice these less serious side effects, talk with your doctor: · Large red sore on your skin where the shot was given. · Mild fever or a very sore arm that lasts more than a few days. · Pain in your joints. · Pain, itching, burning, swelling, or a lump under the skin where the shot was given. If you notice other side effects that you think are caused by this medicine, tell your doctor. Call your doctor for medical advice about side effects. You may report side effects to FDA at 9-123-FDA-5578 © 2017 [address] [employer] Information is for End User's use only and may not be sold, redistributed or otherwise used for commercial purposes. The above information is an  only. It is not intended as medical advice for individual conditions or treatments. Talk to your doctor, nurse or pharmacist before following any medical regimen to see if it is safe and effective for you. Introducing [facility]! David Chou introduces [employer] patient portal. Now you can access parts of your medical record, email your doctor's office, and request medication refills online. 1. In your internet browser, go to [email or web address] 2. Click on the First Time User? Click Here link in the Sign In box. You will see the New Member Sign Up page. 3. Enter your [employer] Access Code exactly as it appears below. You will not need to use this code after youve completed the sign-up process. If you do not sign up before the expiration date, you must request a new code. · [employer] Access Code: [email or web address] Expires: 10/9/2017 10:35 AM 
 
4. Enter the last four digits of your Social Security Number (xxxx) and Date of Birth (mm/dd/yyyy) as indicated and click Submit. You will be taken to the next sign-up page. 5. Create a [employer] ID. This will be your [employer] login ID and cannot be changed, so think of one that is secure and easy to remember. 6. Create a [employer] password. You can change your password at any time. 7. Enter your Password Reset Question and Answer. This can be used at a later time if you forget your password. 8. Enter your e-mail address. You will receive e-mail notification when new information is available in [address] 9. Click Sign Up. You can now view and download portions of your medical record. 10. Click the Download Summary menu link to download a portable copy of your medical information.  
 
If you have questions, please visit the Frequently Asked Questions section of the [employer]. Remember, [employer] is NOT to be used for urgent needs. For medical emergencies, dial 911. Now available from your iPhone and Android! Please provide this summary of care documentation to your next provider. Your primary care clinician is listed as Renée Marcial. If you have any questions after today's visit, please call [phone].

## 2017-08-08 NOTE — PROGRESS NOTES
Mr. Luna cMkeon has a reminder for a \"due or due soon\" health maintenance. I have asked that he contact his primary care provider for follow-up on this health maintenance.

## 2017-08-15 ENCOUNTER — OFFICE VISIT (OUTPATIENT)
Dept: UROLOGY | Age: 57
End: 2017-08-15

## 2017-08-15 VITALS
SYSTOLIC BLOOD PRESSURE: 148 MMHG | HEIGHT: 66 IN | OXYGEN SATURATION: 98 % | HEART RATE: 71 BPM | DIASTOLIC BLOOD PRESSURE: 105 MMHG | WEIGHT: 188 LBS | BODY MASS INDEX: 30.22 KG/M2

## 2017-08-15 DIAGNOSIS — C67.9 MALIGNANT NEOPLASM OF URINARY BLADDER, UNSPECIFIED SITE (HCC): Primary | ICD-10-CM

## 2017-08-15 LAB
BILIRUB UR QL STRIP: NEGATIVE
GLUCOSE UR-MCNC: NEGATIVE MG/DL
KETONES P FAST UR STRIP-MCNC: NEGATIVE MG/DL
PH UR STRIP: 5.5 [PH] (ref 4.6–8)
PROT UR QL STRIP: NEGATIVE MG/DL
SP GR UR STRIP: 1 (ref 1–1.03)
UA UROBILINOGEN AMB POC: NORMAL (ref 0.2–1)
URINALYSIS CLARITY POC: CLEAR
URINALYSIS COLOR POC: YELLOW
URINE BLOOD POC: NEGATIVE
URINE LEUKOCYTES POC: NORMAL
URINE NITRITES POC: NEGATIVE

## 2017-08-15 NOTE — MR AVS SNAPSHOT
Visit Information Date & Time Provider Department Dept. Phone Encounter #  
 8/15/2017 10:30 AM Shaniqua Brock [place] [facility] [phone] [id number] Follow-up Instructions Return in about 1 month (around 9/15/2017) for cystoscopy. Your Appointments 9/12/2017  9:15 AM  
PROCEDURE with Imtiaz Manrique MD  
[facility] [address] [place] [address] Road) Appt Note: cysto per Dr Madina Neil [address] [place] [address] [address]  
[phone] [phone]  
  
    
 11/7/2017 10:00 AM  
PROCEDURE with Imtiaz Manrique MD  
[facility] [address] [place] [address] Road) Appt Note: BCG #1  
 [address] [place] [address] [address]  
[phone]  
  
    
 11/14/2017 10:15 AM  
PROCEDURE with La Nena Romeo MD  
[facility] [address] [place] [address] Road) Appt Note: BCG #2  
 [address] [place] [address] [address]  
[phone] [phone]  
  
    
 11/21/2017  9:15 AM  
PROCEDURE with La Nena Romeo MD  
[facility] [address] [place] [address] Road) Appt Note: BCG #3  
 [address] [place] [address] [address]  
[phone] Upcoming Health Maintenance Date Due Hepatitis C Screening 1960 Pneumococcal 19-64 Highest Risk (1 of 3 - PCV13) 5/4/1981 DTaP/Tdap/Td series (1 - Tdap) 5/4/1983 FOBT Q 1 YEAR AGE 50-75 5/4/2012 INFLUENZA AGE 9 TO ADULT 8/1/2017 Allergies as of 8/15/2017  Review Complete On: 8/15/2017 By: Imtiaz Manrique MD  
 No Known Allergies Current Immunizations  Never Reviewed No immunizations on file. Not reviewed this visit You Were Diagnosed With   
  
 Codes Comments Malignant neoplasm of urinary bladder, unspecified site [facility])    -  Primary ICD-10-CM: C67.9 ICD-9-CM: 188. 9 Vitals BP Pulse Height(growth percentile) Weight(growth percentile) SpO2 BMI  
 (!) 148/105 (BP 1 Location: Right arm, BP Patient Position: Sitting) 71 5' 6\" (1.676 m) 188 lb (85.3 kg) 98% 30.34 kg/m2 Smoking Status Former Smoker Vitals History BMI and BSA Data Body Mass Index Body Surface Area  
 30.34 kg/m 2 1.99 m 2 Preferred Pharmacy Pharmacy Name Phone [address], [address] [place] [address] [phone] Your Updated Medication List  
  
   
This list is accurate as of: 8/15/17 11:59 AM.  Always use your most recent med list.  
  
  
  
  
 BCG live 50 mg injection Commonly known as:  ANA 50 mg by IntraVESical route once for 1 dose. docusate sodium 100 mg capsule Commonly known as:  Dana Felt Take 1 Cap by mouth two (2) times a day for 90 days. gabapentin 800 mg tablet Commonly known as:  NEURONTIN Take 800 mg by mouth three (3) times daily. HYDROcodone-acetaminophen 5-325 mg per tablet Commonly known as:  NORCO  
1 tab every 6 hours when necessary pain  
  
 ibuprofen 600 mg tablet Commonly known as:  MOTRIN Take 1 Tab by mouth every six (6) hours as needed for Pain.  
  
 losartan 50 mg tablet Commonly known as:  COZAAR  
50 mg daily. methocarbamol 500 mg tablet Commonly known as:  ROBAXIN Take 2 Tabs by mouth four (4) times daily as needed. naproxen 500 mg tablet Commonly known as:  NAPROSYN  
1 tab po bid po prn pain  
  
 nicotine 14 mg/24 hr patch Commonly known as:  NICODERM CQ  
APPLY 1 PATCH TOPICALLY EVERY 24 HOURS FOR 6 WEEKS We Performed the Following AMB POC URINALYSIS DIP STICK AUTO W/O MICRO [36512 CPT(R)] BCG LIVE INTRAVESICAL VAC [ [facility]] [place] INSTILL ANTICANCER AGENT IN BLADDER [id number] CPT(R)] Follow-up Instructions Return in about 1 month (around 9/15/2017) for cystoscopy. Patient Instructions Bacillus of [address] (Inside the bladder) Bacillus of Calmette and Monica (ba-SILL-us of Vasu-met and SHELL-in) Treats or prevents bladder cancer, and used to prevent certain bladder tumors. Also called Bacillus Calmette-Monica (or BCG). Brand Name(s): Justin Lynn BCG There may be other brand names for this medicine. When This Medicine Should Not Be Used: You should not use this medicine if your immune system is weak due to other medicines or illness. You should not use this medicine if you have a fever (unless your doctor knows the cause) or if you have a bladder infection. You may not be able to receive this medicine if you have active tuberculosis, or blood in your urine. How to Use This Medicine:  
Liquid · Do not drink fluids for 4 hours before your treatment. Empty your bladder before you receive this medicine. · This medicine is given by running the liquid through a tube (catheter) into your bladder. The tube is then taken out, and you will hold the medicine in your bladder for two hours. · While this medicine is in the bladder, you should lie on your back, stomach, and each side for 15 minutes in each position. · You can get up after the first hour, but hold the medicine in your bladder for 1 more hour. If you are unable to hold it, tell your doctor or nurse. · Treatment with BCG may be given once a week for 6 weeks and approximately each month afterward for 6 to 12 months, or as your doctor orders. · Drink plenty of water during each treatment. · After each treatment, all patients (men and women) should sit down to urinate. · Any time you urinate in the first 6 hours after treatment, disinfect the urine by adding an equal amount (usually about 8 ounces, or 1 cup) of household bleach to the toilet bowl and letting it stand for 15 minutes before flushing. · A nurse or other health provider will give you this medicine. Drugs and Foods to Avoid: Ask your doctor or pharmacist before using any other medicine, including over-the-counter medicines, vitamins, and herbal products. · Make sure your doctor knows if you are also using a medicine to treat an infection or tuberculosis. Some tuberculosis medicines are ethambutol, isoniazid, and rifampin. Medicines to treat an infection (antibiotics) include amoxicillin, azithromycin, penicillin, Augmentin®, Bactrim®, and Cipro®. · Tell your doctor if you are using any medicine that weakens your immune system, such as steroids or cancer treatments. Some steroids are dexamethasone, prednisolone, prednisone, and Medrol®. Warnings While Using This Medicine: · Before you begin your treatment, make sure your doctor knows if you are pregnant or breast feeding. Do not get pregnant while you are being treated with this medicine. · Tell your doctor if you have any changes in urinary habits after treatment with this medicine. Changes include urinating more or less than usual or having trouble urinating. · Some people develop infections after receiving this medicine. Call your doctor right away if you start to have flu-like symptoms. These symptoms include chills, weakness, and fever (103 degrees or higher) that lasts longer than 72 hours. · Your doctor will do lab tests at regular visits to check on the effects of this medicine. Keep all appointments. Possible Side Effects While Using This Medicine:  
Call your doctor right away if you notice any of these side effects: · Blood in your urine. · Burning or pain when you urinate. · Fever (103 degrees F or higher), chills, or extreme tiredness. · Severe skin rash. · Uncontrollable shaking or trembling. · Urinating more than usual, or feeling like you have to urinate more often. If you notice these less serious side effects, talk with your doctor: · Cough. · Mild skin rash. · Nausea or vomiting. · Pain in your joints. If you notice other side effects that you think are caused by this medicine, tell your doctor. Call your doctor for medical advice about side effects. You may report side effects to FDA at 8-005-YBB-5092 © 2017 [facility] Information is for End User's use only and may not be sold, redistributed or otherwise used for commercial purposes. The above information is an  only. It is not intended as medical advice for individual conditions or treatments. Talk to your doctor, nurse or pharmacist before following any medical regimen to see if it is safe and effective for you. Introducing [address]! [facility] introduces [employer] patient portal. Now you can access parts of your medical record, email your doctor's office, and request medication refills online. 1. In your internet browser, go to [email or web address] 2. Click on the First Time User? Click Here link in the Sign In box. You will see the New Member Sign Up page. 3. Enter your [employer] Access Code exactly as it appears below. You will not need to use this code after youve completed the sign-up process. If you do not sign up before the expiration date, you must request a new code. · [employer] Access Code: [email or web address] Expires: 10/9/2017 10:35 AM 
 
4. Enter the last four digits of your Social Security Number (xxxx) and Date of Birth (mm/dd/yyyy) as indicated and click Submit. You will be taken to the next sign-up page. 5. Create a [employer] ID. This will be your [employer] login ID and cannot be changed, so think of one that is secure and easy to remember. 6. Create a [employer] password. You can change your password at any time. 7. Enter your Password Reset Question and Answer. This can be used at a later time if you forget your password. 8. Enter your e-mail address. You will receive e-mail notification when new information is available in [address] 9. Click Sign Up. You can now view and download portions of your medical record. 10. Click the Download Summary menu link to download a portable copy of your medical information. If you have questions, please visit the Frequently Asked Questions section of the [employer] website. Remember, [employer] is NOT to be used for urgent needs. For medical emergencies, dial 911. Now available from your iPhone and Android! Please provide this summary of care documentation to your next provider. Your primary care clinician is listed as Marycruz Wells. If you have any questions after today's visit, please call [phone].

## 2017-08-15 NOTE — PATIENT INSTRUCTIONS
Bacillus of [address] (Inside the bladder)   Bacillus of [place] and [place] (ba-SILL-us of Vasu-met and SHELL-in)  Treats or prevents bladder cancer, and used to prevent certain bladder tumors. Also called Bacillus Calmette-Monica (or BCG). Brand Name(s): Thergeovany Lynn BCG   There may be other brand names for this medicine. When This Medicine Should Not Be Used: You should not use this medicine if your immune system is weak due to other medicines or illness. You should not use this medicine if you have a fever (unless your doctor knows the cause) or if you have a bladder infection. You may not be able to receive this medicine if you have active tuberculosis, or blood in your urine. How to Use This Medicine:   Liquid  · Do not drink fluids for 4 hours before your treatment. Empty your bladder before you receive this medicine. · This medicine is given by running the liquid through a tube (catheter) into your bladder. The tube is then taken out, and you will hold the medicine in your bladder for two hours. · While this medicine is in the bladder, you should lie on your back, stomach, and each side for 15 minutes in each position. · You can get up after the first hour, but hold the medicine in your bladder for 1 more hour. If you are unable to hold it, tell your doctor or nurse. · Treatment with BCG may be given once a week for 6 weeks and approximately each month afterward for 6 to 12 months, or as your doctor orders. · Drink plenty of water during each treatment. · After each treatment, all patients (men and women) should sit down to urinate. · Any time you urinate in the first 6 hours after treatment, disinfect the urine by adding an equal amount (usually about 8 ounces, or 1 cup) of household bleach to the toilet bowl and letting it stand for 15 minutes before flushing. · A nurse or other health provider will give you this medicine.   Drugs and Foods to Avoid:   Ask your doctor or pharmacist before using any other medicine, including over-the-counter medicines, vitamins, and herbal products. · Make sure your doctor knows if you are also using a medicine to treat an infection or tuberculosis. Some tuberculosis medicines are ethambutol, isoniazid, and rifampin. Medicines to treat an infection (antibiotics) include amoxicillin, azithromycin, penicillin, Augmentin®, Bactrim®, and Cipro®. · Tell your doctor if you are using any medicine that weakens your immune system, such as steroids or cancer treatments. Some steroids are dexamethasone, prednisolone, prednisone, and Medrol®. Warnings While Using This Medicine:   · Before you begin your treatment, make sure your doctor knows if you are pregnant or breast feeding. Do not get pregnant while you are being treated with this medicine. · Tell your doctor if you have any changes in urinary habits after treatment with this medicine. Changes include urinating more or less than usual or having trouble urinating. · Some people develop infections after receiving this medicine. Call your doctor right away if you start to have flu-like symptoms. These symptoms include chills, weakness, and fever (103 degrees or higher) that lasts longer than 72 hours. · Your doctor will do lab tests at regular visits to check on the effects of this medicine. Keep all appointments. Possible Side Effects While Using This Medicine:   Call your doctor right away if you notice any of these side effects:  · Blood in your urine. · Burning or pain when you urinate. · Fever (103 degrees F or higher), chills, or extreme tiredness. · Severe skin rash. · Uncontrollable shaking or trembling. · Urinating more than usual, or feeling like you have to urinate more often. If you notice these less serious side effects, talk with your doctor:   · Cough. · Mild skin rash. · Nausea or vomiting. · Pain in your joints.   If you notice other side effects that you think are caused by this medicine, tell your doctor. Call your doctor for medical advice about side effects. You may report side effects to FDA at 2-288-HCL-0552  © 2017 [address] Information is for End User's use only and may not be sold, redistributed or otherwise used for commercial purposes. The above information is an  only. It is not intended as medical advice for individual conditions or treatments. Talk to your doctor, nurse or pharmacist before following any medical regimen to see if it is safe and effective for you.

## 2017-08-15 NOTE — PROGRESS NOTES
The patient comes in and receives his sixth BCG treatment and the sedation protocol of maintenance BCG. The patient will be undergoing cystoscopy in 1 month. He will then begin his next series of 3 BCG weekly instillations 2 months after that    The protocol is again discussed with the patient    This visit exceeded 10 minutes, independent of the BCG installation and greater than 50% was counseling. The patient expresses understanding of the treatment plan and wishes to proceed    This dictation used voice recognition software and there may be mistakes.     Beckie Ba MD

## 2017-08-15 NOTE — PROGRESS NOTES
Mr. Bo Persaud has a reminder for a \"due or due soon\" health maintenance. I have asked that he contact his primary care provider for follow-up on this health maintenance. [facility]  OFFICE PROCEDURE PROGRESS NOTE        Chart reviewed for the following:   IDoyle LPN, have reviewed the History, Physical and updated the Allergic reactions for [place] performed immediately prior to start of procedure:   Inga Bustamante LPN, have performed the following reviews on Larissa Gals prior to the start of the procedure:            * Patient was identified by name and date of birth   * Agreement on procedure being performed was verified  * Risks and Benefits explained to the patient  * Procedure site verified and marked as necessary  * Patient was positioned for comfort  * Consent was signed and verified     Time: 10:50AM      Date of procedure: 8/15/2017    Procedure performed by:  Haylee Garza MD    Provider assisted by: Franco Engle LPN    Patient assisted by: self    How tolerated by patient: tolerated the procedure well with no complications    Post Procedural Pain Scale: 0 - No Hurt    Comments: none    Patient verbalized understanding of procedure and post procedure instructions.

## 2017-09-11 ENCOUNTER — OFFICE VISIT (OUTPATIENT)
Dept: ORTHOPEDIC SURGERY | Age: 57
End: 2017-09-11

## 2017-09-11 VITALS
SYSTOLIC BLOOD PRESSURE: 168 MMHG | HEART RATE: 52 BPM | DIASTOLIC BLOOD PRESSURE: 101 MMHG | HEIGHT: 66 IN | WEIGHT: 188 LBS | BODY MASS INDEX: 30.22 KG/M2 | OXYGEN SATURATION: 100 % | TEMPERATURE: 95.8 F | RESPIRATION RATE: 14 BRPM

## 2017-09-11 DIAGNOSIS — M47.817 SPONDYLOSIS OF LUMBOSACRAL REGION, UNSPECIFIED SPINAL OSTEOARTHRITIS COMPLICATION STATUS: ICD-10-CM

## 2017-09-11 DIAGNOSIS — M47.812 OSTEOARTHRITIS OF CERVICAL SPINE, UNSPECIFIED SPINAL OSTEOARTHRITIS COMPLICATION STATUS: Primary | ICD-10-CM

## 2017-09-11 RX ORDER — MELOXICAM 15 MG/1
TABLET ORAL
Qty: 90 TAB | Refills: 0 | OUTPATIENT
Start: 2017-09-11 | End: 2020-10-04

## 2017-09-11 RX ORDER — HYDROCODONE BITARTRATE AND ACETAMINOPHEN 7.5; 325 MG/1; MG/1
1 TABLET ORAL
Qty: 21 TAB | Refills: 0 | Status: SHIPPED | OUTPATIENT
Start: 2017-09-11 | End: 2017-09-18
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
## 2017-09-11 NOTE — PROGRESS NOTES
1. Have you been to the ER, urgent care clinic since your last visit? Hospitalized since your last visit? new to  practice    2. Have you seen or consulted any other health care providers outside of the [address] [place] [address] since your last visit? Include any pap smears or colon screening. new to  practice      Please see Red banners under Allergies, Med rec, Immunizations to remove outside inquires. All correct information has been verified with patient and added to chart.

## 2017-09-11 NOTE — MR AVS SNAPSHOT
Visit Information Date & Time Provider Department Dept. Phone Encounter #  
 9/11/2017  9:20 AM Fortino Riggs, [address] [facility] [phone] [id number] Follow-up Instructions Return if symptoms worsen or fail to improve, for cervical, lumbar OA. Routing History Your Appointments 9/12/2017  9:15 AM  
PROCEDURE with Kathryn Verdugo MD  
[facility]) Appt Note: cysto per Dr Maikel Monterroso [address] [place] [address] [address]  
[phone] [phone]  
  
    
 11/7/2017 10:00 AM  
PROCEDURE with Kathryn Verdugo MD  
[facility]) Appt Note: BCG #1  
 [address] [place] [address] [address]  
[phone]  
  
    
 11/14/2017 10:15 AM  
PROCEDURE with Clearance MD Jessika  
[facility]) Appt Note: BCG #2  
 [address] [place] [address] [address]  
[phone] [phone]  
  
    
 11/21/2017  9:15 AM  
PROCEDURE with Clearance MD Jessika  
[facility]) Appt Note: BCG #3  
 [address] [place] [address] [address]  
[phone] Upcoming Health Maintenance Date Due Hepatitis C Screening 1960 Pneumococcal 19-64 Highest Risk (1 of 3 - PCV13) 5/4/1981 DTaP/Tdap/Td series (1 - Tdap) 5/4/1983 FOBT Q 1 YEAR AGE 50-75 5/4/2012 INFLUENZA AGE 9 TO ADULT 8/1/2017 Allergies as of 9/11/2017  Review Complete On: 9/11/2017 By: Fortino Riggs, DO No Known Allergies Current Immunizations  Never Reviewed No immunizations on file. Not reviewed this visit You Were Diagnosed With   
  
 Codes Comments Osteoarthritis of cervical spine, unspecified spinal osteoarthritis complication status    -  Primary ICD-10-CM: U92.585 ICD-9-CM: 721.0 Spondylosis of lumbosacral region, unspecified spinal osteoarthritis complication status     FVK-16-FU: M47.817 ICD-9-CM: 721.3 Vitals BP Pulse Temp Resp Height(growth percentile) Weight(growth percentile) (!) 168/101 (BP 1 Location: Right arm, BP Patient Position: Sitting) (!) 52 95.8 °F (35.4 °C) (Oral) 14 5' 6\" (1.676 m) 188 lb (85.3 kg) SpO2 BMI Smoking Status 100% 30.34 kg/m2 Former Smoker Vitals History BMI and BSA Data Body Mass Index Body Surface Area  
 30.34 kg/m 2 1.99 m 2 Preferred Pharmacy Pharmacy Name Phone [address], [address] [place] [address] [phone] Your Updated Medication List  
  
   
This list is accurate as of: 9/11/17  9:59 AM.  Always use your most recent med list.  
  
  
  
  
 gabapentin 800 mg tablet Commonly known as:  NEURONTIN Take 800 mg by mouth three (3) times daily. HYDROcodone-acetaminophen 7.5-325 mg per tablet Commonly known as:  Bj Collar Take 1 Tab by mouth every eight (8) hours as needed for Pain for up to 7 days. Max Daily Amount: 3 Tabs. losartan 100 mg tablet Commonly known as:  COZAAR Take 100 mg by mouth daily. meloxicam 15 mg tablet Commonly known as:  MOBIC Take 1 tab daily or 1/2 tab twice daily as needed pain with food. methocarbamol 750 mg tablet Commonly known as:  ROBAXIN Take 1 Tab by mouth four (4) times daily as needed. Take as needed for muscle spasms  
  
 nicotine 14 mg/24 hr patch Commonly known as:  NICODERM CQ  
APPLY 1 PATCH TOPICALLY EVERY 24 HOURS FOR 6 WEEKS Prescriptions Printed Refills HYDROcodone-acetaminophen (NORCO) 7.5-325 mg per tablet 0 Sig: Take 1 Tab by mouth every eight (8) hours as needed for Pain for up to 7 days. Max Daily Amount: 3 Tabs. Class: Print Route: Oral  
  
Prescriptions Sent to Pharmacy Refills meloxicam (MOBIC) 15 mg tablet 0 Sig: Take 1 tab daily or 1/2 tab twice daily as needed pain with food. Class: Normal  
 Pharmacy: [address] , [address] [facility] Ph #: [phone] Follow-up Instructions Return if symptoms worsen or fail to improve, for cervical, lumbar OA. To-Do List   
 09/11/2017 Imaging:  XR SPINE CERV PA LAT ODONT 3 V MAX   
  
 09/11/2017 Imaging:  XR SPINE LUMB MIN 4 V Patient Instructions   
[place] Search YouTube for my channel: 
 
Dr. Halina Jurado Low back/Piriformis Neck Arthritis: Exercises Your Care Instructions Here are some examples of typical rehabilitation exercises for your condition. Start each exercise slowly. Ease off the exercise if you start to have pain. Your doctor or physical therapist will tell you when you can start these exercises and which ones will work best for you. How to do the exercises Neck stretches to the side 1. This stretch works best if you keep your shoulder down as you lean away from it. To help you remember to do this, start by relaxing your shoulders and lightly holding on to your thighs or your chair. 2. Tilt your head toward your shoulder and hold for 15 to 30 seconds. Let the weight of your head stretch your muscles. 3. Repeat 2 to 4 times toward each shoulder. Chin tuck 1. Lie on the floor with a rolled-up towel under your neck. Your head should be touching the floor. 2. Slowly bring your chin toward your chest. 
3. Hold for a count of 6, and then relax for up to 10 seconds. 4. Repeat 8 to 12 times. Active cervical rotation 1. Sit in a firm chair, or stand up straight. 2. Keeping your chin level, turn your head to the right, and hold for 15 to 30 seconds. 3. Turn your head to the left and hold for 15 to 30 seconds. 4. Repeat 2 to 4 times to each side. Shoulder blade squeeze 1. While standing, squeeze your shoulder blades together. 2. Do not raise your shoulders up as you are squeezing. 3. Hold for 6 seconds. 4. Repeat 8 to 12 times. Shoulder rolls 1. Sit comfortably with your feet shoulder-width apart. You can also do this exercise standing up. 2. Roll your shoulders up, then back, and then down in a smooth, circular motion. 3. Repeat 2 to 4 times. Follow-up care is a key part of your treatment and safety. Be sure to make and go to all appointments, and call your doctor if you are having problems. It's also a good idea to know your test results and keep a list of the medicines you take. Where can you learn more? Go to http://tramaine-alis.info/. Enter L696 in the search box to learn more about \"Neck Arthritis: Exercises. \" Current as of: March 21, 2017 Content Version: 11.3 © 7513-6906 [employer]. Care instructions adapted under license by [employer] (which disclaims liability or warranty for this information). If you have questions about a medical condition or this instruction, always ask your healthcare professional. [address] any warranty or liability for your use of this information. Low Back Arthritis: Exercises Your Care Instructions Here are some examples of typical rehabilitation exercises for your condition. Start each exercise slowly. Ease off the exercise if you start to have pain. Your doctor or physical therapist will tell you when you can start these exercises and which ones will work best for you. When you are not being active, find a comfortable position for rest. Some people are comfortable on the floor or a medium-firm bed with a small pillow under their head and another under their knees. Some people prefer to lie on their side with a pillow between their knees. Don't stay in one position for too long. Take short walks (10 to 20 minutes) every 2 to 3 hours.  Avoid slopes, hills, and stairs until you feel better. Walk only distances you can manage without pain, especially leg pain. How to do the exercises Pelvic tilt 4. Lie on your back with your knees bent. 5. \"Brace\" your stomachtighten your muscles by pulling in and imagining your belly button moving toward your spine. 6. Press your lower back into the floor. You should feel your hips and pelvis rock back. 7. Hold for 6 seconds while breathing smoothly. 8. Relax and allow your pelvis and hips to rock forward. 9. Repeat 8 to 12 times. Back stretches 5. Get down on your hands and knees on the floor. 6. Relax your head and allow it to droop. Round your back up toward the ceiling until you feel a nice stretch in your upper, middle, and lower back. Hold this stretch for as long as it feels comfortable, or about 15 to 30 seconds. 7. Return to the starting position with a flat back while you are on your hands and knees. 8. Let your back sway by pressing your stomach toward the floor. Lift your buttocks toward the ceiling. 9. Hold this position for 15 to 30 seconds. 10. Repeat 2 to 4 times. Follow-up care is a key part of your treatment and safety. Be sure to make and go to all appointments, and call your doctor if you are having problems. It's also a good idea to know your test results and keep a list of the medicines you take. Where can you learn more? Go to http://tramaine-alis.info/. Enter I113 in the search box to learn more about \"Low Back Arthritis: Exercises. \" Current as of: March 21, 2017 Content Version: 11.3 © 8069-3682 [employer]. Care instructions adapted under license by [employer] (which disclaims liability or warranty for this information). If you have questions about a medical condition or this instruction, always ask your healthcare professional. [address] any warranty or liability for your use of this information. Introducing [facility]! Beth Coreas introduces [facility] patient portal. Now you can access parts of your medical record, email your doctor's office, and request medication refills online. 1. In your internet browser, go to [email or web address] 2. Click on the First Time User? Click Here link in the Sign In box. You will see the New Member Sign Up page. 3. Enter your [facility] Access Code exactly as it appears below. You will not need to use this code after youve completed the sign-up process. If you do not sign up before the expiration date, you must request a new code. · [facility] Access Code: [email or web address] Expires: 10/9/2017 10:35 AM 
 
4. Enter the last four digits of your Social Security Number (xxxx) and Date of Birth (mm/dd/yyyy) as indicated and click Submit. You will be taken to the next sign-up page. 5. Create a [facility] ID. This will be your [facility] login ID and cannot be changed, so think of one that is secure and easy to remember. 6. Create a [facility] password. You can change your password at any time. 7. Enter your Password Reset Question and Answer. This can be used at a later time if you forget your password. 8. Enter your e-mail address. You will receive e-mail notification when new information is available in [address] 9. Click Sign Up. You can now view and download portions of your medical record. 10. Click the Download Summary menu link to download a portable copy of your medical information. If you have questions, please visit the Frequently Asked Questions section of the [facility] website. Remember, [facility] is NOT to be used for urgent needs. For medical emergencies, dial 911. Now available from your iPhone and Android! Please provide this summary of care documentation to your next provider. Your primary care clinician is listed as Jose Ramey. If you have any questions after today's visit, please call [phone].

## 2017-09-11 NOTE — PATIENT INSTRUCTIONS
[email or web address]    Search YouTube for my channel:    Dr. Gwen Zaragoza back/Piriformis               Neck Arthritis: Exercises  Your Care Instructions  Here are some examples of typical rehabilitation exercises for your condition. Start each exercise slowly. Ease off the exercise if you start to have pain. Your doctor or physical therapist will tell you when you can start these exercises and which ones will work best for you. How to do the exercises  Neck stretches to the side    1. This stretch works best if you keep your shoulder down as you lean away from it. To help you remember to do this, start by relaxing your shoulders and lightly holding on to your thighs or your chair. 2. Tilt your head toward your shoulder and hold for 15 to 30 seconds. Let the weight of your head stretch your muscles. 3. Repeat 2 to 4 times toward each shoulder. Chin tuck    1. Lie on the floor with a rolled-up towel under your neck. Your head should be touching the floor. 2. Slowly bring your chin toward your chest.  3. Hold for a count of 6, and then relax for up to 10 seconds. 4. Repeat 8 to 12 times. Active cervical rotation    1. Sit in a firm chair, or stand up straight. 2. Keeping your chin level, turn your head to the right, and hold for 15 to 30 seconds. 3. Turn your head to the left and hold for 15 to 30 seconds. 4. Repeat 2 to 4 times to each side. Shoulder blade squeeze    1. While standing, squeeze your shoulder blades together. 2. Do not raise your shoulders up as you are squeezing. 3. Hold for 6 seconds. 4. Repeat 8 to 12 times. Shoulder rolls    1. Sit comfortably with your feet shoulder-width apart. You can also do this exercise standing up. 2. Roll your shoulders up, then back, and then down in a smooth, circular motion. 3. Repeat 2 to 4 times. Follow-up care is a key part of your treatment and safety.  Be sure to make and go to all appointments, and call your doctor if you are having problems. It's also a good idea to know your test results and keep a list of the medicines you take. Where can you learn more? Go to http://tramaine-alis.info/. Enter W993 in the search box to learn more about \"Neck Arthritis: Exercises. \"  Current as of: March 21, 2017  Content Version: 11.3  © 2020-2188 [employer]. Care instructions adapted under license by [employer] (which disclaims liability or warranty for this information). If you have questions about a medical condition or this instruction, always ask your healthcare professional. [address] any warranty or liability for your use of this information. Low Back Arthritis: Exercises  Your Care Instructions  Here are some examples of typical rehabilitation exercises for your condition. Start each exercise slowly. Ease off the exercise if you start to have pain. Your doctor or physical therapist will tell you when you can start these exercises and which ones will work best for you. When you are not being active, find a comfortable position for rest. Some people are comfortable on the floor or a medium-firm bed with a small pillow under their head and another under their knees. Some people prefer to lie on their side with a pillow between their knees. Don't stay in one position for too long. Take short walks (10 to 20 minutes) every 2 to 3 hours. Avoid slopes, hills, and stairs until you feel better. Walk only distances you can manage without pain, especially leg pain. How to do the exercises  Pelvic tilt    4. Lie on your back with your knees bent. 5. \"Brace\" your stomach--tighten your muscles by pulling in and imagining your belly button moving toward your spine. 6. Press your lower back into the floor. You should feel your hips and pelvis rock back. 7. Hold for 6 seconds while breathing smoothly.   8. Relax and allow your pelvis and hips to rock forward. 9. Repeat 8 to 12 times. Back stretches    5. Get down on your hands and knees on the floor. 6. Relax your head and allow it to droop. Round your back up toward the ceiling until you feel a nice stretch in your upper, middle, and lower back. Hold this stretch for as long as it feels comfortable, or about 15 to 30 seconds. 7. Return to the starting position with a flat back while you are on your hands and knees. 8. Let your back sway by pressing your stomach toward the floor. Lift your buttocks toward the ceiling. 9. Hold this position for 15 to 30 seconds. 10. Repeat 2 to 4 times. Follow-up care is a key part of your treatment and safety. Be sure to make and go to all appointments, and call your doctor if you are having problems. It's also a good idea to know your test results and keep a list of the medicines you take. Where can you learn more? Go to http://tramaine-alis.info/. Enter W118 in the search box to learn more about \"Low Back Arthritis: Exercises. \"  Current as of: March 21, 2017  Content Version: 11.3  © 3290-1835 [employer]. Care instructions adapted under license by [employer] (which disclaims liability or warranty for this information). If you have questions about a medical condition or this instruction, always ask your healthcare professional. [address] any warranty or liability for your use of this information.

## 2017-09-12 ENCOUNTER — OFFICE VISIT (OUTPATIENT)
Dept: UROLOGY | Age: 57
End: 2017-09-12

## 2017-09-12 VITALS
WEIGHT: 189 LBS | SYSTOLIC BLOOD PRESSURE: 152 MMHG | BODY MASS INDEX: 30.37 KG/M2 | OXYGEN SATURATION: 98 % | DIASTOLIC BLOOD PRESSURE: 89 MMHG | HEART RATE: 54 BPM | HEIGHT: 66 IN

## 2017-09-12 DIAGNOSIS — C67.9 MALIGNANT NEOPLASM OF URINARY BLADDER, UNSPECIFIED SITE (HCC): Primary | ICD-10-CM

## 2017-09-12 LAB
BILIRUB UR QL STRIP: NEGATIVE
GLUCOSE UR-MCNC: NEGATIVE MG/DL
KETONES P FAST UR STRIP-MCNC: NEGATIVE MG/DL
PH UR STRIP: 7 [PH] (ref 4.6–8)
PROT UR QL STRIP: NEGATIVE MG/DL
SP GR UR STRIP: 1.02 (ref 1–1.03)
UA UROBILINOGEN AMB POC: NORMAL (ref 0.2–1)
URINALYSIS CLARITY POC: CLEAR
URINALYSIS COLOR POC: YELLOW
URINE BLOOD POC: NEGATIVE
URINE LEUKOCYTES POC: NEGATIVE
URINE NITRITES POC: NEGATIVE

## 2017-09-12 NOTE — PATIENT INSTRUCTIONS
Cystoscopy: Care Instructions  Your Care Instructions  Cystoscopy is a test. It uses a thin, lighted tube called a cystoscope to see the inside of the bladder and the urethra. The urethra is the tube that carries urine out of the body. This test is helpful because it lets your doctor see areas of your bladder and urethra that are hard to see on X-rays. It can help your doctor find bladder stones, tumors, bleeding, and infection. During this test, your doctor also can take tissue and urine samples. And if your doctor finds small stones or growths, he or she can remove them. In most cases the scope is in the bladder for less than 10 minutes. But the entire test may take 45 minutes or longer. You will probably get local anesthesia. This numbs a small part of your body. Or you may get spinal anesthesia, which numbs more of your body. Once in a while, doctors use general anesthesia. It makes you sleep during surgery. If you get a local anesthetic, you may be able to get up right after the test. But if you had spinal or general anesthesia, you will stay in the recovery room until you are able to walk or you have feeling again in your lower body. This usually takes about an hour. Your doctor may be able to tell you some of the results right after the test. But the complete results may take several days. Follow-up care is a key part of your treatment and safety. Be sure to make and go to all appointments, and call your doctor if you are having problems. It's also a good idea to know your test results and keep a list of the medicines you take. How can you care for yourself at home? Before the test  · If you are having a local anesthetic, you can eat and drink before the test.  · If you are having a spinal or general anesthetic, do not eat or drink anything for at least 8 hours before the test. Tell your doctor what medicines you take.   · If you are not staying overnight in the hospital, make sure you have someone who can drive you home after the test.  After the test  · If your doctor prescribed antibiotics, take them as directed. Do not stop taking them just because you feel better. You need to take the full course of antibiotics. · You may have some burning when you urinate for a day or two after the test. You may feel better if you drink more fluids. This may also help prevent an infection. · Your urine may have a pinkish color for a few days after the test.  When should you call for help? Call your doctor now or seek immediate medical care if:  · Your urine is still red or you see blood clots after you have urinated several times. · You cannot pass urine 8 hours after the test.  · You get a fever or chills. · You have pain in your belly or your back just below your rib cage. This is also called flank pain. Watch closely for changes in your health, and be sure to contact your doctor if:  · You have pain or burning when you urinate. A burning sensation is normal for a day or two after the test. But call if it does not get better. · You have a frequent urge to urinate but can pass only small amounts of urine. · Your urine is pink, red, or cloudy or smells bad. It is normal for the urine to have a pinkish color for a few days after the test. But call if it does not get better. · You do not get better as expected. Where can you learn more? Go to http://tramaine-alis.info/. Enter I696 in the search box to learn more about \"Cystoscopy: Care Instructions. \"  Current as of: August 12, 2016  Content Version: 11.3  © 1971-6494 [employer]. Care instructions adapted under license by [employer] (which disclaims liability or warranty for this information). If you have questions about a medical condition or this instruction, always ask your healthcare professional. [address] any warranty or liability for your use of this information.

## 2017-09-12 NOTE — PROGRESS NOTES
The patient comes now for his first 3 month surveillance cystoscopy. He finished his 6 week BCG induction immunotherapy about a month ago. Cystoscopy is essentially negative. Wash cytology is done. Patient will be starting his first of the 3 month BCG maintenance treatments on 7 November    The patient is prepared for cystoscopy. He has been counseled regarding the risks which include, but are not limited to, infection, bleeding, injury, failure to diagnose, and possible need for additional procedures. He wishes to proceed, giving his informed consent. Procedure note: The patient is in the slightly inclined supine position where he is prepped and draped in sterile fashion. Lidocaine jelly has been inserted into the urethra and a suitable time has been permitted to elapse for establishment of anesthesia. A flexible cystoscope is inserted and video cystoscopy is carried out with the patient able to observe the monitor. The findings are as follows:  1. Pendulous urethra normal  2. Bulbar urethra normal  3. External sphincter normal  4. Prostate   5. Bladder neck  6. Trigone  7. Bladder wall all above structures are normal with the anticipated scar in the resection area. The operative site is washed for cytology    Pertinent observations are pointed out to the patient and all the patient's questions are entertained. The procedure is terminated . Full discussion regarding the cystoscopic findings will be carried out. Following removal of the cystoscope, the patient is left in position for a suitable period of time and allowed to dress. Post procedure instructions are given. The patient tolerated the procedure well.

## 2017-09-12 NOTE — MR AVS SNAPSHOT
Visit Information Date & Time Provider Department Dept. Phone Encounter #  
 9/12/2017  9:15 AM Shaniqua [address]  [facility] [id number] Follow-up Instructions Return in about 7 weeks (around 10/31/2017) for for first of 3 BCG. Follow-up and Disposition History Your Appointments 11/7/2017 10:00 AM  
PROCEDURE with Lissette Esparza MD  
[facility]) Appt Note: BCG #1  
 [address] [place] [address] [address]  
[phone] [phone]  
  
    
 11/14/2017 10:15 AM  
PROCEDURE with Robel Hope MD  
[facility]) Appt Note: BCG #2  
 [address] [place] [address] [address]  
[phone] [phone]  
  
    
 11/21/2017  9:15 AM  
PROCEDURE with Robel Hope MD  
[facility]) Appt Note: BCG #3  
 [address] [place] [address] [address]  
[phone] Upcoming Health Maintenance Date Due Hepatitis C Screening 1960 Pneumococcal 19-64 Highest Risk (1 of 3 - PCV13) 5/4/1981 DTaP/Tdap/Td series (1 - Tdap) 5/4/1983 FOBT Q 1 YEAR AGE 50-75 5/4/2012 INFLUENZA AGE 9 TO ADULT 8/1/2017 Allergies as of 9/12/2017  Review Complete On: 9/12/2017 By: Lissette Esparza MD  
 No Known Allergies Current Immunizations  Never Reviewed No immunizations on file. Not reviewed this visit You Were Diagnosed With   
  
 Codes Comments Malignant neoplasm of urinary bladder, unspecified site [facility])    -  Primary ICD-10-CM: C67.9 ICD-9-CM: 188. 9 Vitals BP Pulse Height(growth percentile) Weight(growth percentile) SpO2 BMI  
 152/89 (BP 1 Location: Left arm, BP Patient Position: Sitting) (!) 54 5' 6\" (1.676 m) 189 lb (85.7 kg) 98% 30.51 kg/m2 Smoking Status Former Smoker Vitals History BMI and BSA Data Body Mass Index Body Surface Area 30.51 kg/m 2 2 m 2 Preferred Pharmacy Pharmacy Name Phone [address], [facility] [phone] Your Updated Medication List  
  
   
This list is accurate as of: 9/12/17  3:20 PM.  Always use your most recent med list.  
  
  
  
  
 gabapentin 800 mg tablet Commonly known as:  NEURONTIN Take 800 mg by mouth three (3) times daily. HYDROcodone-acetaminophen 7.5-325 mg per tablet Commonly known as:  Fredrica Richter Take 1 Tab by mouth every eight (8) hours as needed for Pain for up to 7 days. Max Daily Amount: 3 Tabs. losartan 100 mg tablet Commonly known as:  COZAAR Take 100 mg by mouth daily. meloxicam 15 mg tablet Commonly known as:  MOBIC Take 1 tab daily or 1/2 tab twice daily as needed pain with food. methocarbamol 750 mg tablet Commonly known as:  ROBAXIN Take 1 Tab by mouth four (4) times daily as needed. Take as needed for muscle spasms  
  
 nicotine 14 mg/24 hr patch Commonly known as:  NICODERM CQ  
APPLY 1 PATCH TOPICALLY EVERY 24 HOURS FOR 6 WEEKS We Performed the Following AMB POC URINALYSIS DIP STICK AUTO W/O MICRO [38207 CPT(R)] CYSTOSCOPY [72747 CPT(R)] CYTOLOGY NON-GYN [id number] CPT(R)] Follow-up Instructions Return in about 7 weeks (around 10/31/2017) for for first of 3 BCG. Patient Instructions Cystoscopy: Care Instructions Your Care Instructions Cystoscopy is a test. It uses a thin, lighted tube called a cystoscope to see the inside of the bladder and the urethra. The urethra is the tube that carries urine out of the body. This test is helpful because it lets your doctor see areas of your bladder and urethra that are hard to see on X-rays.  It can help your doctor find bladder stones, tumors, bleeding, and infection. During this test, your doctor also can take tissue and urine samples. And if your doctor finds small stones or growths, he or she can remove them. In most cases the scope is in the bladder for less than 10 minutes. But the entire test may take 45 minutes or longer. You will probably get local anesthesia. This numbs a small part of your body. Or you may get spinal anesthesia, which numbs more of your body. Once in a while, doctors use general anesthesia. It makes you sleep during surgery. If you get a local anesthetic, you may be able to get up right after the test. But if you had spinal or general anesthesia, you will stay in the recovery room until you are able to walk or you have feeling again in your lower body. This usually takes about an hour. Your doctor may be able to tell you some of the results right after the test. But the complete results may take several days. Follow-up care is a key part of your treatment and safety. Be sure to make and go to all appointments, and call your doctor if you are having problems. It's also a good idea to know your test results and keep a list of the medicines you take. How can you care for yourself at home? Before the test 
· If you are having a local anesthetic, you can eat and drink before the test. 
· If you are having a spinal or general anesthetic, do not eat or drink anything for at least 8 hours before the test. Tell your doctor what medicines you take. · If you are not staying overnight in the hospital, make sure you have someone who can drive you home after the test. 
After the test 
· If your doctor prescribed antibiotics, take them as directed. Do not stop taking them just because you feel better. You need to take the full course of antibiotics. · You may have some burning when you urinate for a day or two after the test. You may feel better if you drink more fluids. This may also help prevent an infection. · Your urine may have a pinkish color for a few days after the test. 
When should you call for help? Call your doctor now or seek immediate medical care if: 
· Your urine is still red or you see blood clots after you have urinated several times. · You cannot pass urine 8 hours after the test. 
· You get a fever or chills. · You have pain in your belly or your back just below your rib cage. This is also called flank pain. Watch closely for changes in your health, and be sure to contact your doctor if: 
· You have pain or burning when you urinate. A burning sensation is normal for a day or two after the test. But call if it does not get better. · You have a frequent urge to urinate but can pass only small amounts of urine. · Your urine is pink, red, or cloudy or smells bad. It is normal for the urine to have a pinkish color for a few days after the test. But call if it does not get better. · You do not get better as expected. Where can you learn more? Go to http://tramaine-alis.info/. Enter R745 in the search box to learn more about \"Cystoscopy: Care Instructions. \" Current as of: August 12, 2016 Content Version: 11.3 © 0079-1738 [employer]. Care instructions adapted under license by [employer] (which disclaims liability or warranty for this information). If you have questions about a medical condition or this instruction, always ask your healthcare professional. [place] [address] any warranty or liability for your use of this information. Patient Instructions History Introducing [facility]! Kee Méndez introduces [employer] patient portal. Now you can access parts of your medical record, email your doctor's office, and request medication refills online. 1. In your internet browser, go to [email or web address] 2. Click on the First Time User? Click Here link in the Sign In box.  You will see the New Member Sign Up page. 3. Enter your [facility] Access Code exactly as it appears below. You will not need to use this code after youve completed the sign-up process. If you do not sign up before the expiration date, you must request a new code. · [facility] Access Code: [email or web address] Expires: 10/9/2017 10:35 AM 
 
4. Enter the last four digits of your Social Security Number (xxxx) and Date of Birth (mm/dd/yyyy) as indicated and click Submit. You will be taken to the next sign-up page. 5. Create a [facility] ID. This will be your [facility] login ID and cannot be changed, so think of one that is secure and easy to remember. 6. Create a [facility] password. You can change your password at any time. 7. Enter your Password Reset Question and Answer. This can be used at a later time if you forget your password. 8. Enter your e-mail address. You will receive e-mail notification when new information is available in [address]. 9. Click Sign Up. You can now view and download portions of your medical record. 10. Click the Download Summary menu link to download a portable copy of your medical information. If you have questions, please visit the Frequently Asked Questions section of the [facility] website. Remember, [facility] is NOT to be used for urgent needs. For medical emergencies, dial 911. Now available from your iPhone and Android! Please provide this summary of care documentation to your next provider. Your primary care clinician is listed as Tesfaye Kennedy. If you have any questions after today's visit, please call [phone].

## 2017-09-12 NOTE — PROGRESS NOTES
Mr. Luisa Lyamn has a reminder for a \"due or due soon\" health maintenance. I have asked that he contact his primary care provider for follow-up on this health maintenance. [facility]  OFFICE PROCEDURE PROGRESS NOTE        Chart reviewed for the following:   IDex LPN, have reviewed the History, Physical and updated the Allergic reactions for [place] performed immediately prior to start of procedure:   Kristina Rojo LPN, have performed the following reviews on Yanique Bullock prior to the start of the procedure:            * Patient was identified by name and date of birth   * Agreement on procedure being performed was verified  * Risks and Benefits explained to the patient  * Procedure site verified and marked as necessary  * Patient was positioned for comfort  * Consent was signed and verified     Time: 0908      Date of procedure: 9/12/2017    Procedure performed by:  Benedicto Aldridge MD    Provider assisted by: Leslee Garcia LPN    Patient assisted by: self    How tolerated by patient: tolerated the procedure well with no complications    Post Procedural Pain Scale: 0 - No Hurt    Comments: none    Patient verbalized understanding of procedure and post procedure instructions.

## 2017-09-13 LAB
DX ICD CODE: NORMAL
PATH REPORT.FINAL DX SPEC: NORMAL
PATH REPORT.GROSS SPEC: NORMAL
PATH REPORT.SITE OF ORIGIN SPEC: NORMAL
PATHOLOGIST NAME: NORMAL
PERFORMED BY, 191120: NORMAL

## 2017-11-07 ENCOUNTER — OFFICE VISIT (OUTPATIENT)
Dept: UROLOGY | Age: 57
End: 2017-11-07

## 2017-11-07 VITALS
HEIGHT: 66 IN | HEART RATE: 56 BPM | DIASTOLIC BLOOD PRESSURE: 89 MMHG | OXYGEN SATURATION: 99 % | WEIGHT: 190 LBS | BODY MASS INDEX: 30.53 KG/M2 | SYSTOLIC BLOOD PRESSURE: 148 MMHG

## 2017-11-07 DIAGNOSIS — C67.9 MALIGNANT NEOPLASM OF URINARY BLADDER, UNSPECIFIED SITE (HCC): Primary | ICD-10-CM

## 2017-11-07 LAB
BILIRUB UR QL STRIP: NEGATIVE
GLUCOSE UR-MCNC: NEGATIVE MG/DL
KETONES P FAST UR STRIP-MCNC: NEGATIVE MG/DL
PH UR STRIP: 7 [PH] (ref 4.6–8)
PROT UR QL STRIP: NEGATIVE
SP GR UR STRIP: 1.01 (ref 1–1.03)
UA UROBILINOGEN AMB POC: NORMAL (ref 0.2–1)
URINALYSIS CLARITY POC: CLEAR
URINALYSIS COLOR POC: YELLOW
URINE BLOOD POC: NEGATIVE
URINE LEUKOCYTES POC: NEGATIVE
URINE NITRITES POC: NEGATIVE

## 2017-11-07 NOTE — MR AVS SNAPSHOT
Visit Information Date & Time Provider Department Dept. Phone Encounter #  
 11/7/2017 10:00 AM Felipa Angela, [address] [place] [address] [facility] [phone] Follow-up Instructions Return in about 1 week (around 11/14/2017) for BCG #2. Your Appointments 11/14/2017 10:15 AM  
PROCEDURE with Genie Sanford MD  
[facility] [address] [place] [address]) Appt Note: BCG #2  
 [address] [facility] [address] [address]  
[phone] [phone]  
  
    
 11/21/2017  9:15 AM  
PROCEDURE with Genie Sanford MD  
[facility]) Appt Note: BCG #3  
 [address] [facility] [address] [address]  
[phone] Upcoming Health Maintenance Date Due Hepatitis C Screening 1960 Pneumococcal 19-64 Highest Risk (1 of 3 - PCV13) 5/4/1981 DTaP/Tdap/Td series (1 - Tdap) 5/4/1983 FOBT Q 1 YEAR AGE 50-75 5/4/2012 INFLUENZA AGE 9 TO ADULT 8/1/2017 Allergies as of 11/7/2017  Review Complete On: 11/7/2017 By: Felipa Angela MD  
 No Known Allergies Current Immunizations  Never Reviewed No immunizations on file. Not reviewed this visit You Were Diagnosed With   
  
 Codes Comments Malignant neoplasm of urinary bladder, unspecified site [facility])    -  Primary ICD-10-CM: C67.9 ICD-9-CM: 188. 9 Vitals BP Pulse Height(growth percentile) Weight(growth percentile) SpO2 BMI  
 148/89 (BP 1 Location: Left arm, BP Patient Position: Sitting) (!) 56 5' 6\" (1.676 m) 190 lb (86.2 kg) 99% 30.67 kg/m2 Smoking Status Former Smoker Vitals History BMI and BSA Data Body Mass Index Body Surface Area  
 30.67 kg/m 2 2 m 2 Preferred Pharmacy Pharmacy Name Phone [address], [address] [place] [address] [phone] Your Updated Medication List  
  
   
This list is accurate as of: 11/7/17 11:06 AM.  Always use your most recent med list.  
  
  
  
  
 BCG live 50 mg injection Commonly known as:  LYNN 50 mg by IntraVESical route once for 1 dose. gabapentin 800 mg tablet Commonly known as:  NEURONTIN Take 800 mg by mouth three (3) times daily. losartan 100 mg tablet Commonly known as:  COZAAR Take 100 mg by mouth daily. meloxicam 15 mg tablet Commonly known as:  MOBIC Take 1 tab daily or 1/2 tab twice daily as needed pain with food. methocarbamol 750 mg tablet Commonly known as:  ROBAXIN Take 1 Tab by mouth four (4) times daily as needed. Take as needed for muscle spasms  
  
 nicotine 14 mg/24 hr patch Commonly known as:  NICODERM CQ  
APPLY 1 PATCH TOPICALLY EVERY 24 HOURS FOR 6 WEEKS We Performed the Following AMB POC URINALYSIS DIP STICK AUTO W/O MICRO [29460 CPT(R)] BCG LIVE INTRAVESICAL VAC [ [facility]] [place] INSTILL ANTICANCER AGENT IN BLADDER [id number] CPT(R)] Follow-up Instructions Return in about 1 week (around 11/14/2017) for BCG #2. Patient Instructions Bacillus of [address] (Inside the bladder) Bacillus of [place] (ba-SILL-us of Vasu-met and SHELL-in) Treats or prevents bladder cancer, and used to prevent certain bladder tumors. Also called Bacillus Calmette-Monica (or BCG). Brand Name(s): Theracys, Lynn BCG There may be other brand names for this medicine. When This Medicine Should Not Be Used: You should not use this medicine if your immune system is weak due to other medicines or illness. You should not use this medicine if you have a fever (unless your doctor knows the cause) or if you have a bladder infection. You may not be able to receive this medicine if you have active tuberculosis, or blood in your urine. How to Use This Medicine:  
Liquid · Do not drink fluids for 4 hours before your treatment. Empty your bladder before you receive this medicine. · This medicine is given by running the liquid through a tube (catheter) into your bladder. The tube is then taken out, and you will hold the medicine in your bladder for two hours. · While this medicine is in the bladder, you should lie on your back, stomach, and each side for 15 minutes in each position. · You can get up after the first hour, but hold the medicine in your bladder for 1 more hour. If you are unable to hold it, tell your doctor or nurse. · Treatment with BCG may be given once a week for 6 weeks and approximately each month afterward for 6 to 12 months, or as your doctor orders. · Drink plenty of water during each treatment. · After each treatment, all patients (men and women) should sit down to urinate. · Any time you urinate in the first 6 hours after treatment, disinfect the urine by adding an equal amount (usually about 8 ounces, or 1 cup) of household bleach to the toilet bowl and letting it stand for 15 minutes before flushing. · A nurse or other health provider will give you this medicine. Drugs and Foods to Avoid: Ask your doctor or pharmacist before using any other medicine, including over-the-counter medicines, vitamins, and herbal products. · Make sure your doctor knows if you are also using a medicine to treat an infection or tuberculosis. Some tuberculosis medicines are ethambutol, isoniazid, and rifampin. Medicines to treat an infection (antibiotics) include amoxicillin, azithromycin, penicillin, Augmentin®, Bactrim®, and Cipro®. · Tell your doctor if you are using any medicine that weakens your immune system, such as steroids or cancer treatments. Some steroids are dexamethasone, prednisolone, prednisone, and Medrol®. Warnings While Using This Medicine: · Before you begin your treatment, make sure your doctor knows if you are pregnant or breast feeding. Do not get pregnant while you are being treated with this medicine. · Tell your doctor if you have any changes in urinary habits after treatment with this medicine. Changes include urinating more or less than usual or having trouble urinating. · Some people develop infections after receiving this medicine. Call your doctor right away if you start to have flu-like symptoms. These symptoms include chills, weakness, and fever (103 degrees or higher) that lasts longer than 72 hours. · Your doctor will do lab tests at regular visits to check on the effects of this medicine. Keep all appointments. Possible Side Effects While Using This Medicine:  
Call your doctor right away if you notice any of these side effects: · Blood in your urine. · Burning or pain when you urinate. · Fever (103 degrees F or higher), chills, or extreme tiredness. · Severe skin rash. · Uncontrollable shaking or trembling. · Urinating more than usual, or feeling like you have to urinate more often. If you notice these less serious side effects, talk with your doctor: · Cough. · Mild skin rash. · Nausea or vomiting. · Pain in your joints. If you notice other side effects that you think are caused by this medicine, tell your doctor. Call your doctor for medical advice about side effects. You may report side effects to FDA at 0-546-FDA-8431 © 2017 [address] Information is for End User's use only and may not be sold, redistributed or otherwise used for commercial purposes. The above information is an  only. It is not intended as medical advice for individual conditions or treatments. Talk to your doctor, nurse or pharmacist before following any medical regimen to see if it is safe and effective for you. Introducing [facility] [place] [facility]!    
 [facility] introduces [employer] patient portal. Now you can access parts of your medical record, email your doctor's office, and request medication refills online. 1. In your internet browser, go to [email or web address] 2. Click on the First Time User? Click Here link in the Sign In box. You will see the New Member Sign Up page. 3. Enter your [employer] Access Code exactly as it appears below. You will not need to use this code after youve completed the sign-up process. If you do not sign up before the expiration date, you must request a new code. · [employer] Access Code: [email or web address] Expires: 2/5/2018 11:06 AM 
 
4. Enter the last four digits of your Social Security Number (xxxx) and Date of Birth (mm/dd/yyyy) as indicated and click Submit. You will be taken to the next sign-up page. 5. Create a Credit Karma ID. This will be your Credit Karma login ID and cannot be changed, so think of one that is secure and easy to remember. 6. Create a [employer] password. You can change your password at any time. 7. Enter your Password Reset Question and Answer. This can be used at a later time if you forget your password. 8. Enter your e-mail address. You will receive e-mail notification when new information is available in [address] 9. Click Sign Up. You can now view and download portions of your medical record. 10. Click the Download Summary menu link to download a portable copy of your medical information. If you have questions, please visit the Frequently Asked Questions section of the [employer] website. Remember, [employer] is NOT to be used for urgent needs. For medical emergencies, dial 911. Now available from your iPhone and Android! Please provide this summary of care documentation to your next provider. Your primary care clinician is listed as Anna Menjivar. If you have any questions after today's visit, please call [phone].

## 2017-11-07 NOTE — PROGRESS NOTES
Mr. Estefania Ennis has a reminder for a \"due or due soon\" health maintenance. I have asked that he contact his primary care provider for follow-up on this health maintenance. [facility]  OFFICE PROCEDURE PROGRESS NOTE        Chart reviewed for the following:   INina LPN, have reviewed the History, Physical and updated the Allergic reactions for [place] performed immediately prior to start of procedure:   Mynor Buchanan LPN, have performed the following reviews on Leticia Carrasquillo prior to the start of the procedure:            * Patient was identified by name and date of birth   * Agreement on procedure being performed was verified  * Risks and Benefits explained to the patient  * Procedure site verified and marked as necessary  * Patient was positioned for comfort  * Consent was signed and verified     Time: 10:21AM    Date of procedure: 11/7/2017    Procedure performed by:  Toño Xiao MD    Provider assisted by: Harjinder Nelson LPN    Patient assisted by: self    How tolerated by patient: tolerated the procedure well with no complications    Post Procedural Pain Scale: 0 - No Hurt    Comments: none    Patient verbalized understanding of procedure and post procedure instructions.

## 2017-11-07 NOTE — PATIENT INSTRUCTIONS
Bacillus of [address] (Inside the bladder)   Bacillus of [place] (ba-SILL-us of Vasu-met and SHELL-in)  Treats or prevents bladder cancer, and used to prevent certain bladder tumors. Also called Bacillus Calmette-Monica (or BCG). Brand Name(s): Thergeovany Lynn BCG   There may be other brand names for this medicine. When This Medicine Should Not Be Used: You should not use this medicine if your immune system is weak due to other medicines or illness. You should not use this medicine if you have a fever (unless your doctor knows the cause) or if you have a bladder infection. You may not be able to receive this medicine if you have active tuberculosis, or blood in your urine. How to Use This Medicine:   Liquid  · Do not drink fluids for 4 hours before your treatment. Empty your bladder before you receive this medicine. · This medicine is given by running the liquid through a tube (catheter) into your bladder. The tube is then taken out, and you will hold the medicine in your bladder for two hours. · While this medicine is in the bladder, you should lie on your back, stomach, and each side for 15 minutes in each position. · You can get up after the first hour, but hold the medicine in your bladder for 1 more hour. If you are unable to hold it, tell your doctor or nurse. · Treatment with BCG may be given once a week for 6 weeks and approximately each month afterward for 6 to 12 months, or as your doctor orders. · Drink plenty of water during each treatment. · After each treatment, all patients (men and women) should sit down to urinate. · Any time you urinate in the first 6 hours after treatment, disinfect the urine by adding an equal amount (usually about 8 ounces, or 1 cup) of household bleach to the toilet bowl and letting it stand for 15 minutes before flushing. · A nurse or other health provider will give you this medicine.   Drugs and Foods to Avoid:   Ask your doctor or pharmacist before using any other medicine, including over-the-counter medicines, vitamins, and herbal products. · Make sure your doctor knows if you are also using a medicine to treat an infection or tuberculosis. Some tuberculosis medicines are ethambutol, isoniazid, and rifampin. Medicines to treat an infection (antibiotics) include amoxicillin, azithromycin, penicillin, Augmentin®, Bactrim®, and Cipro®. · Tell your doctor if you are using any medicine that weakens your immune system, such as steroids or cancer treatments. Some steroids are dexamethasone, prednisolone, prednisone, and Medrol®. Warnings While Using This Medicine:   · Before you begin your treatment, make sure your doctor knows if you are pregnant or breast feeding. Do not get pregnant while you are being treated with this medicine. · Tell your doctor if you have any changes in urinary habits after treatment with this medicine. Changes include urinating more or less than usual or having trouble urinating. · Some people develop infections after receiving this medicine. Call your doctor right away if you start to have flu-like symptoms. These symptoms include chills, weakness, and fever (103 degrees or higher) that lasts longer than 72 hours. · Your doctor will do lab tests at regular visits to check on the effects of this medicine. Keep all appointments. Possible Side Effects While Using This Medicine:   Call your doctor right away if you notice any of these side effects:  · Blood in your urine. · Burning or pain when you urinate. · Fever (103 degrees F or higher), chills, or extreme tiredness. · Severe skin rash. · Uncontrollable shaking or trembling. · Urinating more than usual, or feeling like you have to urinate more often. If you notice these less serious side effects, talk with your doctor:   · Cough. · Mild skin rash. · Nausea or vomiting. · Pain in your joints.   If you notice other side effects that you think are caused by this medicine, tell your doctor. Call your doctor for medical advice about side effects. You may report side effects to FDA at 0-154-SEN-5743  © 2017 [address] Information is for End User's use only and may not be sold, redistributed or otherwise used for commercial purposes. The above information is an  only. It is not intended as medical advice for individual conditions or treatments. Talk to your doctor, nurse or pharmacist before following any medical regimen to see if it is safe and effective for you.

## 2017-11-07 NOTE — PROGRESS NOTES
Patient comes in and receives the first in a series of 3 BCG treatments that will constitute the first of his 3 month series of maintenance BCG treatments. The patient had his 3 month surveillance cystoscopy about 2 months ago and would be due to have that 3 month surveillance cystoscopy done about a week following the BCG treatment base    The patient was placed supine. The penis is prepped. A tip catheter is passed and excess urine is drained. The patient receives his prepared BCG mixture and will follow the treatment protocol    I have again reviewed with the patient the protocol for surveillance cystoscopy and the rationale behind the maintenance BCG treatments      This visit exceeded 10 minutes and greater than 50% was counseling. The patient expresses understanding of the treatment plan and wishes to proceed    This dictation used voice recognition software and there may be mistakes.     Freda Dennis MD

## 2017-11-14 ENCOUNTER — OFFICE VISIT (OUTPATIENT)
Dept: UROLOGY | Age: 57
End: 2017-11-14

## 2017-11-14 VITALS
DIASTOLIC BLOOD PRESSURE: 80 MMHG | SYSTOLIC BLOOD PRESSURE: 120 MMHG | BODY MASS INDEX: 30.37 KG/M2 | HEIGHT: 66 IN | WEIGHT: 189 LBS | OXYGEN SATURATION: 97 % | HEART RATE: 67 BPM

## 2017-11-14 DIAGNOSIS — C67.9 MALIGNANT NEOPLASM OF URINARY BLADDER, UNSPECIFIED SITE (HCC): Primary | ICD-10-CM

## 2017-11-14 NOTE — PATIENT INSTRUCTIONS
Bladder Cancer: Care Instructions  Your Care Instructions    Bladder cancer occurs when abnormal cells grow out of control in the bladder. It usually can be cured when it is found early. It is more common in older people. Treatment may include surgery to remove part of the bladder. If the tumor is large, the entire bladder may be removed. You may also have radiation or chemotherapy to kill the cancer cells. Sometimes people get treatment with medicines that help the body's natural defenses, or immune system, fight the cancer. Finding out that you have cancer is scary. You may feel many emotions and may need some help coping. Seek out family, friends, and counselors for support. You also can do things at home to make yourself feel better while you go through treatment. Call the [employer]Place (5-394.272.8443) or visit its website at [employer] for more information. Follow-up care is a key part of your treatment and safety. Be sure to make and go to all appointments, and call your doctor if you are having problems. It's also a good idea to know your test results and keep a list of the medicines you take. How can you care for yourself at home? · Take your medicines exactly as prescribed. Call your doctor if you think you are having a problem with your medicine. You may get medicine for nausea and vomiting if you have these side effects. · Eat healthy food. If you are not hungry, try to eat food that has protein and extra calories to keep up your strength and prevent weight loss. Drink liquid meal replacements for extra calories and protein. Try to eat your main meal early. · Get some physical activity every day, but do not get too tired. Keep doing the hobbies you enjoy as your energy allows. · Take steps to control your stress and workload. Learn relaxation techniques. ¨ Share your feelings. Stress and tension affect our emotions.  By expressing your feelings to others, you may be able to understand and cope with them. ¨ Consider joining a support group. Talking about a problem with your spouse, a good friend, or other people with similar problems is a good way to reduce tension and stress. ¨ Express yourself through art. Try writing, dance, art, or crafts to relieve tension. Some dance, writing, or art groups may be available just for people who have cancer. ¨ Be kind to your body and mind. Getting enough sleep, eating a healthy diet, and taking time to do things you enjoy can contribute to an overall feeling of balance in your life and help reduce stress. ¨ Get help if you need it. Discuss your concerns with your doctor or counselor. · If you are vomiting or have diarrhea:  ¨ Drink plenty of fluids (enough so that your urine is light yellow or clear like water) to prevent dehydration. Choose water and other caffeine-free clear liquids. If you have kidney, heart, or liver disease and have to limit fluids, talk with your doctor before you increase the amount of fluids you drink. ¨ When you are able to eat, try clear soups, mild foods, and liquids until all symptoms are gone for 12 to 48 hours. Other good choices include dry toast, crackers, cooked cereal, and gelatin dessert, such as Jell-O.  · Take care of your urinary tract to prevent problems such as infection, which can be caused by bladder cancer and its treatment. Limit drinks with caffeine, drink plenty of fluids, and urinate every 3 to 4 hours. · If you have not already done so, prepare a list of advance directives. Advance directives are instructions to your doctor and family members about what kind of care you want if you become unable to speak for yourself. When should you call for help? Call your doctor now or seek immediate medical care if:  ? · You have pain that does not get better after taking pain medicine. ? · You have symptoms of a kidney infection. These may include:  ¨ Pain or burning when you urinate.   ¨ A frequent need to urinate without being able to pass much urine. ¨ Pain in the flank, which is just below the rib cage and above the waist on either side of the back. ¨ Blood in your urine. ¨ A fever. ? Watch closely for changes in your health, and be sure to contact your doctor if:  ? · You do not get better as expected. Where can you learn more? Go to http://tramaine-alis.info/. Enter M796 in the search box to learn more about \"Bladder Cancer: Care Instructions. \"  Current as of: May 12, 2017  Content Version: 11.4  © 5857-5546 [employer]. Care instructions adapted under license by [employer] (which disclaims liability or warranty for this information). If you have questions about a medical condition or this instruction, always ask your healthcare professional. [address] any warranty or liability for your use of this information.

## 2017-11-14 NOTE — MR AVS SNAPSHOT
Visit Information Date & Time Provider Department Dept. Phone Encounter #  
 11/14/2017 10:15 AM Wojciech Grey, [facility] [phone] [id number] Your Appointments 11/21/2017  9:15 AM  
PROCEDURE with Wojciech Grey MD  
[facility]) Appt Note: BCG #3  
 [address] [place] [address] [address]  
[phone] [address] [facility] [address] Upcoming Health Maintenance Date Due Hepatitis C Screening 1960 Pneumococcal 19-64 Highest Risk (1 of 3 - PCV13) 5/4/1981 DTaP/Tdap/Td series (1 - Tdap) 5/4/1983 FOBT Q 1 YEAR AGE 50-75 5/4/2012 Influenza Age 5 to Adult 8/1/2017 Allergies as of 11/14/2017  Review Complete On: 11/7/2017 By: Calli Neville MD  
 No Known Allergies Current Immunizations  Never Reviewed No immunizations on file. Not reviewed this visit You Were Diagnosed With   
  
 Codes Comments Malignant neoplasm of urinary bladder, unspecified site [facility])    -  Primary ICD-10-CM: C67.9 ICD-9-CM: 188. 9 Vitals BP Pulse Height(growth percentile) Weight(growth percentile) SpO2 BMI  
 120/80 (BP 1 Location: Left arm, BP Patient Position: Sitting) 67 5' 6\" (1.676 m) 189 lb (85.7 kg) 97% 30.51 kg/m2 Smoking Status Former Smoker Vitals History BMI and BSA Data Body Mass Index Body Surface Area 30.51 kg/m 2 2 m 2 Preferred Pharmacy Pharmacy Name Phone [address], [address] [place] [address] [phone] Your Updated Medication List  
  
   
This list is accurate as of: 11/14/17 10:44 AM.  Always use your most recent med list.  
  
  
  
  
 gabapentin 800 mg tablet Commonly known as:  NEURONTIN Take 800 mg by mouth three (3) times daily. losartan 100 mg tablet Commonly known as:  COZAAR  
 Take 100 mg by mouth daily. meloxicam 15 mg tablet Commonly known as:  MOBIC Take 1 tab daily or 1/2 tab twice daily as needed pain with food. methocarbamol 750 mg tablet Commonly known as:  ROBAXIN Take 1 Tab by mouth four (4) times daily as needed. Take as needed for muscle spasms  
  
 nicotine 14 mg/24 hr patch Commonly known as:  NICODERM CQ  
APPLY 1 PATCH TOPICALLY EVERY 24 HOURS FOR 6 WEEKS  
  
 ANA BCG 50 mg injection Generic drug:  BCG live 50 mg by IntraVESical route once. We Performed the Following AMB POC URINALYSIS DIP STICK AUTO W/O MICRO [24705 CPT(R)] Patient Instructions Bladder Cancer: Care Instructions Your Care Instructions Bladder cancer occurs when abnormal cells grow out of control in the bladder. It usually can be cured when it is found early. It is more common in older people. Treatment may include surgery to remove part of the bladder. If the tumor is large, the entire bladder may be removed. You may also have radiation or chemotherapy to kill the cancer cells. Sometimes people get treatment with medicines that help the body's natural defenses, or immune system, fight the cancer. Finding out that you have cancer is scary. You may feel many emotions and may need some help coping. Seek out family, friends, and counselors for support. You also can do things at home to make yourself feel better while you go through treatment. Call the [employer] Baljit Schmid (2-561.587.6243) or visit its website at [employer] [employer]. org for more information. Follow-up care is a key part of your treatment and safety. Be sure to make and go to all appointments, and call your doctor if you are having problems. It's also a good idea to know your test results and keep a list of the medicines you take. How can you care for yourself at home? · Take your medicines exactly as prescribed.  Call your doctor if you think you are having a problem with your medicine. You may get medicine for nausea and vomiting if you have these side effects. · Eat healthy food. If you are not hungry, try to eat food that has protein and extra calories to keep up your strength and prevent weight loss. Drink liquid meal replacements for extra calories and protein. Try to eat your main meal early. · Get some physical activity every day, but do not get too tired. Keep doing the hobbies you enjoy as your energy allows. · Take steps to control your stress and workload. Learn relaxation techniques. ¨ Share your feelings. Stress and tension affect our emotions. By expressing your feelings to others, you may be able to understand and cope with them. ¨ Consider joining a support group. Talking about a problem with your spouse, a good friend, or other people with similar problems is a good way to reduce tension and stress. ¨ Express yourself through art. Try writing, dance, art, or crafts to relieve tension. Some dance, writing, or art groups may be available just for people who have cancer. ¨ Be kind to your body and mind. Getting enough sleep, eating a healthy diet, and taking time to do things you enjoy can contribute to an overall feeling of balance in your life and help reduce stress. ¨ Get help if you need it. Discuss your concerns with your doctor or counselor. · If you are vomiting or have diarrhea: ¨ Drink plenty of fluids (enough so that your urine is light yellow or clear like water) to prevent dehydration. Choose water and other caffeine-free clear liquids. If you have kidney, heart, or liver disease and have to limit fluids, talk with your doctor before you increase the amount of fluids you drink. ¨ When you are able to eat, try clear soups, mild foods, and liquids until all symptoms are gone for 12 to 48 hours. Other good choices include dry toast, crackers, cooked cereal, and gelatin dessert, such as Jell-O. · Take care of your urinary tract to prevent problems such as infection, which can be caused by bladder cancer and its treatment. Limit drinks with caffeine, drink plenty of fluids, and urinate every 3 to 4 hours. · If you have not already done so, prepare a list of advance directives. Advance directives are instructions to your doctor and family members about what kind of care you want if you become unable to speak for yourself. When should you call for help? Call your doctor now or seek immediate medical care if: 
? · You have pain that does not get better after taking pain medicine. ? · You have symptoms of a kidney infection. These may include: 
¨ Pain or burning when you urinate. ¨ A frequent need to urinate without being able to pass much urine. ¨ Pain in the flank, which is just below the rib cage and above the waist on either side of the back. ¨ Blood in your urine. ¨ A fever. ? Watch closely for changes in your health, and be sure to contact your doctor if: 
? · You do not get better as expected. Where can you learn more? Go to http://tramaine-alis.info/. Enter M796 in the search box to learn more about \"Bladder Cancer: Care Instructions. \" Current as of: May 12, 2017 Content Version: 11.4 © 9682-8825 [employer]. Care instructions adapted under license by [employer] (which disclaims liability or warranty for this information). If you have questions about a medical condition or this instruction, always ask your healthcare professional. [place] [address] any warranty or liability for your use of this information. Introducing [facility]! Tanner Hernandez introduces Snipd patient portal. Now you can access parts of your medical record, email your doctor's office, and request medication refills online. 1. In your internet browser, go to [email or web address] 2. Click on the First Time User? Click Here link in the Sign In box. You will see the New Member Sign Up page. 3. Enter your [employer] Access Code exactly as it appears below. You will not need to use this code after youve completed the sign-up process. If you do not sign up before the expiration date, you must request a new code. · [employer] Access Code: [email or web address] Expires: 2/5/2018 11:06 AM 
 
4. Enter the last four digits of your Social Security Number (xxxx) and Date of Birth (mm/dd/yyyy) as indicated and click Submit. You will be taken to the next sign-up page. 5. Create a [employer] ID. This will be your [employer] login ID and cannot be changed, so think of one that is secure and easy to remember. 6. Create a [employer] password. You can change your password at any time. 7. Enter your Password Reset Question and Answer. This can be used at a later time if you forget your password. 8. Enter your e-mail address. You will receive e-mail notification when new information is available in [address] 9. Click Sign Up. You can now view and download portions of your medical record. 10. Click the Download Summary menu link to download a portable copy of your medical information. If you have questions, please visit the Frequently Asked Questions section of the [employer] website. Remember, [employer] is NOT to be used for urgent needs. For medical emergencies, dial 911. Now available from your iPhone and Android! Please provide this summary of care documentation to your next provider. Your primary care clinician is listed as Elvis Negron. If you have any questions after today's visit, please call [phone].

## 2017-11-14 NOTE — PROGRESS NOTES
Mr. Liliana Braun has a reminder for a \"due or due soon\" health maintenance. I have asked that he contact his primary care provider for follow-up on this health maintenance.
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
## 2017-11-14 NOTE — PROCEDURES
Under satisfactory skin prep the coudé tip catheter was inserted into the bladder and the bladder was emptied of clear urine. 50 cc of freshly prepared BCG was then instilled into the bladder under gravity. The catheter was removed in the usual post instructions were given return in 1 week for installation #3.

## 2017-11-14 NOTE — PROGRESS NOTES
[facility]  OFFICE PROCEDURE PROGRESS NOTE        Chart reviewed for the following:   Re PETTIT LPN, have reviewed the History, Physical and updated the Allergic reactions for [place] performed immediately prior to start of procedure:   Vanessa Baltazar LPN, have performed the following reviews on Jonnie Mujica prior to the start of the procedure:            * Patient was identified by name and date of birth   * Agreement on procedure being performed was verified  * Risks and Benefits explained to the patient  * Procedure site verified and marked as necessary  * Patient was positioned for comfort  * Consent was signed and verified     Time: 10:40AM      Date of procedure: 11/14/2017    Procedure performed by:  Genie Sanford MD    Provider assisted by: Walter Cormier LPN    Patient assisted by: self    How tolerated by patient: tolerated the procedure well with no complications    Post Procedural Pain Scale: 0 - No Hurt    Comments: none    Patient verbalized understanding of procedure and post procedure instructions.

## 2017-11-15 NOTE — PROGRESS NOTES
RBV Per Dr. Gm Mayorga order placed for BCG instilled into the bladder today in office by Dr. Gm Mayorga.

## 2017-11-21 ENCOUNTER — OFFICE VISIT (OUTPATIENT)
Dept: UROLOGY | Age: 57
End: 2017-11-21

## 2017-11-21 VITALS
HEART RATE: 62 BPM | SYSTOLIC BLOOD PRESSURE: 135 MMHG | BODY MASS INDEX: 30.05 KG/M2 | WEIGHT: 187 LBS | HEIGHT: 66 IN | DIASTOLIC BLOOD PRESSURE: 91 MMHG | OXYGEN SATURATION: 99 %

## 2017-11-21 DIAGNOSIS — C67.2 MALIGNANT NEOPLASM OF LATERAL WALL OF URINARY BLADDER (HCC): Primary | ICD-10-CM

## 2017-11-21 LAB
BILIRUB UR QL STRIP: NEGATIVE
GLUCOSE UR-MCNC: NEGATIVE MG/DL
KETONES P FAST UR STRIP-MCNC: NEGATIVE MG/DL
PH UR STRIP: 6.5 [PH] (ref 4.6–8)
PROT UR QL STRIP: NEGATIVE
SP GR UR STRIP: 1.01 (ref 1–1.03)
UA UROBILINOGEN AMB POC: NORMAL (ref 0.2–1)
URINALYSIS CLARITY POC: CLEAR
URINALYSIS COLOR POC: YELLOW
URINE BLOOD POC: NEGATIVE
URINE LEUKOCYTES POC: NORMAL
URINE NITRITES POC: NEGATIVE

## 2017-11-21 NOTE — MR AVS SNAPSHOT
Visit Information Date & Time Provider Department Dept. Phone Encounter #  
 11/21/2017  9:15 AM Margret Peck, [facility] [phone] [id number] Your Appointments 1/23/2018  9:00 AM  
PROCEDURE with Palak Ramirez MD  
[facility] [address]) Appt Note: cysto after 3 BCG's  
 [address] [place] [address] [address]  
[phone] [address] [place] [address] Upcoming Health Maintenance Date Due Hepatitis C Screening 1960 Pneumococcal 19-64 Highest Risk (1 of 3 - PCV13) 5/4/1981 DTaP/Tdap/Td series (1 - Tdap) 5/4/1983 FOBT Q 1 YEAR AGE 50-75 5/4/2012 Influenza Age 5 to Adult 8/1/2017 Allergies as of 11/21/2017  Review Complete On: 11/21/2017 By: Vicki Mehta LPN No Known Allergies Current Immunizations  Never Reviewed No immunizations on file. Not reviewed this visit You Were Diagnosed With   
  
 Codes Comments Malignant neoplasm of urinary bladder, unspecified site [facility])    -  Primary ICD-10-CM: C67.9 ICD-9-CM: 188. 9 Vitals BP Pulse Height(growth percentile) Weight(growth percentile) SpO2 BMI  
 (!) 135/91 (BP 1 Location: Left arm, BP Patient Position: Sitting) 62 5' 6\" (1.676 m) 187 lb (84.8 kg) 99% 30.18 kg/m2 Smoking Status Former Smoker BMI and BSA Data Body Mass Index Body Surface Area  
 30.18 kg/m 2 1.99 m 2 Preferred Pharmacy Pharmacy Name Phone [address], [address] [place] [address] [phone] Your Updated Medication List  
  
   
This list is accurate as of: 11/21/17 10:28 AM.  Always use your most recent med list.  
  
  
  
  
 gabapentin 800 mg tablet Commonly known as:  NEURONTIN Take 800 mg by mouth three (3) times daily. losartan 100 mg tablet Commonly known as:  COZAAR  
 Take 100 mg by mouth daily. meloxicam 15 mg tablet Commonly known as:  MOBIC Take 1 tab daily or 1/2 tab twice daily as needed pain with food. methocarbamol 750 mg tablet Commonly known as:  ROBAXIN Take 1 Tab by mouth four (4) times daily as needed. Take as needed for muscle spasms  
  
 nicotine 14 mg/24 hr patch Commonly known as:  NICODERM CQ  
APPLY 1 PATCH TOPICALLY EVERY 24 HOURS FOR 6 WEEKS * ANA BCG 50 mg injection Generic drug:  BCG live 50 mg by IntraVESical route once. * BCG live 50 mg injection Commonly known as:  ANA 50 mg by IntraVESical route once for 1 dose. * Notice: This list has 2 medication(s) that are the same as other medications prescribed for you. Read the directions carefully, and ask your doctor or other care provider to review them with you. We Performed the Following AMB POC URINALYSIS DIP STICK AUTO W/O MICRO [81309 CPT(R)] BCG LIVE INTRAVESICAL VAC [ [facility]] [place] INSTILL ANTICANCER AGENT IN BLADDER [id number] CPT(R)] Patient Instructions Bacillus of [address] (Inside the bladder) Bacillus of [place] (ba-SILL-us of Vasu-met and SHELL-in) Treats or prevents bladder cancer, and used to prevent certain bladder tumors. Also called Bacillus Calmette-Monica (or BCG). Brand Name(s): Theracys, Challis BCG There may be other brand names for this medicine. When This Medicine Should Not Be Used: You should not use this medicine if your immune system is weak due to other medicines or illness. You should not use this medicine if you have a fever (unless your doctor knows the cause) or if you have a bladder infection. You may not be able to receive this medicine if you have active tuberculosis, or blood in your urine. How to Use This Medicine:  
Liquid · Do not drink fluids for 4 hours before your treatment. Empty your bladder before you receive this medicine. · This medicine is given by running the liquid through a tube (catheter) into your bladder. The tube is then taken out, and you will hold the medicine in your bladder for two hours. · While this medicine is in the bladder, you should lie on your back, stomach, and each side for 15 minutes in each position. · You can get up after the first hour, but hold the medicine in your bladder for 1 more hour. If you are unable to hold it, tell your doctor or nurse. · Treatment with BCG may be given once a week for 6 weeks and approximately each month afterward for 6 to 12 months, or as your doctor orders. · Drink plenty of water during each treatment. · After each treatment, all patients (men and women) should sit down to urinate. · Any time you urinate in the first 6 hours after treatment, disinfect the urine by adding an equal amount (usually about 8 ounces, or 1 cup) of household bleach to the toilet bowl and letting it stand for 15 minutes before flushing. · A nurse or other health provider will give you this medicine. Drugs and Foods to Avoid: Ask your doctor or pharmacist before using any other medicine, including over-the-counter medicines, vitamins, and herbal products. · Make sure your doctor knows if you are also using a medicine to treat an infection or tuberculosis. Some tuberculosis medicines are ethambutol, isoniazid, and rifampin. Medicines to treat an infection (antibiotics) include amoxicillin, azithromycin, penicillin, Augmentin®, Bactrim®, and Cipro®. · Tell your doctor if you are using any medicine that weakens your immune system, such as steroids or cancer treatments. Some steroids are dexamethasone, prednisolone, prednisone, and Medrol®. Warnings While Using This Medicine: · Before you begin your treatment, make sure your doctor knows if you are pregnant or breast feeding. Do not get pregnant while you are being treated with this medicine. · Tell your doctor if you have any changes in urinary habits after treatment with this medicine. Changes include urinating more or less than usual or having trouble urinating. · Some people develop infections after receiving this medicine. Call your doctor right away if you start to have flu-like symptoms. These symptoms include chills, weakness, and fever (103 degrees or higher) that lasts longer than 72 hours. · Your doctor will do lab tests at regular visits to check on the effects of this medicine. Keep all appointments. Possible Side Effects While Using This Medicine:  
Call your doctor right away if you notice any of these side effects: · Blood in your urine. · Burning or pain when you urinate. · Fever (103 degrees F or higher), chills, or extreme tiredness. · Severe skin rash. · Uncontrollable shaking or trembling. · Urinating more than usual, or feeling like you have to urinate more often. If you notice these less serious side effects, talk with your doctor: · Cough. · Mild skin rash. · Nausea or vomiting. · Pain in your joints. If you notice other side effects that you think are caused by this medicine, tell your doctor. Call your doctor for medical advice about side effects. You may report side effects to FDA at 1-007-FDA-9419 © 2017 [address] Information is for End User's use only and may not be sold, redistributed or otherwise used for commercial purposes. The above information is an  only. It is not intended as medical advice for individual conditions or treatments. Talk to your doctor, nurse or pharmacist before following any medical regimen to see if it is safe and effective for you. Introducing [facility]! Shakeel Oliver introduces [employer] patient portal. Now you can access parts of your medical record, email your doctor's office, and request medication refills online.    
 
1. In your internet browser, go to [email or web address] 2. Click on the First Time User? Click Here link in the Sign In box. You will see the New Member Sign Up page. 3. Enter your [employer] Access Code exactly as it appears below. You will not need to use this code after youve completed the sign-up process. If you do not sign up before the expiration date, you must request a new code. · [employer] Access Code: [email or web address] Expires: 2/5/2018 11:06 AM 
 
4. Enter the last four digits of your Social Security Number (xxxx) and Date of Birth (mm/dd/yyyy) as indicated and click Submit. You will be taken to the next sign-up page. 5. Create a In[employer] ID. This will be your [employer] login ID and cannot be changed, so think of one that is secure and easy to remember. 6. Create a [employer] password. You can change your password at any time. 7. Enter your Password Reset Question and Answer. This can be used at a later time if you forget your password. 8. Enter your e-mail address. You will receive e-mail notification when new information is available in [address] 9. Click Sign Up. You can now view and download portions of your medical record. 10. Click the Download Summary menu link to download a portable copy of your medical information. If you have questions, please visit the Frequently Asked Questions section of the [employer] website. Remember, [employer] is NOT to be used for urgent needs. For medical emergencies, dial 911. Now available from your iPhone and Android! Please provide this summary of care documentation to your next provider. Your primary care clinician is listed as Augie Hansen. If you have any questions after today's visit, please call [phone].

## 2017-11-21 NOTE — PATIENT INSTRUCTIONS
Bacillus of [address] (Inside the bladder)   Bacillus of [place] (ba-SILL-us of Vasu-met and SHELL-in)  Treats or prevents bladder cancer, and used to prevent certain bladder tumors. Also called Bacillus Calmette-Monica (or BCG). Brand Name(s): Thergeovany Lynn BCG   There may be other brand names for this medicine. When This Medicine Should Not Be Used: You should not use this medicine if your immune system is weak due to other medicines or illness. You should not use this medicine if you have a fever (unless your doctor knows the cause) or if you have a bladder infection. You may not be able to receive this medicine if you have active tuberculosis, or blood in your urine. How to Use This Medicine:   Liquid  · Do not drink fluids for 4 hours before your treatment. Empty your bladder before you receive this medicine. · This medicine is given by running the liquid through a tube (catheter) into your bladder. The tube is then taken out, and you will hold the medicine in your bladder for two hours. · While this medicine is in the bladder, you should lie on your back, stomach, and each side for 15 minutes in each position. · You can get up after the first hour, but hold the medicine in your bladder for 1 more hour. If you are unable to hold it, tell your doctor or nurse. · Treatment with BCG may be given once a week for 6 weeks and approximately each month afterward for 6 to 12 months, or as your doctor orders. · Drink plenty of water during each treatment. · After each treatment, all patients (men and women) should sit down to urinate. · Any time you urinate in the first 6 hours after treatment, disinfect the urine by adding an equal amount (usually about 8 ounces, or 1 cup) of household bleach to the toilet bowl and letting it stand for 15 minutes before flushing. · A nurse or other health provider will give you this medicine.   Drugs and Foods to Avoid:   Ask your doctor or pharmacist before using any other medicine, including over-the-counter medicines, vitamins, and herbal products. · Make sure your doctor knows if you are also using a medicine to treat an infection or tuberculosis. Some tuberculosis medicines are ethambutol, isoniazid, and rifampin. Medicines to treat an infection (antibiotics) include amoxicillin, azithromycin, penicillin, Augmentin®, Bactrim®, and Cipro®. · Tell your doctor if you are using any medicine that weakens your immune system, such as steroids or cancer treatments. Some steroids are dexamethasone, prednisolone, prednisone, and Medrol®. Warnings While Using This Medicine:   · Before you begin your treatment, make sure your doctor knows if you are pregnant or breast feeding. Do not get pregnant while you are being treated with this medicine. · Tell your doctor if you have any changes in urinary habits after treatment with this medicine. Changes include urinating more or less than usual or having trouble urinating. · Some people develop infections after receiving this medicine. Call your doctor right away if you start to have flu-like symptoms. These symptoms include chills, weakness, and fever (103 degrees or higher) that lasts longer than 72 hours. · Your doctor will do lab tests at regular visits to check on the effects of this medicine. Keep all appointments. Possible Side Effects While Using This Medicine:   Call your doctor right away if you notice any of these side effects:  · Blood in your urine. · Burning or pain when you urinate. · Fever (103 degrees F or higher), chills, or extreme tiredness. · Severe skin rash. · Uncontrollable shaking or trembling. · Urinating more than usual, or feeling like you have to urinate more often. If you notice these less serious side effects, talk with your doctor:   · Cough. · Mild skin rash. · Nausea or vomiting. · Pain in your joints.   If you notice other side effects that you think are caused by this medicine, tell your doctor. Call your doctor for medical advice about side effects. You may report side effects to FDA at 6-323-HXU-6736  © 2017 [facility] Information is for End User's use only and may not be sold, redistributed or otherwise used for commercial purposes. The above information is an  only. It is not intended as medical advice for individual conditions or treatments. Talk to your doctor, nurse or pharmacist before following any medical regimen to see if it is safe and effective for you.
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
## 2017-11-21 NOTE — PROGRESS NOTES
Mr. Lul Moy has a reminder for a \"due or due soon\" health maintenance. I have asked that he contact his primary care provider for follow-up on this health maintenance.

## 2017-11-21 NOTE — PROCEDURES
Under satisfactory skin prep a catheter was inserted into the bladder and the urine was drained. 50 cc of freshly prepared BCG was then instilled into the bladder with no difficulty. He tolerated this well will return in about 2 months for follow-up cystoscopy here in the office by Dr. Cata Hernandez.

## 2018-01-23 ENCOUNTER — OFFICE VISIT (OUTPATIENT)
Dept: UROLOGY | Age: 58
End: 2018-01-23

## 2018-01-23 VITALS
HEIGHT: 66 IN | HEART RATE: 59 BPM | WEIGHT: 192 LBS | DIASTOLIC BLOOD PRESSURE: 93 MMHG | OXYGEN SATURATION: 98 % | BODY MASS INDEX: 30.86 KG/M2 | TEMPERATURE: 97.4 F | SYSTOLIC BLOOD PRESSURE: 153 MMHG

## 2018-01-23 DIAGNOSIS — C67.9 MALIGNANT NEOPLASM OF URINARY BLADDER, UNSPECIFIED SITE (HCC): Primary | ICD-10-CM

## 2018-01-23 LAB
BILIRUB UR QL STRIP: NEGATIVE
GLUCOSE UR-MCNC: NEGATIVE MG/DL
KETONES P FAST UR STRIP-MCNC: NEGATIVE MG/DL
PH UR STRIP: 7 [PH] (ref 4.6–8)
PROT UR QL STRIP: NORMAL
SP GR UR STRIP: 1.02 (ref 1–1.03)
UA UROBILINOGEN AMB POC: NORMAL (ref 0.2–1)
URINALYSIS CLARITY POC: CLEAR
URINALYSIS COLOR POC: YELLOW
URINE BLOOD POC: NORMAL
URINE LEUKOCYTES POC: NORMAL
URINE NITRITES POC: NEGATIVE

## 2018-01-23 NOTE — PROGRESS NOTES
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 There was no muscularis present. Repeat resection was not done because the patient had a quite significant obturator response to the resection with penetration through the wall. The patient completed his full protocol of 6 BCG installations and then completed his first in a series of 3 BCG installations in November. His last 3 month cystoscopy was unremarkable. He comes now for a 3 month cystoscopy. The patient is having no symptoms. The patient does have blood and white blood cells in his urine and culture will be done    The patient is prepared for cystoscopy. He has been counseled regarding the risks which include, but are not limited to, infection, bleeding, injury, failure to diagnose, and possible need for additional procedures. He wishes to proceed, giving his informed consent. Procedure note: The patient is in the slightly inclined supine position where he is prepped and draped in sterile fashion. Lidocaine jelly has been inserted into the urethra and a suitable time has been permitted to elapse for establishment of anesthesia. A flexible cystoscope is inserted and video cystoscopy is carried out with the patient able to observe the monitor. The findings are as follows:  1. Pendulous urethra normal  2. Bulbar urethra normal  3. External sphincter normal  4. Prostate   5. Bladder neck  6. Trigone  7. Bladder wall there is obvious scar on the left side wall secondary to previous resection. There is no evidence of epithelial abnormality or inflammation of any kind    Pertinent observations are pointed out to the patient and all the patient's questions are entertained. The procedure is terminated . Full discussion regarding the cystoscopic findings will be carried out.  Following removal of the cystoscope, the patient is left in position for a suitable period of time and allowed to dress. Post procedure instructions are given. The patient tolerated the procedure well. The patient will begin his next series of BCG installations in mid February. The patient will return in mid April for a repeat 3 month surveillance cystoscopy.   This is again discussed with the patient

## 2018-01-23 NOTE — PROGRESS NOTES
[facility]  OFFICE PROCEDURE PROGRESS NOTE        Chart reviewed for the following:   Raheem PETTIT LPN, have reviewed the History, Physical and updated the Allergic reactions for [place] performed immediately prior to start of procedure:   Betty Patel LPN, have performed the following reviews on Zula Minium prior to the start of the procedure:            * Patient was identified by name and date of birth   * Agreement on procedure being performed was verified  * Risks and Benefits explained to the patient  * Procedure site verified and marked as necessary  * Patient was positioned for comfort  * Consent was signed and verified     Time: 9:27AM      Date of procedure: 1/23/2018    Procedure performed by:  Ganga Arechiga MD    Provider assisted by: [facility]    Patient assisted by: self    How tolerated by patient: tolerated the procedure well with no complications    Post Procedural Pain Scale: 0 - No Hurt    Comments: none    Patient verbalized understanding of procedure and post procedure instructions.

## 2018-01-23 NOTE — MR AVS SNAPSHOT
[address] [facility] [place] [address] [address] 
[phone] Patient: Nicki Estrada MRN: [id number] [facility]:[birth year] Visit Information Date & Time Provider Department Dept. Phone Encounter #  
 1/23/2018  9:00 AM Aldo Marte [phone] [id number] Follow-up Instructions Return in about 1 month (around 2/23/2018) for begin BCG series in mid February; repeat 3 month cystoscopy mid April. Follow-up and Disposition History Your Appointments 2/13/2018  9:15 AM  
PROCEDURE with Alejandro Lewis MD  
[facility]t [facility]: BCG #1  
 [address] [place] [address] [address]  
[phone] [phone]  
  
    
 2/20/2018  8:45 AM  
PROCEDURE with Abhishek Lacy MD  
[facility]) Appt Note: BCG #2  
 [address] [place] [address] [address]  
[phone] [phone]  
  
    
 2/27/2018  9:15 AM  
PROCEDURE with Chastity Briscoe MD  
[facility] Appt Note: BCG #3  
 [address] [place] [address] [address]  
[phone] [address] [facility] [address] Upcoming Health Maintenance Date Due Hepatitis C Screening 1960 Pneumococcal 19-64 Highest Risk (1 of 3 - PCV13) 5/4/1981 DTaP/Tdap/Td series (1 - Tdap) 5/4/1983 FOBT Q 1 YEAR AGE 50-75 5/4/2012 Influenza Age 5 to Adult 8/1/2017 Allergies as of 1/23/2018  Review Complete On: 1/23/2018 By: Chastity Briscoe MD  
 No Known Allergies Current Immunizations  Never Reviewed No immunizations on file. Not reviewed this visit You Were Diagnosed With   
  
 Codes Comments  Malignant neoplasm of urinary bladder, unspecified site ([facility] [address]    - Primary ICD-10-CM: C67.9 ICD-9-CM: 188. 9 Vitals BP Pulse Temp Height(growth percentile) Weight(growth percentile) SpO2  
 (!) 153/93 (BP 1 Location: Left arm, BP Patient Position: Sitting) (!) 59 97.4 °F (36.3 °C) 5' 6\" (1.676 m) 192 lb (87.1 kg) 98% BMI Smoking Status 30.99 kg/m2 Current Every Day Smoker Vitals History BMI and BSA Data Body Mass Index Body Surface Area 30.99 kg/m 2 2.01 m 2 Preferred Pharmacy Pharmacy Name Phone [address], [address] [place] [address] [phone] Your Updated Medication List  
  
   
This list is accurate as of: 1/23/18  9:18 AM.  Always use your most recent med list.  
  
  
  
  
 gabapentin 800 mg tablet Commonly known as:  NEURONTIN Take 800 mg by mouth three (3) times daily. losartan 100 mg tablet Commonly known as:  COZAAR Take 100 mg by mouth daily. meloxicam 15 mg tablet Commonly known as:  MOBIC Take 1 tab daily or 1/2 tab twice daily as needed pain with food. methocarbamol 750 mg tablet Commonly known as:  ROBAXIN Take 1 Tab by mouth four (4) times daily as needed. Take as needed for muscle spasms  
  
 nicotine 14 mg/24 hr patch Commonly known as:  NICODERM CQ  
APPLY 1 PATCH TOPICALLY EVERY 24 HOURS FOR 6 WEEKS  
  
 ANA BCG 50 mg injection Generic drug:  BCG live 50 mg by IntraVESical route once. We Performed the Following AMB POC URINALYSIS DIP STICK AUTO W/O MICRO [80701 CPT(R)] CYSTOSCOPY [40977 CPT(R)] Follow-up Instructions Return in about 1 month (around 2/23/2018) for begin BCG series in mid February; repeat 3 month cystoscopy mid April. Patient Instructions Cystoscopy: Care Instructions Your Care Instructions Cystoscopy is a test. It uses a thin, lighted tube called a cystoscope to see the inside of the bladder and the urethra.  The urethra is the tube that carries urine out of the body. This test is helpful because it lets your doctor see areas of your bladder and urethra that are hard to see on X-rays. It can help your doctor find bladder stones, tumors, bleeding, and infection. During this test, your doctor also can take tissue and urine samples. And if your doctor finds small stones or growths, he or she can remove them. In most cases the scope is in the bladder for less than 10 minutes. But the entire test may take 45 minutes or longer. You will probably get local anesthesia. This numbs a small part of your body. Or you may get spinal anesthesia, which numbs more of your body. Once in a while, doctors use general anesthesia. It makes you sleep during surgery. If you get a local anesthetic, you may be able to get up right after the test. But if you had spinal or general anesthesia, you will stay in the recovery room until you are able to walk or you have feeling again in your lower body. This usually takes about an hour. Your doctor may be able to tell you some of the results right after the test. But the complete results may take several days. Follow-up care is a key part of your treatment and safety. Be sure to make and go to all appointments, and call your doctor if you are having problems. It's also a good idea to know your test results and keep a list of the medicines you take. How can you care for yourself at home? Before the test 
· If you are having a local anesthetic, you can eat and drink before the test. 
· If you are having a spinal or general anesthetic, do not eat or drink anything for at least 8 hours before the test. Tell your doctor what medicines you take. · If you are not staying overnight in the hospital, make sure you have someone who can drive you home after the test. 
After the test 
· If your doctor prescribed antibiotics, take them as directed. Do not stop taking them just because you feel better.  You need to take the full course of antibiotics. · You may have some burning when you urinate for a day or two after the test. You may feel better if you drink more fluids. This may also help prevent an infection. · Your urine may have a pinkish color for a few days after the test. 
When should you call for help? Call your doctor now or seek immediate medical care if: 
? · Your urine is still red or you see blood clots after you have urinated several times. ? · You cannot pass urine 8 hours after the test.  
? · You get a fever or chills. ? · You have pain in your belly or your back just below your rib cage. This is also called flank pain. ? Watch closely for changes in your health, and be sure to contact your doctor if: 
? · You have pain or burning when you urinate. A burning sensation is normal for a day or two after the test. But call if it does not get better. ? · You have a frequent urge to urinate but can pass only small amounts of urine. ? · Your urine is pink, red, or cloudy or smells bad. It is normal for the urine to have a pinkish color for a few days after the test. But call if it does not get better. ? · You do not get better as expected. Where can you learn more? Go to http://tramaine-alis.info/. Enter W820 in the search box to learn more about \"Cystoscopy: Care Instructions. \" Current as of: May 12, 2017 Content Version: 11.4 © 5387-5510 [employer]. Care instructions adapted under license by [employer] (which disclaims liability or warranty for this information). If you have questions about a medical condition or this instruction, always ask your healthcare professional. [place] [address] any warranty or liability for your use of this information. Patient Instructions History Introducing [facility] & HEALTH SERVICES!    
 [facility] moziy introduces TuTanda patient portal. Now you can access parts of your medical record, email your doctor's office, and request medication refills online. 1. In your internet browser, go to [email or web address] 2. Click on the First Time User? Click Here link in the Sign In box. You will see the New Member Sign Up page. 3. Enter your [employer]in[employer] Access Code exactly as it appears below. You will not need to use this code after youve completed the sign-up process. If you do not sign up before the expiration date, you must request a new code. · CodinGame Access Code: [email or web address] Expires: 2/5/2018 11:06 AM 
 
4. Enter the last four digits of your Social Security Number (xxxx) and Date of Birth (mm/dd/yyyy) as indicated and click Submit. You will be taken to the next sign-up page. 5. Create a CodinGame ID. This will be your CodinGame login ID and cannot be changed, so think of one that is secure and easy to remember. 6. Create a CodinGame password. You can change your password at any time. 7. Enter your Password Reset Question and Answer. This can be used at a later time if you forget your password. 8. Enter your e-mail address. You will receive e-mail notification when new information is available in [address] 9. Click Sign Up. You can now view and download portions of your medical record. 10. Click the Download Summary menu link to download a portable copy of your medical information. If you have questions, please visit the Frequently Asked Questions section of the [employer] website. Remember, [employer]inGame is NOT to be used for urgent needs. For medical emergencies, dial 911. Now available from your iPhone and Android! Please provide this summary of care documentation to your next provider. Your primary care clinician is listed as Alfonso Flores. If you have any questions after today's visit, please call [phone].

## 2018-01-23 NOTE — PATIENT INSTRUCTIONS
Cystoscopy: Care Instructions  Your Care Instructions  Cystoscopy is a test. It uses a thin, lighted tube called a cystoscope to see the inside of the bladder and the urethra. The urethra is the tube that carries urine out of the body. This test is helpful because it lets your doctor see areas of your bladder and urethra that are hard to see on X-rays. It can help your doctor find bladder stones, tumors, bleeding, and infection. During this test, your doctor also can take tissue and urine samples. And if your doctor finds small stones or growths, he or she can remove them. In most cases the scope is in the bladder for less than 10 minutes. But the entire test may take 45 minutes or longer. You will probably get local anesthesia. This numbs a small part of your body. Or you may get spinal anesthesia, which numbs more of your body. Once in a while, doctors use general anesthesia. It makes you sleep during surgery. If you get a local anesthetic, you may be able to get up right after the test. But if you had spinal or general anesthesia, you will stay in the recovery room until you are able to walk or you have feeling again in your lower body. This usually takes about an hour. Your doctor may be able to tell you some of the results right after the test. But the complete results may take several days. Follow-up care is a key part of your treatment and safety. Be sure to make and go to all appointments, and call your doctor if you are having problems. It's also a good idea to know your test results and keep a list of the medicines you take. How can you care for yourself at home? Before the test  · If you are having a local anesthetic, you can eat and drink before the test.  · If you are having a spinal or general anesthetic, do not eat or drink anything for at least 8 hours before the test. Tell your doctor what medicines you take.   · If you are not staying overnight in the hospital, make sure you have someone who can drive you home after the test.  After the test  · If your doctor prescribed antibiotics, take them as directed. Do not stop taking them just because you feel better. You need to take the full course of antibiotics. · You may have some burning when you urinate for a day or two after the test. You may feel better if you drink more fluids. This may also help prevent an infection. · Your urine may have a pinkish color for a few days after the test.  When should you call for help? Call your doctor now or seek immediate medical care if:  ? · Your urine is still red or you see blood clots after you have urinated several times. ? · You cannot pass urine 8 hours after the test.   ? · You get a fever or chills. ? · You have pain in your belly or your back just below your rib cage. This is also called flank pain. ? Watch closely for changes in your health, and be sure to contact your doctor if:  ? · You have pain or burning when you urinate. A burning sensation is normal for a day or two after the test. But call if it does not get better. ? · You have a frequent urge to urinate but can pass only small amounts of urine. ? · Your urine is pink, red, or cloudy or smells bad. It is normal for the urine to have a pinkish color for a few days after the test. But call if it does not get better. ? · You do not get better as expected. Where can you learn more? Go to http://tramaine-alis.info/. Enter V940 in the search box to learn more about \"Cystoscopy: Care Instructions. \"  Current as of: May 12, 2017  Content Version: 11.4  © 0113-6589 [employer]. Care instructions adapted under license by [employer] (which disclaims liability or warranty for this information).  If you have questions about a medical condition or this instruction, always ask your healthcare professional. [address] any warranty or liability for your use of this information.

## 2018-01-23 NOTE — PROGRESS NOTES
Mr. Cielo Patricio has a reminder for a \"due or due soon\" health maintenance. I have asked that he contact his primary care provider for follow-up on this health maintenance.

## 2018-03-05 ENCOUNTER — OFFICE VISIT (OUTPATIENT)
Dept: UROLOGY | Age: 58
End: 2018-03-05

## 2018-03-05 VITALS
HEIGHT: 66 IN | OXYGEN SATURATION: 99 % | WEIGHT: 193 LBS | SYSTOLIC BLOOD PRESSURE: 135 MMHG | BODY MASS INDEX: 31.02 KG/M2 | DIASTOLIC BLOOD PRESSURE: 89 MMHG | HEART RATE: 58 BPM

## 2018-03-05 DIAGNOSIS — C67.9 MALIGNANT NEOPLASM OF URINARY BLADDER, UNSPECIFIED SITE (HCC): Primary | ICD-10-CM

## 2018-03-05 RX ORDER — VALACYCLOVIR HYDROCHLORIDE 500 MG/1
TABLET, FILM COATED ORAL
COMMUNITY
Start: 2018-02-12

## 2018-03-05 NOTE — MR AVS SNAPSHOT
[address] [facility] [place] [address] [address] 
[phone] Patient: Loyda Villatoro MRN: [id number] MTM:3/2/1810 Visit Information Date & Time Provider Department Dept. Phone Encounter #  
 3/5/2018  9:00 AM Shaniqua Alcala [place] [facility] [phone] [id number] Your Appointments 3/13/2018  9:15 AM  
PROCEDURE with Constance Stockton MD  
[facility] [address] [place] [address]) Appt Note: BCG #2; No Auth Required per [employer] at ins call VTJ#[id number]; .  
 [address] [facility] [address] [place] [address] [address]  
[phone] [phone]  
  
    
 3/20/2018  9:15 AM  
PROCEDURE with Sugar Le MD  
[facility] [address] [place] [address]) Appt Note: BCG #3; No Auth Required per [employer] at ins call [id number]; .  
 [address] [place] [address] [address]  
[phone] [address] [facility] [address] [place] [address] Upcoming Health Maintenance Date Due Hepatitis C Screening 1960 Pneumococcal 19-64 Highest Risk (1 of 3 - PCV13) 5/4/1981 DTaP/Tdap/Td series (1 - Tdap) 5/4/1983 FOBT Q 1 YEAR AGE 50-75 5/4/2012 Influenza Age 5 to Adult 8/1/2017 Allergies as of 3/5/2018  Review Complete On: 3/5/2018 By: Nati Weinberg LPN No Known Allergies Current Immunizations  Never Reviewed No immunizations on file. Not reviewed this visit You Were Diagnosed With   
  
 Codes Comments Malignant neoplasm of urinary bladder, unspecified site [facility])    -  Primary ICD-10-CM: C67.9 ICD-9-CM: 188. 9 Vitals BP Pulse Height(growth percentile) Weight(growth percentile) SpO2 BMI  
 135/89 (BP 1 Location: Right arm, BP Patient Position: Sitting) (!) 58 5' 6\" (1.676 m) 193 lb (87.5 kg) 99% 31.15 kg/m2 Smoking Status Current Every Day Smoker Vitals History BMI and BSA Data Body Mass Index Body Surface Area  
 31.15 kg/m 2 2.02 m 2 Preferred Pharmacy Pharmacy Name Phone [address], [address] [phone] Your Updated Medication List  
  
   
This list is accurate as of 3/5/18 10:09 AM.  Always use your most recent med list.  
  
  
  
  
 gabapentin 800 mg tablet Commonly known as:  NEURONTIN Take 800 mg by mouth three (3) times daily. losartan 100 mg tablet Commonly known as:  COZAAR Take 100 mg by mouth daily. meloxicam 15 mg tablet Commonly known as:  MOBIC Take 1 tab daily or 1/2 tab twice daily as needed pain with food. methocarbamol 750 mg tablet Commonly known as:  ROBAXIN Take 1 Tab by mouth four (4) times daily as needed. Take as needed for muscle spasms  
  
 nicotine 14 mg/24 hr patch Commonly known as:  NICODERM CQ  
APPLY 1 PATCH TOPICALLY EVERY 24 HOURS FOR 6 WEEKS  
  
 ANA BCG 50 mg injection Generic drug:  BCG live 50 mg by IntraVESical route once. valACYclovir 500 mg tablet Commonly known as:  VALTREX We Performed the Following AMB POC URINALYSIS DIP STICK AUTO W/O MICRO [90805 CPT(R)] Patient Instructions Bacillus of [address] (Inside the bladder) Bacillus of [place] and [place] (ba-SILL-us of Vasu-met and SHELL-in) Treats or prevents bladder cancer, and used to prevent certain bladder tumors. Also called Bacillus Calmet[place]-Monica (or BCG). Brand Name(s): Theracys, Ducor BCG There may be other brand names for this medicine. When This Medicine Should Not Be Used: You should not use this medicine if your immune system is weak due to other medicines or illness. You should not use this medicine if you have a fever (unless your doctor knows the cause) or if you have a bladder infection. You may not be able to receive this medicine if you have active tuberculosis, or blood in your urine. How to Use This Medicine:  
Liquid · Do not drink fluids for 4 hours before your treatment. Empty your bladder before you receive this medicine. · This medicine is given by running the liquid through a tube (catheter) into your bladder. The tube is then taken out, and you will hold the medicine in your bladder for two hours. · While this medicine is in the bladder, you should lie on your back, stomach, and each side for 15 minutes in each position. · You can get up after the first hour, but hold the medicine in your bladder for 1 more hour. If you are unable to hold it, tell your doctor or nurse. · Treatment with BCG may be given once a week for 6 weeks and approximately each month afterward for 6 to 12 months, or as your doctor orders. · Drink plenty of water during each treatment. · After each treatment, all patients (men and women) should sit down to urinate. · Any time you urinate in the first 6 hours after treatment, disinfect the urine by adding an equal amount (usually about 8 ounces, or 1 cup) of household bleach to the toilet bowl and letting it stand for 15 minutes before flushing. · A nurse or other health provider will give you this medicine. Drugs and Foods to Avoid: Ask your doctor or pharmacist before using any other medicine, including over-the-counter medicines, vitamins, and herbal products. · Make sure your doctor knows if you are also using a medicine to treat an infection or tuberculosis. Some tuberculosis medicines are ethambutol, isoniazid, and rifampin. Medicines to treat an infection (antibiotics) include amoxicillin, azithromycin, penicillin, Augmentin®, Bactrim®, and Cipro®. · Tell your doctor if you are using any medicine that weakens your immune system, such as steroids or cancer treatments. Some steroids are dexamethasone, prednisolone, prednisone, and Medrol®. Warnings While Using This Medicine: · Before you begin your treatment, make sure your doctor knows if you are pregnant or breast feeding. Do not get pregnant while you are being treated with this medicine. · Tell your doctor if you have any changes in urinary habits after treatment with this medicine. Changes include urinating more or less than usual or having trouble urinating. · Some people develop infections after receiving this medicine. Call your doctor right away if you start to have flu-like symptoms. These symptoms include chills, weakness, and fever (103 degrees or higher) that lasts longer than 72 hours. · Your doctor will do lab tests at regular visits to check on the effects of this medicine. Keep all appointments. Possible Side Effects While Using This Medicine:  
Call your doctor right away if you notice any of these side effects: · Blood in your urine. · Burning or pain when you urinate. · Fever (103 degrees F or higher), chills, or extreme tiredness. · Severe skin rash. · Uncontrollable shaking or trembling. · Urinating more than usual, or feeling like you have to urinate more often. If you notice these less serious side effects, talk with your doctor: · Cough. · Mild skin rash. · Nausea or vomiting. · Pain in your joints. If you notice other side effects that you think are caused by this medicine, tell your doctor. Call your doctor for medical advice about side effects. You may report side effects to FDA at 6-143-FDA-7199 © 2017 [address] Information is for End User's use only and may not be sold, redistributed or otherwise used for commercial purposes. The above information is an  only. It is not intended as medical advice for individual conditions or treatments. Talk to your doctor, nurse or pharmacist before following any medical regimen to see if it is safe and effective for you. Introducing [facility] & HEALTH SERVICES!    
 [employer] introduces testhub patient portal. Now you can access parts of your medical record, email your doctor's office, and request medication refills online. 1. In your internet browser, go to [email or web address] 2. Click on the First Time User? Click Here link in the Sign In box. You will see the New Member Sign Up page. 3. Enter your [employer] Access Code exactly as it appears below. You will not need to use this code after youve completed the sign-up process. If you do not sign up before the expiration date, you must request a new code. · [employer] Access Code: [email or web address] Expires: 6/3/2018  8:41 AM 
 
4. Enter the last four digits of your Social Security Number (xxxx) and Date of Birth (mm/dd/yyyy) as indicated and click Submit. You will be taken to the next sign-up page. 5. Create a [employer] ID. This will be your [employer] login ID and cannot be changed, so think of one that is secure and easy to remember. 6. Create a [employer] password. You can change your password at any time. 7. Enter your Password Reset Question and Answer. This can be used at a later time if you forget your password. 8. Enter your e-mail address. You will receive e-mail notification when new information is available in [address] 9. Click Sign Up. You can now view and download portions of your medical record. 10. Click the Download Summary menu link to download a portable copy of your medical information. If you have questions, please visit the Frequently Asked Questions section of the [employer] website. Remember, [employer] is NOT to be used for urgent needs. For medical emergencies, dial 911. Now available from your iPhone and Android! Please provide this summary of care documentation to your next provider. Your primary care clinician is listed as Iglesia Farrar. If you have any questions after today's visit, please call [phone].

## 2018-03-05 NOTE — PROGRESS NOTES
Mr. Miguel Ángel Marroquin has a reminder for a \"due or due soon\" health maintenance. I have asked that he contact his primary care provider for follow-up on this health maintenance.
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
## 2018-03-05 NOTE — PROGRESS NOTES
[facility]  OFFICE PROCEDURE PROGRESS NOTE        Chart reviewed for the following:   Marva PETTIT LPN, have reviewed the History, Physical and updated the Allergic reactions for [place] performed immediately prior to start of procedure:   Scott Sloan LPN, have performed the following reviews on Edna Dyer prior to the start of the procedure:            * Patient was identified by name and date of birth   * Agreement on procedure being performed was verified  * Risks and Benefits explained to the patient  * Procedure site verified and marked as necessary  * Patient was positioned for comfort  * Consent was signed and verified     Time: 9:28AM      Date of procedure: 3/5/2018    Procedure performed by:  Noemy Barron MD    Provider assisted by: Marcel Villalta LPN    Patient assisted by: self    How tolerated by patient: tolerated the procedure well with no complications    Post Procedural Pain Scale: 0 - No Hurt    Comments: none    Patient verbalized understanding of procedure and post procedure instructions.

## 2018-03-05 NOTE — PATIENT INSTRUCTIONS
Bacillus of [address] (Inside the bladder)   Bacillus of [place]met[place] and Monica (ba-SILL-us of Vasu-met and SHELL-in)  Treats or prevents bladder cancer, and used to prevent certain bladder tumors. Also called Bacillus Calmette-Monica (or BCG). Brand Name(s): Thergeovany Lynn BCG   There may be other brand names for this medicine. When This Medicine Should Not Be Used: You should not use this medicine if your immune system is weak due to other medicines or illness. You should not use this medicine if you have a fever (unless your doctor knows the cause) or if you have a bladder infection. You may not be able to receive this medicine if you have active tuberculosis, or blood in your urine. How to Use This Medicine:   Liquid  · Do not drink fluids for 4 hours before your treatment. Empty your bladder before you receive this medicine. · This medicine is given by running the liquid through a tube (catheter) into your bladder. The tube is then taken out, and you will hold the medicine in your bladder for two hours. · While this medicine is in the bladder, you should lie on your back, stomach, and each side for 15 minutes in each position. · You can get up after the first hour, but hold the medicine in your bladder for 1 more hour. If you are unable to hold it, tell your doctor or nurse. · Treatment with BCG may be given once a week for 6 weeks and approximately each month afterward for 6 to 12 months, or as your doctor orders. · Drink plenty of water during each treatment. · After each treatment, all patients (men and women) should sit down to urinate. · Any time you urinate in the first 6 hours after treatment, disinfect the urine by adding an equal amount (usually about 8 ounces, or 1 cup) of household bleach to the toilet bowl and letting it stand for 15 minutes before flushing. · A nurse or other health provider will give you this medicine.   Drugs and Foods to Avoid:   Ask your doctor or pharmacist before using any other medicine, including over-the-counter medicines, vitamins, and herbal products. · Make sure your doctor knows if you are also using a medicine to treat an infection or tuberculosis. Some tuberculosis medicines are ethambutol, isoniazid, and rifampin. Medicines to treat an infection (antibiotics) include amoxicillin, azithromycin, penicillin, Augmentin®, Bactrim®, and Cipro®. · Tell your doctor if you are using any medicine that weakens your immune system, such as steroids or cancer treatments. Some steroids are dexamethasone, prednisolone, prednisone, and Medrol®. Warnings While Using This Medicine:   · Before you begin your treatment, make sure your doctor knows if you are pregnant or breast feeding. Do not get pregnant while you are being treated with this medicine. · Tell your doctor if you have any changes in urinary habits after treatment with this medicine. Changes include urinating more or less than usual or having trouble urinating. · Some people develop infections after receiving this medicine. Call your doctor right away if you start to have flu-like symptoms. These symptoms include chills, weakness, and fever (103 degrees or higher) that lasts longer than 72 hours. · Your doctor will do lab tests at regular visits to check on the effects of this medicine. Keep all appointments. Possible Side Effects While Using This Medicine:   Call your doctor right away if you notice any of these side effects:  · Blood in your urine. · Burning or pain when you urinate. · Fever (103 degrees F or higher), chills, or extreme tiredness. · Severe skin rash. · Uncontrollable shaking or trembling. · Urinating more than usual, or feeling like you have to urinate more often. If you notice these less serious side effects, talk with your doctor:   · Cough. · Mild skin rash. · Nausea or vomiting. · Pain in your joints.   If you notice other side effects that you think are caused by this medicine, tell your doctor. Call your doctor for medical advice about side effects. You may report side effects to FDA at 0-398-GHT-1469  © 2017 [facility] Information is for End User's use only and may not be sold, redistributed or otherwise used for commercial purposes. The above information is an  only. It is not intended as medical advice for individual conditions or treatments. Talk to your doctor, nurse or pharmacist before following any medical regimen to see if it is safe and effective for you.

## 2018-03-06 LAB
BILIRUB UR QL STRIP: NEGATIVE
GLUCOSE UR-MCNC: NEGATIVE MG/DL
KETONES P FAST UR STRIP-MCNC: NEGATIVE MG/DL
PH UR STRIP: 6 [PH] (ref 4.6–8)
PROT UR QL STRIP: NORMAL
SP GR UR STRIP: 1.02 (ref 1–1.03)
UA UROBILINOGEN AMB POC: NORMAL (ref 0.2–1)
URINALYSIS CLARITY POC: CLEAR
URINALYSIS COLOR POC: YELLOW
URINE BLOOD POC: NORMAL
URINE LEUKOCYTES POC: NORMAL
URINE NITRITES POC: NEGATIVE

## 2018-03-13 ENCOUNTER — OFFICE VISIT (OUTPATIENT)
Dept: UROLOGY | Age: 58
End: 2018-03-13

## 2018-03-13 VITALS
BODY MASS INDEX: 31.02 KG/M2 | HEART RATE: 58 BPM | DIASTOLIC BLOOD PRESSURE: 83 MMHG | OXYGEN SATURATION: 98 % | WEIGHT: 193 LBS | SYSTOLIC BLOOD PRESSURE: 139 MMHG | HEIGHT: 66 IN

## 2018-03-13 DIAGNOSIS — C67.9 MALIGNANT NEOPLASM OF URINARY BLADDER, UNSPECIFIED SITE (HCC): Primary | ICD-10-CM

## 2018-03-13 RX ORDER — NITROFURANTOIN 25; 75 MG/1; MG/1
100 CAPSULE ORAL 2 TIMES DAILY
Qty: 10 CAP | Refills: 0 | Status: SHIPPED | OUTPATIENT
Start: 2018-03-13 | End: 2018-03-18
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
## 2018-03-13 NOTE — MR AVS SNAPSHOT
[facility] [address] [address] 
[phone] Patient: Brandee Snowden MRN: [id number] RLN:[id number] Visit Information Date & Time Provider Department Dept. Phone Encounter #  
 3/13/2018  9:15 AM [place] [address] [facility] [phone] [id number] Your Appointments 3/20/2018  9:15 AM  
PROCEDURE with Heidi Egan MD  
[facility] Appt Note: BCG #3; No Auth Required per Betsey Munoz at ins call [facility]#[id number]; .  
 [address] [facility] [address] [address]  
[phone] [address] [facility] [address] Upcoming Health Maintenance Date Due Hepatitis C Screening 1960 Pneumococcal 19-64 Highest Risk (1 of 3 - PCV13) 5/4/1981 DTaP/Tdap/Td series (1 - Tdap) 5/4/1983 FOBT Q 1 YEAR AGE 50-75 5/4/2012 Influenza Age 5 to Adult 8/1/2017 Allergies as of 3/13/2018  Review Complete On: 3/13/2018 By: Daniel Puri LPN No Known Allergies Current Immunizations  Never Reviewed No immunizations on file. Not reviewed this visit You Were Diagnosed With   
  
 Codes Comments Malignant neoplasm of urinary bladder, unspecified site [facility])    -  Primary ICD-10-CM: C67.9 ICD-9-CM: 188. 9 Vitals BP Pulse Height(growth percentile) Weight(growth percentile) SpO2 BMI  
 139/83 (BP 1 Location: Left arm, BP Patient Position: Sitting) (!) 58 5' 6\" (1.676 m) 193 lb (87.5 kg) 98% 31.15 kg/m2 Smoking Status Current Every Day Smoker Vitals History BMI and BSA Data Body Mass Index Body Surface Area  
 31.15 kg/m 2 2.02 m 2 Preferred Pharmacy Pharmacy Name Phone [address], [address] [place] [address] [phone] Your Updated Medication List  
  
   
 This list is accurate as of 3/13/18  9:48 AM.  Always use your most recent med list.  
  
  
  
  
 gabapentin 800 mg tablet Commonly known as:  NEURONTIN Take 800 mg by mouth three (3) times daily. losartan 100 mg tablet Commonly known as:  COZAAR Take 100 mg by mouth daily. meloxicam 15 mg tablet Commonly known as:  MOBIC Take 1 tab daily or 1/2 tab twice daily as needed pain with food. methocarbamol 750 mg tablet Commonly known as:  ROBAXIN Take 1 Tab by mouth four (4) times daily as needed. Take as needed for muscle spasms  
  
 nicotine 14 mg/24 hr patch Commonly known as:  NICODERM CQ  
APPLY 1 PATCH TOPICALLY EVERY 24 HOURS FOR 6 WEEKS  
  
 nitrofurantoin (macrocrystal-monohydrate) 100 mg capsule Commonly known as:  MACROBID Take 1 Cap by mouth two (2) times a day for 5 days. Indications: BACTERIAL URINARY TRACT INFECTION  
  
 * ANA BCG 50 mg injection Generic drug:  BCG live 50 mg by IntraVESical route once. * BCG live 50 mg injection Commonly known as:  ANA 50 mg by IntraVESical route once for 1 dose. 1 mg/mL  
  
 valACYclovir 500 mg tablet Commonly known as:  VALTREX * Notice: This list has 2 medication(s) that are the same as other medications prescribed for you. Read the directions carefully, and ask your doctor or other care provider to review them with you. Prescriptions Sent to Pharmacy Refills  
 nitrofurantoin, macrocrystal-monohydrate, (MACROBID) 100 mg capsule 0 Sig: Take 1 Cap by mouth two (2) times a day for 5 days. Indications: BACTERIAL URINARY TRACT INFECTION Class: Normal  
 Pharmacy: [id number] [place] [address], [facility] Ph #: [phone] Route: Oral  
  
We Performed the Following AMB POC URINALYSIS DIP STICK AUTO W/O MICRO [75101 CPT(R)] BCG LIVE INTRAVESICAL VAC [ [facility]] MA INSTILL ANTICANCER AGENT IN BLADDER [id number] CPT(R)] Patient Instructions Bacillus of [address] (Inside the bladder) Bacillus of [place] and [place] (ba-SILL-us of Vasu-met and SHELL-in) Treats or prevents bladder cancer, and used to prevent certain bladder tumors. Also called Bacillus Calmette-Monica (or BCG). Brand Name(s): [place] BCG There may be other brand names for this medicine. When This Medicine Should Not Be Used: You should not use this medicine if your immune system is weak due to other medicines or illness. You should not use this medicine if you have a fever (unless your doctor knows the cause) or if you have a bladder infection. You may not be able to receive this medicine if you have active tuberculosis, or blood in your urine. How to Use This Medicine:  
Liquid · Do not drink fluids for 4 hours before your treatment. Empty your bladder before you receive this medicine. · This medicine is given by running the liquid through a tube (catheter) into your bladder. The tube is then taken out, and you will hold the medicine in your bladder for two hours. · While this medicine is in the bladder, you should lie on your back, stomach, and each side for 15 minutes in each position. · You can get up after the first hour, but hold the medicine in your bladder for 1 more hour. If you are unable to hold it, tell your doctor or nurse. · Treatment with BCG may be given once a week for 6 weeks and approximately each month afterward for 6 to 12 months, or as your doctor orders. · Drink plenty of water during each treatment. · After each treatment, all patients (men and women) should sit down to urinate. · Any time you urinate in the first 6 hours after treatment, disinfect the urine by adding an equal amount (usually about 8 ounces, or 1 cup) of household bleach to the toilet bowl and letting it stand for 15 minutes before flushing. · A nurse or other health provider will give you this medicine. Drugs and Foods to Avoid: Ask your doctor or pharmacist before using any other medicine, including over-the-counter medicines, vitamins, and herbal products. · Make sure your doctor knows if you are also using a medicine to treat an infection or tuberculosis. Some tuberculosis medicines are ethambutol, isoniazid, and rifampin. Medicines to treat an infection (antibiotics) include amoxicillin, azithromycin, penicillin, Augmentin®, Bactrim®, and Cipro®. · Tell your doctor if you are using any medicine that weakens your immune system, such as steroids or cancer treatments. Some steroids are dexamethasone, prednisolone, prednisone, and Medrol®. Warnings While Using This Medicine: · Before you begin your treatment, make sure your doctor knows if you are pregnant or breast feeding. Do not get pregnant while you are being treated with this medicine. · Tell your doctor if you have any changes in urinary habits after treatment with this medicine. Changes include urinating more or less than usual or having trouble urinating. · Some people develop infections after receiving this medicine. Call your doctor right away if you start to have flu-like symptoms. These symptoms include chills, weakness, and fever (103 degrees or higher) that lasts longer than 72 hours. · Your doctor will do lab tests at regular visits to check on the effects of this medicine. Keep all appointments. Possible Side Effects While Using This Medicine:  
Call your doctor right away if you notice any of these side effects: · Blood in your urine. · Burning or pain when you urinate. · Fever (103 degrees F or higher), chills, or extreme tiredness. · Severe skin rash. · Uncontrollable shaking or trembling. · Urinating more than usual, or feeling like you have to urinate more often. If you notice these less serious side effects, talk with your doctor: · Cough. · Mild skin rash. · Nausea or vomiting. · Pain in your joints. If you notice other side effects that you think are caused by this medicine, tell your doctor. Call your doctor for medical advice about side effects. You may report side effects to FDA at 6-173-DPC-7457 © 2017 [facility] Information is for End User's use only and may not be sold, redistributed or otherwise used for commercial purposes. The above information is an  only. It is not intended as medical advice for individual conditions or treatments. Talk to your doctor, nurse or pharmacist before following any medical regimen to see if it is safe and effective for you. Introducing [facility]! [facility] introduces [employer] patient portal. Now you can access parts of your medical record, email your doctor's office, and request medication refills online. 1. In your internet browser, go to [email or web address] 2. Click on the First Time User? Click Here link in the Sign In box. You will see the New Member Sign Up page. 3. Enter your [employer] Access Code exactly as it appears below. You will not need to use this code after youve completed the sign-up process. If you do not sign up before the expiration date, you must request a new code. · [employer] Access Code: [email or web address] Expires: 6/3/2018  9:41 AM 
 
4. Enter the last four digits of your Social Security Number (xxxx) and Date of Birth (mm/dd/yyyy) as indicated and click Submit. You will be taken to the next sign-up page. 5. Create a Th[employer] ID. This will be your [employer] login ID and cannot be changed, so think of one that is secure and easy to remember. 6. Create a [employer] password. You can change your password at any time. 7. Enter your Password Reset Question and Answer. This can be used at a later time if you forget your password. 8. Enter your e-mail address. You will receive e-mail notification when new information is available in [address] 9. Click Sign Up. You can now view and download portions of your medical record. 10. Click the Download Summary menu link to download a portable copy of your medical information. If you have questions, please visit the Frequently Asked Questions section of the [employer] website. Remember, [employer] is NOT to be used for urgent needs. For medical emergencies, dial 911. Now available from your iPhone and Android! Please provide this summary of care documentation to your next provider. Your primary care clinician is listed as Corinna Libman. If you have any questions after today's visit, please call [phone].

## 2018-03-13 NOTE — PATIENT INSTRUCTIONS
Bacillus of [address] (Inside the bladder)   Bacillus of [place] (ba-SILL-us of Vasu-met and SHELL-in)  Treats or prevents bladder cancer, and used to prevent certain bladder tumors. Also called Bacillus Calmette-Monica (or BCG). Brand Name(s): Thergeovany Lynn BCG   There may be other brand names for this medicine. When This Medicine Should Not Be Used: You should not use this medicine if your immune system is weak due to other medicines or illness. You should not use this medicine if you have a fever (unless your doctor knows the cause) or if you have a bladder infection. You may not be able to receive this medicine if you have active tuberculosis, or blood in your urine. How to Use This Medicine:   Liquid  · Do not drink fluids for 4 hours before your treatment. Empty your bladder before you receive this medicine. · This medicine is given by running the liquid through a tube (catheter) into your bladder. The tube is then taken out, and you will hold the medicine in your bladder for two hours. · While this medicine is in the bladder, you should lie on your back, stomach, and each side for 15 minutes in each position. · You can get up after the first hour, but hold the medicine in your bladder for 1 more hour. If you are unable to hold it, tell your doctor or nurse. · Treatment with BCG may be given once a week for 6 weeks and approximately each month afterward for 6 to 12 months, or as your doctor orders. · Drink plenty of water during each treatment. · After each treatment, all patients (men and women) should sit down to urinate. · Any time you urinate in the first 6 hours after treatment, disinfect the urine by adding an equal amount (usually about 8 ounces, or 1 cup) of household bleach to the toilet bowl and letting it stand for 15 minutes before flushing. · A nurse or other health provider will give you this medicine.   Drugs and Foods to Avoid:   Ask your doctor or pharmacist before using any other medicine, including over-the-counter medicines, vitamins, and herbal products. · Make sure your doctor knows if you are also using a medicine to treat an infection or tuberculosis. Some tuberculosis medicines are ethambutol, isoniazid, and rifampin. Medicines to treat an infection (antibiotics) include amoxicillin, azithromycin, penicillin, Augmentin®, Bactrim®, and Cipro®. · Tell your doctor if you are using any medicine that weakens your immune system, such as steroids or cancer treatments. Some steroids are dexamethasone, prednisolone, prednisone, and Medrol®. Warnings While Using This Medicine:   · Before you begin your treatment, make sure your doctor knows if you are pregnant or breast feeding. Do not get pregnant while you are being treated with this medicine. · Tell your doctor if you have any changes in urinary habits after treatment with this medicine. Changes include urinating more or less than usual or having trouble urinating. · Some people develop infections after receiving this medicine. Call your doctor right away if you start to have flu-like symptoms. These symptoms include chills, weakness, and fever (103 degrees or higher) that lasts longer than 72 hours. · Your doctor will do lab tests at regular visits to check on the effects of this medicine. Keep all appointments. Possible Side Effects While Using This Medicine:   Call your doctor right away if you notice any of these side effects:  · Blood in your urine. · Burning or pain when you urinate. · Fever (103 degrees F or higher), chills, or extreme tiredness. · Severe skin rash. · Uncontrollable shaking or trembling. · Urinating more than usual, or feeling like you have to urinate more often. If you notice these less serious side effects, talk with your doctor:   · Cough. · Mild skin rash. · Nausea or vomiting. · Pain in your joints.   If you notice other side effects that you think are caused by this medicine, tell your doctor. Call your doctor for medical advice about side effects. You may report side effects to FDA at 5-609-RRI-2346  © 2017 [address] Information is for End User's use only and may not be sold, redistributed or otherwise used for commercial purposes. The above information is an  only. It is not intended as medical advice for individual conditions or treatments. Talk to your doctor, nurse or pharmacist before following any medical regimen to see if it is safe and effective for you.

## 2018-03-13 NOTE — PROGRESS NOTES
[facility]  OFFICE PROCEDURE PROGRESS NOTE        Chart reviewed for the following:   Patric PETTIT LPN, have reviewed the History, Physical and updated the Allergic reactions for [place] performed immediately prior to start of procedure:   Colt Vincent LPN, have performed the following reviews on Asia Mcgee prior to the start of the procedure:            * Patient was identified by name and date of birth   * Agreement on procedure being performed was verified  * Risks and Benefits explained to the patient  * Procedure site verified and marked as necessary  * Patient was positioned for comfort  * Consent was signed and verified     Time: 9:15AM      Date of procedure: 3/13/2018    Procedure performed by:  Adolfo Gilman MD    Provider assisted by: Leah Suarez LPN    Patient assisted by: self    How tolerated by patient: tolerated the procedure well with no complications    Post Procedural Pain Scale: 0 - No Hurt    Comments: none    Patient verbalized understanding of procedure and post procedure instructions.

## 2018-03-13 NOTE — PROCEDURES
Under satisfactory skin prep the coudé catheter was inserted and bladder was drained of about 100 cc of clear urine. 50 cc of freshly prepared BCG was then instilled into the bladder in the usual fashion. He will be given some Macrobid post instillation this time and he will return in a week for his third installation of BCG.

## 2018-03-13 NOTE — PROGRESS NOTES
Mr. Roseanne Medina has a reminder for a \"due or due soon\" health maintenance. I have asked that he contact his primary care provider for follow-up on this health maintenance.

## 2018-03-20 ENCOUNTER — OFFICE VISIT (OUTPATIENT)
Dept: UROLOGY | Age: 58
End: 2018-03-20

## 2018-03-20 VITALS
HEART RATE: 54 BPM | OXYGEN SATURATION: 98 % | WEIGHT: 194 LBS | HEIGHT: 66 IN | DIASTOLIC BLOOD PRESSURE: 86 MMHG | SYSTOLIC BLOOD PRESSURE: 145 MMHG | BODY MASS INDEX: 31.18 KG/M2

## 2018-03-20 DIAGNOSIS — R82.81 PYURIA: ICD-10-CM

## 2018-03-20 DIAGNOSIS — C67.9 MALIGNANT NEOPLASM OF URINARY BLADDER, UNSPECIFIED SITE (HCC): Primary | ICD-10-CM

## 2018-03-20 RX ORDER — GENTAMICIN SULFATE 40 MG/ML
80 INJECTION, SOLUTION INTRAMUSCULAR; INTRAVENOUS ONCE
Qty: 1 VIAL | Refills: 0
Start: 2018-03-20 | End: 2018-03-20

## 2018-03-20 NOTE — PATIENT INSTRUCTIONS
Bacillus of [address] (Inside the bladder)   Bacillus of [place] (ba-SILL-us of Vasu-met and SHELL-in)  Treats or prevents bladder cancer, and used to prevent certain bladder tumors. Also called Bacillus Calmette-Monica (or BCG). Brand Name(s): Thergeovany Lynn BCG   There may be other brand names for this medicine. When This Medicine Should Not Be Used: You should not use this medicine if your immune system is weak due to other medicines or illness. You should not use this medicine if you have a fever (unless your doctor knows the cause) or if you have a bladder infection. You may not be able to receive this medicine if you have active tuberculosis, or blood in your urine. How to Use This Medicine:   Liquid  · Do not drink fluids for 4 hours before your treatment. Empty your bladder before you receive this medicine. · This medicine is given by running the liquid through a tube (catheter) into your bladder. The tube is then taken out, and you will hold the medicine in your bladder for two hours. · While this medicine is in the bladder, you should lie on your back, stomach, and each side for 15 minutes in each position. · You can get up after the first hour, but hold the medicine in your bladder for 1 more hour. If you are unable to hold it, tell your doctor or nurse. · Treatment with BCG may be given once a week for 6 weeks and approximately each month afterward for 6 to 12 months, or as your doctor orders. · Drink plenty of water during each treatment. · After each treatment, all patients (men and women) should sit down to urinate. · Any time you urinate in the first 6 hours after treatment, disinfect the urine by adding an equal amount (usually about 8 ounces, or 1 cup) of household bleach to the toilet bowl and letting it stand for 15 minutes before flushing. · A nurse or other health provider will give you this medicine.   Drugs and Foods to Avoid:   Ask your doctor or pharmacist before using any other medicine, including over-the-counter medicines, vitamins, and herbal products. · Make sure your doctor knows if you are also using a medicine to treat an infection or tuberculosis. Some tuberculosis medicines are ethambutol, isoniazid, and rifampin. Medicines to treat an infection (antibiotics) include amoxicillin, azithromycin, penicillin, Augmentin®, Bactrim®, and Cipro®. · Tell your doctor if you are using any medicine that weakens your immune system, such as steroids or cancer treatments. Some steroids are dexamethasone, prednisolone, prednisone, and Medrol®. Warnings While Using This Medicine:   · Before you begin your treatment, make sure your doctor knows if you are pregnant or breast feeding. Do not get pregnant while you are being treated with this medicine. · Tell your doctor if you have any changes in urinary habits after treatment with this medicine. Changes include urinating more or less than usual or having trouble urinating. · Some people develop infections after receiving this medicine. Call your doctor right away if you start to have flu-like symptoms. These symptoms include chills, weakness, and fever (103 degrees or higher) that lasts longer than 72 hours. · Your doctor will do lab tests at regular visits to check on the effects of this medicine. Keep all appointments. Possible Side Effects While Using This Medicine:   Call your doctor right away if you notice any of these side effects:  · Blood in your urine. · Burning or pain when you urinate. · Fever (103 degrees F or higher), chills, or extreme tiredness. · Severe skin rash. · Uncontrollable shaking or trembling. · Urinating more than usual, or feeling like you have to urinate more often. If you notice these less serious side effects, talk with your doctor:   · Cough. · Mild skin rash. · Nausea or vomiting. · Pain in your joints.   If you notice other side effects that you think are caused by this medicine, tell your doctor. Call your doctor for medical advice about side effects. You may report side effects to FDA at 1-433-UQF-4604  © 2017 [address] Information is for End User's use only and may not be sold, redistributed or otherwise used for commercial purposes. The above information is an  only. It is not intended as medical advice for individual conditions or treatments. Talk to your doctor, nurse or pharmacist before following any medical regimen to see if it is safe and effective for you.

## 2018-03-20 NOTE — PROGRESS NOTES
Per Dr. Marietta Cabrera Gentamicin 80 mg given IM in right gluteus. Patient tolerated well. RBV. Per Dr. Marietta Cabrera Patient to have urine sent for culture. RBV Per Dr. Marietta Cabrera order placed for BCG that was instilled into bladder today in office by Dr. Marietta Cabrera. Patient tolerated well.

## 2018-03-20 NOTE — PROGRESS NOTES
Mr. Fisher Crows has a reminder for a \"due or due soon\" health maintenance. I have asked that he contact his primary care provider for follow-up on this health maintenance.

## 2018-03-20 NOTE — PROGRESS NOTES
[facility]  OFFICE PROCEDURE PROGRESS NOTE        Chart reviewed for the following:   Rachael PETTIT LPN, have reviewed the History, Physical and updated the Allergic reactions for [place] performed immediately prior to start of procedure:   Lita Silvestre LPN, have performed the following reviews on Jarvis Lombard prior to the start of the procedure:            * Patient was identified by name and date of birth   * Agreement on procedure being performed was verified  * Risks and Benefits explained to the patient  * Procedure site verified and marked as necessary  * Patient was positioned for comfort  * Consent was signed and verified     Time: 9:30AM      Date of procedure: 3/20/2018    Procedure performed by:  Samantha Mcleod MD    Provider assisted by: Federico Fleming LPN    Patient assisted by: self    How tolerated by patient: tolerated the procedure well with no complications    Post Procedural Pain Scale: 0 - No Hurt    Comments: none    Patient verbalized understanding of procedure and post procedure instructions.

## 2018-03-20 NOTE — MR AVS SNAPSHOT
[address] [facility] [place] [address] [address] 
[phone] Patient: Carola Salcedo MRN: [id number] JBT:[birth year] Visit Information Date & Time Provider Department Dept. Phone Encounter #  
 3/20/2018  9:15 AM Aldo Carr [phone] Your Appointments 4/24/2018  9:00 AM  
PROCEDURE with Kashmir Jacobo MD  
[facility]) Appt Note: cysto after [address] [place] [address] [address]  
[phone] [phone]  
  
    
 6/12/2018  9:00 AM  
PROCEDURE with Kashmir Jacobo MD  
[facility] Appt Note: BCG #1  
 [address] [place] [address] [address]  
[phone] 6/19/2018  9:30 AM  
PROCEDURE with Kashmir Jacobo MD  
[facility]) Appt Note: BCG #2  
 [address] [place] [address]  
[phone]  
  
    
 6/26/2018  9:00 AM  
PROCEDURE with Amna Tavarez MD  
[facility] Appt Note: BCG #3  
 [address] [place] [address] [address]  
[phone] [address] [phone]  
  
    
 7/17/2018  9:15 AM  
PROCEDURE with Kashmir Jacobo MD  
[facility]) Appt Note: cysto after [address] [place] [address] [address]  
[phone] Upcoming Health Maintenance Date Due Hepatitis C Screening 1960 Pneumococcal 19-64 Highest Risk (1 of 3 - PCV13) 5/4/1981 DTaP/Tdap/Td series (1 - Tdap) 5/4/1983 FOBT Q 1 YEAR AGE 50-75 5/4/2012 Influenza Age 5 to Adult 8/1/2017 Allergies as of 3/20/2018  Review Complete On: 3/20/2018 By: Kashmir Jacobo MD  
 No Known Allergies Current Immunizations  Never Reviewed No immunizations on file. Not reviewed this visit You Were Diagnosed With   
  
 Codes Comments Malignant neoplasm of urinary bladder, unspecified site [facility])    -  Primary ICD-10-CM: C67.9 ICD-9-CM: 188. 9 Pyuria     ICD-10-CM: N39.0 ICD-9-CM: 791.9 Vitals BP Pulse Height(growth percentile) Weight(growth percentile) SpO2 BMI  
 145/86 (BP 1 Location: Left arm, BP Patient Position: Sitting) (!) 54 5' 6\" (1.676 m) 194 lb (88 kg) 98% 31.31 kg/m2 Smoking Status Current Every Day Smoker Vitals History BMI and BSA Data Body Mass Index Body Surface Area  
 31.31 kg/m 2 2.02 m 2 Preferred Pharmacy Pharmacy Name Phone [address] [place] [address], [address] [place] [address] [phone] Your Updated Medication List  
  
   
This list is accurate as of 3/20/18 10:22 AM.  Always use your most recent med list.  
  
  
  
  
 gabapentin 800 mg tablet Commonly known as:  NEURONTIN Take 800 mg by mouth three (3) times daily. gentamicin 40 mg/mL injection Commonly known as:  GARAMYCIN  
2 mL by IntraMUSCular route once for 1 dose. losartan 100 mg tablet Commonly known as:  COZAAR Take 100 mg by mouth daily. meloxicam 15 mg tablet Commonly known as:  MOBIC Take 1 tab daily or 1/2 tab twice daily as needed pain with food. methocarbamol 750 mg tablet Commonly known as:  ROBAXIN Take 1 Tab by mouth four (4) times daily as needed. Take as needed for muscle spasms  
  
 nicotine 14 mg/24 hr patch Commonly known as:  NICODERM CQ  
APPLY 1 PATCH TOPICALLY EVERY 24 HOURS FOR 6 WEEKS  
  
 ANA BCG 50 mg injection Generic drug:  BCG live 50 mg by IntraVESical route once. valACYclovir 500 mg tablet Commonly known as:  VALTREX We Performed the Following AMB POC URINALYSIS DIP STICK AUTO W/O MICRO [72084 CPT(R)] GARAMYCIN, GENTAMICIN INJECTION <= 80 MG [ [facility]] NH THER/PROPH/DIAG INJECTION, SUBCUT/IM [id number] CPT(R)] Patient Instructions Bacillus of [address] (Inside the bladder) Bacillus of [place] (ba-SILL-us of Vasu-met and SHELL-in) Treats or prevents bladder cancer, and used to prevent certain bladder tumors. Also called Bacillus Calmette-Monica (or BCG). Brand Name(s): Thermarissas Lynn BCG There may be other brand names for this medicine. When This Medicine Should Not Be Used: You should not use this medicine if your immune system is weak due to other medicines or illness. You should not use this medicine if you have a fever (unless your doctor knows the cause) or if you have a bladder infection. You may not be able to receive this medicine if you have active tuberculosis, or blood in your urine. How to Use This Medicine:  
Liquid · Do not drink fluids for 4 hours before your treatment. Empty your bladder before you receive this medicine. · This medicine is given by running the liquid through a tube (catheter) into your bladder. The tube is then taken out, and you will hold the medicine in your bladder for two hours. · While this medicine is in the bladder, you should lie on your back, stomach, and each side for 15 minutes in each position. · You can get up after the first hour, but hold the medicine in your bladder for 1 more hour. If you are unable to hold it, tell your doctor or nurse. · Treatment with BCG may be given once a week for 6 weeks and approximately each month afterward for 6 to 12 months, or as your doctor orders. · Drink plenty of water during each treatment. · After each treatment, all patients (men and women) should sit down to urinate.  
· Any time you urinate in the first 6 hours after treatment, disinfect the urine by adding an equal amount (usually about 8 ounces, or 1 cup) of household bleach to the toilet bowl and letting it stand for 15 minutes before flushing. · A nurse or other health provider will give you this medicine. Drugs and Foods to Avoid: Ask your doctor or pharmacist before using any other medicine, including over-the-counter medicines, vitamins, and herbal products. · Make sure your doctor knows if you are also using a medicine to treat an infection or tuberculosis. Some tuberculosis medicines are ethambutol, isoniazid, and rifampin. Medicines to treat an infection (antibiotics) include amoxicillin, azithromycin, penicillin, Augmentin®, Bactrim®, and Cipro®. · Tell your doctor if you are using any medicine that weakens your immune system, such as steroids or cancer treatments. Some steroids are dexamethasone, prednisolone, prednisone, and Medrol®. Warnings While Using This Medicine: · Before you begin your treatment, make sure your doctor knows if you are pregnant or breast feeding. Do not get pregnant while you are being treated with this medicine. · Tell your doctor if you have any changes in urinary habits after treatment with this medicine. Changes include urinating more or less than usual or having trouble urinating. · Some people develop infections after receiving this medicine. Call your doctor right away if you start to have flu-like symptoms. These symptoms include chills, weakness, and fever (103 degrees or higher) that lasts longer than 72 hours. · Your doctor will do lab tests at regular visits to check on the effects of this medicine. Keep all appointments. Possible Side Effects While Using This Medicine:  
Call your doctor right away if you notice any of these side effects: · Blood in your urine. · Burning or pain when you urinate. · Fever (103 degrees F or higher), chills, or extreme tiredness. · Severe skin rash. · Uncontrollable shaking or trembling. · Urinating more than usual, or feeling like you have to urinate more often. If you notice these less serious side effects, talk with your doctor: · Cough. · Mild skin rash. · Nausea or vomiting. · Pain in your joints. If you notice other side effects that you think are caused by this medicine, tell your doctor. Call your doctor for medical advice about side effects. You may report side effects to FDA at 3-800-LZZ-5650 © 2017 [address] Information is for End User's use only and may not be sold, redistributed or otherwise used for commercial purposes. The above information is an  only. It is not intended as medical advice for individual conditions or treatments. Talk to your doctor, nurse or pharmacist before following any medical regimen to see if it is safe and effective for you. Introducing [facility]! Chastity Li introduces [employer] patient portal. Now you can access parts of your medical record, email your doctor's office, and request medication refills online. 1. In your internet browser, go to [email or web address] 2. Click on the First Time User? Click Here link in the Sign In box. You will see the New Member Sign Up page. 3. Enter your [employer] Access Code exactly as it appears below. You will not need to use this code after youve completed the sign-up process. If you do not sign up before the expiration date, you must request a new code. · [employer] Access Code: [email or web address] Expires: 6/3/2018  9:41 AM 
 
4. Enter the last four digits of your Social Security Number (xxxx) and Date of Birth (mm/dd/yyyy) as indicated and click Submit. You will be taken to the next sign-up page. 5. Create a [employer] ID. This will be your [employer] login ID and cannot be changed, so think of one that is secure and easy to remember. 6. Create a [employer] password. You can change your password at any time. 7. Enter your Password Reset Question and Answer. This can be used at a later time if you forget your password. 8. Enter your e-mail address. You will receive e-mail notification when new information is available in [address] 9. Click Sign Up. You can now view and download portions of your medical record. 10. Click the Download Summary menu link to download a portable copy of your medical information. If you have questions, please visit the Frequently Asked Questions section of the [employer] website. Remember, [employer] is NOT to be used for urgent needs. For medical emergencies, dial 911. Now available from your iPhone and Android! Please provide this summary of care documentation to your next provider. Your primary care clinician is listed as [place]. If you have any questions after today's visit, please call [phone].
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
## 2018-03-22 LAB — BACTERIA UR CULT: NO GROWTH

## 2018-04-24 ENCOUNTER — OFFICE VISIT (OUTPATIENT)
Dept: UROLOGY | Age: 58
End: 2018-04-24

## 2018-04-24 VITALS
TEMPERATURE: 97.4 F | HEART RATE: 58 BPM | BODY MASS INDEX: 30.53 KG/M2 | HEIGHT: 66 IN | DIASTOLIC BLOOD PRESSURE: 90 MMHG | WEIGHT: 190 LBS | SYSTOLIC BLOOD PRESSURE: 130 MMHG | OXYGEN SATURATION: 98 %

## 2018-04-24 DIAGNOSIS — C67.9 MALIGNANT NEOPLASM OF URINARY BLADDER, UNSPECIFIED SITE (HCC): Primary | ICD-10-CM

## 2018-04-24 LAB
BILIRUB UR QL STRIP: NEGATIVE
GLUCOSE UR-MCNC: NEGATIVE MG/DL
KETONES P FAST UR STRIP-MCNC: NEGATIVE MG/DL
PH UR STRIP: 7 [PH] (ref 4.6–8)
PROT UR QL STRIP: NEGATIVE
SP GR UR STRIP: 1.01 (ref 1–1.03)
UA UROBILINOGEN AMB POC: NORMAL (ref 0.2–1)
URINALYSIS CLARITY POC: CLEAR
URINALYSIS COLOR POC: YELLOW
URINE BLOOD POC: NORMAL
URINE LEUKOCYTES POC: NORMAL
URINE NITRITES POC: NEGATIVE

## 2018-04-24 NOTE — PROGRESS NOTES
[facility]  OFFICE PROCEDURE PROGRESS NOTE        Chart reviewed for the following:   IFederico, have reviewed the History, Physical and updated the Allergic reactions for [place] performed immediately prior to start of procedure:   Rc Olivier, have performed the following reviews on Shayla Fitzpatrick prior to the start of the procedure:            * Patient was identified by name and date of birth   * Agreement on procedure being performed was verified  * Risks and Benefits explained to the patient  * Procedure site verified and marked as necessary  * Patient was positioned for comfort  * Consent was signed and verified     Time: 9:18 AM      Date of procedure: 4/24/2018    Procedure performed by:  Nikole Oviedo MD    Provider assisted by: Federico Bhatt MA    Patient assisted by: self    How tolerated by patient: tolerated the procedure well with no complications    Post Procedural Pain Scale: 0 - No Hurt    Comments: nonePatient verbalized understanding of procedure and post procedure instructions.

## 2018-04-24 NOTE — MR AVS SNAPSHOT
[address] [facility] [place] [address] [address] 
[phone] Patient: Larissa Vazquez MRN: [id number] Visit Information Date & Time Provider Department Dept. Phone Encounter #  
 2018  9:00 AM [facility] [phone] Your Appointments 2018  9:00 AM  
PROCEDURE with Haylee Garza MD  
[facility] [address] [place] [address]) Appt Note: BCG #1  
 [address] [place] [address] [address]  
[phone] [phone]  
  
    
 2018  9:30 AM  
PROCEDURE with Haylee Garza MD  
[facility] [address] [place] [address]) Appt Note: BCG #2  
 [address] [place] [address]  
[phone]  
  
    
 2018  9:00 AM  
PROCEDURE with Eileen Long MD  
[facility] [address] [place] [address]) Appt Note: BCG #3  
 [address] [place] [address] [address]  
[phone] [phone]  
  
    
 2018  9:15 AM  
PROCEDURE with Haylee Garza MD  
[facility] [address] [place] [address]) Appt Note: cysto after [address] [place] [address] [address]  
[phone] Upcoming Health Maintenance Date Due Hepatitis C Screening 1960 Pneumococcal 19-64 Highest Risk (1 of 3 - PCV13) 1981 DTaP/Tdap/Td series (1 - Tdap) 1983 FOBT Q 1 YEAR AGE 50-75 2012 Influenza Age 5 to Adult 2017 MEDICARE YEARLY EXAM 3/28/2018 Allergies as of 2018  Review Complete On: 2018 By: Haylee Garza MD  
 No Known Allergies Current Immunizations  Never Reviewed No immunizations on file. Not reviewed this visit You Were Diagnosed With   
  
 Codes Comments Malignant neoplasm of urinary bladder, unspecified site [facility])    -  Primary ICD-10-CM: C67.9 ICD-9-CM: 188. 9 Vitals BP Pulse Temp Height(growth percentile) Weight(growth percentile) SpO2  
 130/90 (BP 1 Location: Left arm, BP Patient Position: Sitting) (!) 58 97.4 °F (36.3 °C) (Oral) 5' 6\" (1.676 m) 190 lb (86.2 kg) 98% BMI Smoking Status 30.67 kg/m2 Current Every Day Smoker Vitals History BMI and BSA Data Body Mass Index Body Surface Area  
 30.67 kg/m 2 2 m 2 Preferred Pharmacy Pharmacy Name Phone [address], [address] [place] [address] [phone] Your Updated Medication List  
  
   
This list is accurate as of 4/24/18  9:14 AM.  Always use your most recent med list.  
  
  
  
  
 gabapentin 800 mg tablet Commonly known as:  NEURONTIN Take 800 mg by mouth three (3) times daily. losartan 100 mg tablet Commonly known as:  COZAAR Take 100 mg by mouth daily. meloxicam 15 mg tablet Commonly known as:  MOBIC Take 1 tab daily or 1/2 tab twice daily as needed pain with food. methocarbamol 750 mg tablet Commonly known as:  ROBAXIN Take 1 Tab by mouth four (4) times daily as needed. Take as needed for muscle spasms  
  
 nicotine 14 mg/24 hr patch Commonly known as:  NICODERM CQ  
APPLY 1 PATCH TOPICALLY EVERY 24 HOURS FOR 6 WEEKS  
  
 ANA BCG 50 mg injection Generic drug:  BCG live 50 mg by IntraVESical route once. valACYclovir 500 mg tablet Commonly known as:  VALTREX We Performed the Following AMB POC URINALYSIS DIP STICK AUTO W/O MICRO [15795 CPT(R)] CYSTOSCOPY [90382 CPT(R)] Patient Instructions Cystoscopy: Care Instructions Your Care Instructions Cystoscopy is a test. It uses a thin, lighted tube called a cystoscope to see the inside of the bladder and the urethra. The urethra is the tube that carries urine out of the body. This test is helpful because it lets your doctor see areas of your bladder and urethra that are hard to see on X-rays. It can help your doctor find bladder stones, tumors, bleeding, and infection. During this test, your doctor also can take tissue and urine samples. And if your doctor finds small stones or growths, he or she can remove them. In most cases the scope is in the bladder for less than 10 minutes. But the entire test may take 45 minutes or longer. You will probably get local anesthesia. This numbs a small part of your body. Or you may get spinal anesthesia, which numbs more of your body. Once in a while, doctors use general anesthesia. It makes you sleep during surgery. If you get a local anesthetic, you may be able to get up right after the test. But if you had spinal or general anesthesia, you will stay in the recovery room until you are able to walk or you have feeling again in your lower body. This usually takes about an hour. Your doctor may be able to tell you some of the results right after the test. But the complete results may take several days. Follow-up care is a key part of your treatment and safety. Be sure to make and go to all appointments, and call your doctor if you are having problems. It's also a good idea to know your test results and keep a list of the medicines you take. How can you care for yourself at home? Before the test 
· If you are having a local anesthetic, you can eat and drink before the test. 
· If you are having a spinal or general anesthetic, do not eat or drink anything for at least 8 hours before the test. Tell your doctor what medicines you take. · If you are not staying overnight in the hospital, make sure you have someone who can drive you home after the test. 
After the test 
· If your doctor prescribed antibiotics, take them as directed. Do not stop taking them just because you feel better. You need to take the full course of antibiotics. · You may have some burning when you urinate for a day or two after the test. You may feel better if you drink more fluids. This may also help prevent an infection. · Your urine may have a pinkish color for a few days after the test. 
When should you call for help? Call your doctor now or seek immediate medical care if: 
? · Your urine is still red or you see blood clots after you have urinated several times. ? · You cannot pass urine 8 hours after the test.  
? · You get a fever or chills. ? · You have pain in your belly or your back just below your rib cage. This is also called flank pain. ? Watch closely for changes in your health, and be sure to contact your doctor if: 
? · You have pain or burning when you urinate. A burning sensation is normal for a day or two after the test. But call if it does not get better. ? · You have a frequent urge to urinate but can pass only small amounts of urine. ? · Your urine is pink, red, or cloudy or smells bad. It is normal for the urine to have a pinkish color for a few days after the test. But call if it does not get better. ? · You do not get better as expected. Where can you learn more? Go to http://tramaine-alis.info/. Enter I741 in the search box to learn more about \"Cystoscopy: Care Instructions. \" Current as of: May 12, 2017 Content Version: 11.4 © 7757-7174 Seat [employer]A. Care instructions adapted under license by [employer] (which disclaims liability or warranty for this information). If you have questions about a medical condition or this instruction, always ask your healthcare professional. [address] any warranty or liability for your use of this information. Introducing [facility]! Nuris Mc introduces [employer] patient portal. Now you can access parts of your medical record, email your doctor's office, and request medication refills online. 1. In your internet browser, go to [email or web address] [employer] 2. Click on the First Time User? Click Here link in the Sign In box. You will see the New Member Sign Up page. 3. Enter your 48domain Access Code exactly as it appears below. You will not need to use this code after youve completed the sign-up process. If you do not sign up before the expiration date, you must request a new code. · 48domain Access Code: [email or web address] Expires: 6/3/2018  9:41 AM 
 
4. Enter the last four digits of your Social Security Number (xxxx) and Date of Birth (mm/dd/yyyy) as indicated and click Submit. You will be taken to the next sign-up page. 5. Create a Cortriumt ID. This will be your 48domain login ID and cannot be changed, so think of one that is secure and easy to remember. 6. Create a 48domain password. You can change your password at any time. 7. Enter your Password Reset Question and Answer. This can be used at a later time if you forget your password. 8. Enter your e-mail address. You will receive e-mail notification when new information is available in [address] 9. Click Sign Up. You can now view and download portions of your medical record. 10. Click the Download Summary menu link to download a portable copy of your medical information. If you have questions, please visit the Frequently Asked Questions section of the 48domain website. Remember, 48domain is NOT to be used for urgent needs. For medical emergencies, dial 911. Now available from your iPhone and Android! Please provide this summary of care documentation to your next provider. Your primary care clinician is listed as Artis Barraza. If you have any questions after today's visit, please call [phone].

## 2018-04-24 NOTE — PATIENT INSTRUCTIONS
Cystoscopy: Care Instructions  Your Care Instructions  Cystoscopy is a test. It uses a thin, lighted tube called a cystoscope to see the inside of the bladder and the urethra. The urethra is the tube that carries urine out of the body. This test is helpful because it lets your doctor see areas of your bladder and urethra that are hard to see on X-rays. It can help your doctor find bladder stones, tumors, bleeding, and infection. During this test, your doctor also can take tissue and urine samples. And if your doctor finds small stones or growths, he or she can remove them. In most cases the scope is in the bladder for less than 10 minutes. But the entire test may take 45 minutes or longer. You will probably get local anesthesia. This numbs a small part of your body. Or you may get spinal anesthesia, which numbs more of your body. Once in a while, doctors use general anesthesia. It makes you sleep during surgery. If you get a local anesthetic, you may be able to get up right after the test. But if you had spinal or general anesthesia, you will stay in the recovery room until you are able to walk or you have feeling again in your lower body. This usually takes about an hour. Your doctor may be able to tell you some of the results right after the test. But the complete results may take several days. Follow-up care is a key part of your treatment and safety. Be sure to make and go to all appointments, and call your doctor if you are having problems. It's also a good idea to know your test results and keep a list of the medicines you take. How can you care for yourself at home? Before the test  · If you are having a local anesthetic, you can eat and drink before the test.  · If you are having a spinal or general anesthetic, do not eat or drink anything for at least 8 hours before the test. Tell your doctor what medicines you take.   · If you are not staying overnight in the hospital, make sure you have someone who can drive you home after the test.  After the test  · If your doctor prescribed antibiotics, take them as directed. Do not stop taking them just because you feel better. You need to take the full course of antibiotics. · You may have some burning when you urinate for a day or two after the test. You may feel better if you drink more fluids. This may also help prevent an infection. · Your urine may have a pinkish color for a few days after the test.  When should you call for help? Call your doctor now or seek immediate medical care if:  ? · Your urine is still red or you see blood clots after you have urinated several times. ? · You cannot pass urine 8 hours after the test.   ? · You get a fever or chills. ? · You have pain in your belly or your back just below your rib cage. This is also called flank pain. ? Watch closely for changes in your health, and be sure to contact your doctor if:  ? · You have pain or burning when you urinate. A burning sensation is normal for a day or two after the test. But call if it does not get better. ? · You have a frequent urge to urinate but can pass only small amounts of urine. ? · Your urine is pink, red, or cloudy or smells bad. It is normal for the urine to have a pinkish color for a few days after the test. But call if it does not get better. ? · You do not get better as expected. Where can you learn more? Go to http://tramaine-alis.info/. Enter V763 in the search box to learn more about \"Cystoscopy: Care Instructions. \"  Current as of: May 12, 2017  Content Version: 11.4  © 6854-6357 [employer]. Care instructions adapted under license by [employer] (which disclaims liability or warranty for this information).  If you have questions about a medical condition or this instruction, always ask your healthcare professional. [address] any warranty or liability for your use of this information.

## 2018-04-24 NOTE — PROGRESS NOTES
The patient is prepared for cystoscopy. He has been counseled regarding the risks which include, but are not limited to, infection, bleeding, injury, failure to diagnose, and possible need for additional procedures. He wishes to proceed, giving his informed consent. Procedure note: The patient is in the slightly inclined supine position where he is prepped and draped in sterile fashion. Lidocaine jelly has been inserted into the urethra and a suitable time has been permitted to elapse for establishment of anesthesia. A flexible cystoscope is inserted and video cystoscopy is carried out with the patient able to observe the monitor. The findings are as follows:  1. Pendulous urethra normal  2. Bulbar urethra normal  3. External sphincter normal  4. Prostate normal for age  11. Bladder neck open and flexible  6. Trigone follow up with ureteral orifice ease that are bilaterally normal.  There is moderate distortion of the left intramural tunnel from the somewhat lateral and cephalad previous resection  7. Bladder wall-normal with no exophytic change. However, near the air bubble just on the posterior dome of the bladder and to the left of the midline there is an area that looks as though there may be 2 small hemangioma side by side. However, I cannot see that this is absolutely not early papillation and should be looked at more carefully with the next cystoscopy-cytology is not done because, if neoplastic, a cytology would be negative for this low grade process    Pertinent observations are pointed out to the patient and all the patient's questions are entertained. The procedure is terminated . Full discussion regarding the cystoscopic findings will be carried out. Following removal of the cystoscope, the patient is left in position for a suitable period of time and allowed to dress. Post procedure instructions are given. The patient tolerated the procedure well.     The patient is due to have his next series of BCG installations beginning in mid June he will then have his next surveillance cystoscopy at the end of that series in mid July    Discussed this with the patient    This visit exceeded 10 minutes and greater than 50% was counseling. The patient expresses understanding of the treatment plan and wishes to proceed    This dictation used voice recognition software and there may be mistakes.     Maximo Butler MD

## 2018-04-24 NOTE — PROGRESS NOTES
Mr. Tati Briceño has a reminder for a \"due or due soon\" health maintenance. I have asked that he contact his primary care provider for follow-up on this health maintenance.

## 2018-06-12 ENCOUNTER — OFFICE VISIT (OUTPATIENT)
Dept: UROLOGY | Age: 58
End: 2018-06-12

## 2018-06-12 VITALS
HEART RATE: 58 BPM | DIASTOLIC BLOOD PRESSURE: 94 MMHG | SYSTOLIC BLOOD PRESSURE: 147 MMHG | HEIGHT: 66 IN | WEIGHT: 190 LBS | OXYGEN SATURATION: 97 % | BODY MASS INDEX: 30.53 KG/M2

## 2018-06-12 DIAGNOSIS — C67.9 MALIGNANT NEOPLASM OF URINARY BLADDER, UNSPECIFIED SITE (HCC): Primary | ICD-10-CM

## 2018-06-12 LAB
BILIRUB UR QL STRIP: NEGATIVE
GLUCOSE UR-MCNC: NEGATIVE MG/DL
KETONES P FAST UR STRIP-MCNC: NEGATIVE MG/DL
PH UR STRIP: 6 [PH] (ref 4.6–8)
PROT UR QL STRIP: NEGATIVE
SP GR UR STRIP: 1.02 (ref 1–1.03)
UA UROBILINOGEN AMB POC: NORMAL (ref 0.2–1)
URINALYSIS CLARITY POC: CLEAR
URINALYSIS COLOR POC: YELLOW
URINE BLOOD POC: NORMAL
URINE LEUKOCYTES POC: NEGATIVE
URINE NITRITES POC: NEGATIVE

## 2018-06-12 NOTE — PATIENT INSTRUCTIONS
Bacillus of [address] (Inside the bladder)   Bacillus of [place] (ba-SILL-us of Vasu-met and SHELL-in)  Treats or prevents bladder cancer, and used to prevent certain bladder tumors. Also called Bacillus Calmette-Monica (or BCG). Brand Name(s): Thergeovany Lynn BCG   There may be other brand names for this medicine. When This Medicine Should Not Be Used: You should not use this medicine if your immune system is weak due to other medicines or illness. You should not use this medicine if you have a fever (unless your doctor knows the cause) or if you have a bladder infection. You may not be able to receive this medicine if you have active tuberculosis, or blood in your urine. How to Use This Medicine:   Liquid  · Do not drink fluids for 4 hours before your treatment. Empty your bladder before you receive this medicine. · This medicine is given by running the liquid through a tube (catheter) into your bladder. The tube is then taken out, and you will hold the medicine in your bladder for two hours. · While this medicine is in the bladder, you should lie on your back, stomach, and each side for 15 minutes in each position. · You can get up after the first hour, but hold the medicine in your bladder for 1 more hour. If you are unable to hold it, tell your doctor or nurse. · Treatment with BCG may be given once a week for 6 weeks and approximately each month afterward for 6 to 12 months, or as your doctor orders. · Drink plenty of water during each treatment. · After each treatment, all patients (men and women) should sit down to urinate. · Any time you urinate in the first 6 hours after treatment, disinfect the urine by adding an equal amount (usually about 8 ounces, or 1 cup) of household bleach to the toilet bowl and letting it stand for 15 minutes before flushing. · A nurse or other health provider will give you this medicine.   Drugs and Foods to Avoid:   Ask your doctor or pharmacist before using any other medicine, including over-the-counter medicines, vitamins, and herbal products. · Make sure your doctor knows if you are also using a medicine to treat an infection or tuberculosis. Some tuberculosis medicines are ethambutol, isoniazid, and rifampin. Medicines to treat an infection (antibiotics) include amoxicillin, azithromycin, penicillin, Augmentin®, Bactrim®, and Cipro®. · Tell your doctor if you are using any medicine that weakens your immune system, such as steroids or cancer treatments. Some steroids are dexamethasone, prednisolone, prednisone, and Medrol®. Warnings While Using This Medicine:   · Before you begin your treatment, make sure your doctor knows if you are pregnant or breast feeding. Do not get pregnant while you are being treated with this medicine. · Tell your doctor if you have any changes in urinary habits after treatment with this medicine. Changes include urinating more or less than usual or having trouble urinating. · Some people develop infections after receiving this medicine. Call your doctor right away if you start to have flu-like symptoms. These symptoms include chills, weakness, and fever (103 degrees or higher) that lasts longer than 72 hours. · Your doctor will do lab tests at regular visits to check on the effects of this medicine. Keep all appointments. Possible Side Effects While Using This Medicine:   Call your doctor right away if you notice any of these side effects:  · Blood in your urine. · Burning or pain when you urinate. · Fever (103 degrees F or higher), chills, or extreme tiredness. · Severe skin rash. · Uncontrollable shaking or trembling. · Urinating more than usual, or feeling like you have to urinate more often. If you notice these less serious side effects, talk with your doctor:   · Cough. · Mild skin rash. · Nausea or vomiting. · Pain in your joints.   If you notice other side effects that you think are caused by this medicine, tell your doctor. Call your doctor for medical advice about side effects. You may report side effects to FDA at 1-959-YDY-5989  © 2017 [address] Information is for End User's use only and may not be sold, redistributed or otherwise used for commercial purposes. The above information is an  only. It is not intended as medical advice for individual conditions or treatments. Talk to your doctor, nurse or pharmacist before following any medical regimen to see if it is safe and effective for you.

## 2018-06-12 NOTE — MR AVS SNAPSHOT
[facility] [facility] [address] [address] 
[phone] Patient: Ivana Minors MRN: [id number] XFZ:[id number]/5/1893 Visit Information Date & Time Provider Department Dept. Phone Encounter #  
 6/12/2018  9:00 AM Aldo Talavera [id number] Follow-up Instructions Return in about 1 week (around 6/19/2018) for BCG #2. Your Appointments 6/19/2018  9:30 AM  
PROCEDURE with Alma Muñoz MD  
[facility] [address] [place] [address]) Appt Note: BCG #2  
 [address] [place] [address] [address]  
[phone] [phone]  
  
    
 6/26/2018  9:00 AM  
PROCEDURE with Luis Young MD  
[facility] [address] [place] [address]) Appt Note: BCG #3  
 [address] [place] [address] [address]  
[phone] [phone]  
  
    
 7/17/2018  9:15 AM  
PROCEDURE with Alma Muñoz MD  
[facility] [address] [place] [address]) Appt Note: cysto after [address] [place] [address] [address]  
[phone] Upcoming Health Maintenance Date Due Hepatitis C Screening 1960 Pneumococcal 19-64 Highest Risk (1 of 3 - PCV13) 5/4/1981 DTaP/Tdap/Td series (1 - Tdap) 5/4/1983 FOBT Q 1 YEAR AGE 50-75 5/4/2012 Influenza Age 5 to Adult 8/1/2018 Allergies as of 6/12/2018  Review Complete On: 6/12/2018 By: Alma Muñoz MD  
 No Known Allergies Current Immunizations  Never Reviewed No immunizations on file. Not reviewed this visit You Were Diagnosed With   
  
 Codes Comments Malignant neoplasm of urinary bladder, unspecified site [facility])    -  Primary ICD-10-CM: C67.9 ICD-9-CM: 188. 9 Vitals BP Pulse Height(growth percentile) Weight(growth percentile) SpO2 BMI (!) 147/94 (BP 1 Location: Left arm, BP Patient Position: Sitting) (!) 58 5' 6\" (1.676 m) 190 lb (86.2 kg) 97% 30.67 kg/m2 Smoking Status Current Every Day Smoker Vitals History BMI and BSA Data Body Mass Index Body Surface Area  
 30.67 kg/m 2 2 m 2 Preferred Pharmacy Pharmacy Name Phone [address] [place] [address], [address] [place], [place] [address]way [phone] Your Updated Medication List  
  
   
This list is accurate as of 6/12/18  9:22 AM.  Always use your most recent med list.  
  
  
  
  
 gabapentin 800 mg tablet Commonly known as:  NEURONTIN Take 800 mg by mouth three (3) times daily. losartan 100 mg tablet Commonly known as:  COZAAR Take 100 mg by mouth daily. meloxicam 15 mg tablet Commonly known as:  MOBIC Take 1 tab daily or 1/2 tab twice daily as needed pain with food. methocarbamol 750 mg tablet Commonly known as:  ROBAXIN Take 1 Tab by mouth four (4) times daily as needed. Take as needed for muscle spasms  
  
 nicotine 14 mg/24 hr patch Commonly known as:  NICODERM CQ  
APPLY 1 PATCH TOPICALLY EVERY 24 HOURS FOR 6 WEEKS * ANA BCG 50 mg injection Generic drug:  BCG live 50 mg by IntraVESical route once. * BCG live 50 mg injection Commonly known as:  ANA 50 mg by IntraVESical route once for 1 dose. 1 mg/mL  
  
 valACYclovir 500 mg tablet Commonly known as:  VALTREX * Notice: This list has 2 medication(s) that are the same as other medications prescribed for you. Read the directions carefully, and ask your doctor or other care provider to review them with you. We Performed the Following AMB POC URINALYSIS DIP STICK AUTO W/O MICRO [64932 CPT(R)] BCG LIVE INTRAVESICAL VAC [ [facility]] FL INSTILL ANTICANCER AGENT IN BLADDER [id number] CPT(R)] Follow-up Instructions Return in about 1 week (around 6/19/2018) for BCG #2. Patient Instructions Bacillus of [address] (Inside the bladder) Bacillus of [place] (ba-SILL-us of Vasu-met and SHELL-in) Treats or prevents bladder cancer, and used to prevent certain bladder tumors. Also called Bacillus Calmette-Monica (or BCG). Brand Name(s): Justin Harriston BCG There may be other brand names for this medicine. When This Medicine Should Not Be Used: You should not use this medicine if your immune system is weak due to other medicines or illness. You should not use this medicine if you have a fever (unless your doctor knows the cause) or if you have a bladder infection. You may not be able to receive this medicine if you have active tuberculosis, or blood in your urine. How to Use This Medicine:  
Liquid · Do not drink fluids for 4 hours before your treatment. Empty your bladder before you receive this medicine. · This medicine is given by running the liquid through a tube (catheter) into your bladder. The tube is then taken out, and you will hold the medicine in your bladder for two hours. · While this medicine is in the bladder, you should lie on your back, stomach, and each side for 15 minutes in each position. · You can get up after the first hour, but hold the medicine in your bladder for 1 more hour. If you are unable to hold it, tell your doctor or nurse. · Treatment with BCG may be given once a week for 6 weeks and approximately each month afterward for 6 to 12 months, or as your doctor orders. · Drink plenty of water during each treatment. · After each treatment, all patients (men and women) should sit down to urinate. · Any time you urinate in the first 6 hours after treatment, disinfect the urine by adding an equal amount (usually about 8 ounces, or 1 cup) of household bleach to the toilet bowl and letting it stand for 15 minutes before flushing. · A nurse or other health provider will give you this medicine. Drugs and Foods to Avoid: Ask your doctor or pharmacist before using any other medicine, including over-the-counter medicines, vitamins, and herbal products. · Make sure your doctor knows if you are also using a medicine to treat an infection or tuberculosis. Some tuberculosis medicines are ethambutol, isoniazid, and rifampin. Medicines to treat an infection (antibiotics) include amoxicillin, azithromycin, penicillin, Augmentin®, Bactrim®, and Cipro®. · Tell your doctor if you are using any medicine that weakens your immune system, such as steroids or cancer treatments. Some steroids are dexamethasone, prednisolone, prednisone, and Medrol®. Warnings While Using This Medicine: · Before you begin your treatment, make sure your doctor knows if you are pregnant or breast feeding. Do not get pregnant while you are being treated with this medicine. · Tell your doctor if you have any changes in urinary habits after treatment with this medicine. Changes include urinating more or less than usual or having trouble urinating. · Some people develop infections after receiving this medicine. Call your doctor right away if you start to have flu-like symptoms. These symptoms include chills, weakness, and fever (103 degrees or higher) that lasts longer than 72 hours. · Your doctor will do lab tests at regular visits to check on the effects of this medicine. Keep all appointments. Possible Side Effects While Using This Medicine:  
Call your doctor right away if you notice any of these side effects: · Blood in your urine. · Burning or pain when you urinate. · Fever (103 degrees F or higher), chills, or extreme tiredness. · Severe skin rash. · Uncontrollable shaking or trembling. · Urinating more than usual, or feeling like you have to urinate more often. If you notice these less serious side effects, talk with your doctor: · Cough. · Mild skin rash. · Nausea or vomiting. · Pain in your joints. If you notice other side effects that you think are caused by this medicine, tell your doctor. Call your doctor for medical advice about side effects. You may report side effects to FDA at 3-663-DFP-0075 © 2017 [address] Information is for End User's use only and may not be sold, redistributed or otherwise used for commercial purposes. The above information is an  only. It is not intended as medical advice for individual conditions or treatments. Talk to your doctor, nurse or pharmacist before following any medical regimen to see if it is safe and effective for you. Introducing [facility]! [facility] introduces Enplug patient portal. Now you can access parts of your medical record, email your doctor's office, and request medication refills online. 1. In your internet browser, go to [email or web address] 2. Click on the First Time User? Click Here link in the Sign In box. You will see the New Member Sign Up page. 3. Enter your Enplug Access Code exactly as it appears below. You will not need to use this code after youve completed the sign-up process. If you do not sign up before the expiration date, you must request a new code. · Enplug Access Code: [email or web address] Expires: 9/10/2018  8:42 AM 
 
4. Enter the last four digits of your Social Security Number (xxxx) and Date of Birth (mm/dd/yyyy) as indicated and click Submit. You will be taken to the next sign-up page. 5. Create a Novalactt ID. This will be your Enplug login ID and cannot be changed, so think of one that is secure and easy to remember. 6. Create a Enplug password. You can change your password at any time. 7. Enter your Password Reset Question and Answer. This can be used at a later time if you forget your password. 8. Enter your e-mail address. You will receive e-mail notification when new information is available in [address] 9. Click Sign Up. You can now view and download portions of your medical record. 10. Click the Download Summary menu link to download a portable copy of your medical information. If you have questions, please visit the Frequently Asked Questions section of the Revivn website. Remember, Revivn is NOT to be used for urgent needs. For medical emergencies, dial 911. Now available from your iPhone and Android! Please provide this summary of care documentation to your next provider. Your primary care clinician is listed as Jose Ramey. If you have any questions after today's visit, please call [phone].

## 2018-06-12 NOTE — PROGRESS NOTES
[facility]  OFFICE PROCEDURE PROGRESS NOTE        Chart reviewed for the following:   ILady lEi LPN, have reviewed the History, Physical and updated the Allergic reactions for [place] performed immediately prior to start of procedure:   Avelina Mahajan LPN, have performed the following reviews on Italo Muniz prior to the start of the procedure:            * Patient was identified by name and date of birth   * Agreement on procedure being performed was verified  * Risks and Benefits explained to the patient  * Procedure site verified and marked as necessary  * Patient was positioned for comfort  * Consent was signed and verified     Time: 9:09AM      Date of procedure: 6/12/2018    Procedure performed by:  Eris Bowles MD    Provider assisted by: Portillo Cavanaugh LPN    Patient assisted by: self    How tolerated by patient: tolerated the procedure well with no complications    Post Procedural Pain Scale: 0 - No Hurt    Comments: none    Patient verbalized understanding of procedure and post procedure instructions.

## 2018-06-12 NOTE — PROGRESS NOTES
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 He comes now for the first in his series of 3 BCG maintenance instillations. The patient is placed supine and is prepped in sterile fashion. A straight catheter was placed and the urine is draining. Previously makes BCG is instilled in the routine manner and the patient will follow-up post ablation protocol. The patient will return next week.   He is due for his next surveillance cystoscopy mid to late July    The patient is expressing some discontent with the need to continue on in this manner and I have had extensive counseling with the patient regarding the advisability of following the maintenance protocol in order to use his risk of recurrence with this high-grade malignancy

## 2018-06-12 NOTE — PROGRESS NOTES
Mr. Johann Reyes has a reminder for a \"due or due soon\" health maintenance. I have asked that he contact his primary care provider for follow-up on this health maintenance.

## 2018-06-19 ENCOUNTER — OFFICE VISIT (OUTPATIENT)
Dept: UROLOGY | Age: 58
End: 2018-06-19

## 2018-06-19 VITALS
WEIGHT: 190 LBS | HEIGHT: 66 IN | BODY MASS INDEX: 30.53 KG/M2 | HEART RATE: 54 BPM | DIASTOLIC BLOOD PRESSURE: 88 MMHG | SYSTOLIC BLOOD PRESSURE: 138 MMHG

## 2018-06-19 DIAGNOSIS — C67.9 MALIGNANT NEOPLASM OF URINARY BLADDER, UNSPECIFIED SITE (HCC): Primary | ICD-10-CM

## 2018-06-19 LAB
BILIRUB UR QL STRIP: NEGATIVE
GLUCOSE UR-MCNC: NEGATIVE MG/DL
KETONES P FAST UR STRIP-MCNC: NEGATIVE MG/DL
PH UR STRIP: 6 [PH] (ref 4.6–8)
PROT UR QL STRIP: NORMAL
SP GR UR STRIP: 1.02 (ref 1–1.03)
UA UROBILINOGEN AMB POC: NORMAL (ref 0.2–1)
URINALYSIS CLARITY POC: CLEAR
URINALYSIS COLOR POC: YELLOW
URINE BLOOD POC: NEGATIVE
URINE LEUKOCYTES POC: NEGATIVE
URINE NITRITES POC: NEGATIVE

## 2018-06-19 NOTE — PROGRESS NOTES
Patient is here for his second in a series of 3 BCG maintenance instillations. Patient was placed supine and prepped. A straight catheter was used to drain the bladder and previously prepared BCG is instilled in routine manner. The patient will follow post installation protocol. The patient will return next week for his third in a series of 3 BCG applications. He is due for a 3 month surveillance cystoscopy in the end of July      This visit exceeded 10 minutes and greater than 50% was counseling. The patient expresses understanding of the treatment plan and wishes to proceed    This dictation used voice recognition software and there may be mistakes.     Bryn Ly MD

## 2018-06-19 NOTE — PATIENT INSTRUCTIONS
Bacillus of [address] (Inside the bladder)   Bacillus of [place] (ba-SILL-us of Vasu-met and SHELL-in)  Treats or prevents bladder cancer, and used to prevent certain bladder tumors. Also called Bacillus Calmette-Monica (or BCG). Brand Name(s): Thergeovany Lynn BCG   There may be other brand names for this medicine. When This Medicine Should Not Be Used: You should not use this medicine if your immune system is weak due to other medicines or illness. You should not use this medicine if you have a fever (unless your doctor knows the cause) or if you have a bladder infection. You may not be able to receive this medicine if you have active tuberculosis, or blood in your urine. How to Use This Medicine:   Liquid  · Do not drink fluids for 4 hours before your treatment. Empty your bladder before you receive this medicine. · This medicine is given by running the liquid through a tube (catheter) into your bladder. The tube is then taken out, and you will hold the medicine in your bladder for two hours. · While this medicine is in the bladder, you should lie on your back, stomach, and each side for 15 minutes in each position. · You can get up after the first hour, but hold the medicine in your bladder for 1 more hour. If you are unable to hold it, tell your doctor or nurse. · Treatment with BCG may be given once a week for 6 weeks and approximately each month afterward for 6 to 12 months, or as your doctor orders. · Drink plenty of water during each treatment. · After each treatment, all patients (men and women) should sit down to urinate. · Any time you urinate in the first 6 hours after treatment, disinfect the urine by adding an equal amount (usually about 8 ounces, or 1 cup) of household bleach to the toilet bowl and letting it stand for 15 minutes before flushing. · A nurse or other health provider will give you this medicine.   Drugs and Foods to Avoid:   Ask your doctor or pharmacist before using any other medicine, including over-the-counter medicines, vitamins, and herbal products. · Make sure your doctor knows if you are also using a medicine to treat an infection or tuberculosis. Some tuberculosis medicines are ethambutol, isoniazid, and rifampin. Medicines to treat an infection (antibiotics) include amoxicillin, azithromycin, penicillin, Augmentin®, Bactrim®, and Cipro®. · Tell your doctor if you are using any medicine that weakens your immune system, such as steroids or cancer treatments. Some steroids are dexamethasone, prednisolone, prednisone, and Medrol®. Warnings While Using This Medicine:   · Before you begin your treatment, make sure your doctor knows if you are pregnant or breast feeding. Do not get pregnant while you are being treated with this medicine. · Tell your doctor if you have any changes in urinary habits after treatment with this medicine. Changes include urinating more or less than usual or having trouble urinating. · Some people develop infections after receiving this medicine. Call your doctor right away if you start to have flu-like symptoms. These symptoms include chills, weakness, and fever (103 degrees or higher) that lasts longer than 72 hours. · Your doctor will do lab tests at regular visits to check on the effects of this medicine. Keep all appointments. Possible Side Effects While Using This Medicine:   Call your doctor right away if you notice any of these side effects:  · Blood in your urine. · Burning or pain when you urinate. · Fever (103 degrees F or higher), chills, or extreme tiredness. · Severe skin rash. · Uncontrollable shaking or trembling. · Urinating more than usual, or feeling like you have to urinate more often. If you notice these less serious side effects, talk with your doctor:   · Cough. · Mild skin rash. · Nausea or vomiting. · Pain in your joints.   If you notice other side effects that you think are caused by this medicine, tell your doctor. Call your doctor for medical advice about side effects. You may report side effects to FDA at 0-173-SQS-2381  © 2017 [address] Information is for End User's use only and may not be sold, redistributed or otherwise used for commercial purposes. The above information is an  only. It is not intended as medical advice for individual conditions or treatments. Talk to your doctor, nurse or pharmacist before following any medical regimen to see if it is safe and effective for you.

## 2018-06-19 NOTE — PROGRESS NOTES
[facility]  OFFICE PROCEDURE PROGRESS NOTE        Chart reviewed for the following:   Diana PETTIT LPN, have reviewed the History, Physical and updated the Allergic reactions for [place] performed immediately prior to start of procedure:   Kellie Bacon LPN, have performed the following reviews on Pedro Paige prior to the start of the procedure:            * Patient was identified by name and date of birth   * Agreement on procedure being performed was verified  * Risks and Benefits explained to the patient  * Procedure site verified and marked as necessary  * Patient was positioned for comfort  * Consent was signed and verified     Time: 2:08PM      Date of procedure: 6/19/2018    Procedure performed by:  Grisel Simms MD    Provider assisted by: David Ruby LPN    Patient assisted by: self    How tolerated by patient: tolerated the procedure well with no complications    Post Procedural Pain Scale: 0 - No Hurt    Comments: none    Patient verbalized understanding of procedure and post procedure instructions.

## 2018-06-19 NOTE — PROGRESS NOTES
Mr. Hawa Cho has a reminder for a \"due or due soon\" health maintenance. I have asked that he contact his primary care provider for follow-up on this health maintenance.

## 2018-06-19 NOTE — MR AVS SNAPSHOT
[facility] [facility] [address] [address] 
[phone] Patient: Devin Izaguirre MRN: [id number] GBL:[id number] Visit Information Date & Time Provider Department Dept. Phone Encounter #  
 6/19/2018  1:45 PM Mercedez Maciel, [facility] [phone] Your Appointments 6/26/2018  9:00 AM  
PROCEDURE with Harlan Mata MD  
[facility] [address] [place] [address]) Appt Note: BCG #3  
 [address] [place] [address] [address]  
[phone] [phone]  
  
    
 7/17/2018  9:15 AM  
PROCEDURE with Mercedez Maciel MD  
[facility] [address] [place] [address]) Appt Note: cysto after [address] [place] [address] [address]  
[phone] [address] [address] Upcoming Health Maintenance Date Due Hepatitis C Screening 1960 Pneumococcal 19-64 Highest Risk (1 of 3 - PCV13) 5/4/1981 DTaP/Tdap/Td series (1 - Tdap) 5/4/1983 FOBT Q 1 YEAR AGE 50-75 5/4/2012 MEDICARE YEARLY EXAM 6/19/2018 Influenza Age 5 to Adult 8/1/2018 Allergies as of 6/19/2018  Review Complete On: 6/19/2018 By: Michelle Tran LPN No Known Allergies Current Immunizations  Never Reviewed No immunizations on file. Not reviewed this visit You Were Diagnosed With   
  
 Codes Comments Malignant neoplasm of urinary bladder, unspecified site [facility])    -  Primary ICD-10-CM: C67.9 ICD-9-CM: 188. 9 Vitals BP Pulse Height(growth percentile) Weight(growth percentile) BMI Smoking Status 138/88 (BP 1 Location: Left arm, BP Patient Position: Sitting) (!) 54 5' 6\" (1.676 m) 190 lb (86.2 kg) 30.67 kg/m2 Current Every Day Smoker Vitals History BMI and BSA Data Body Mass Index Body Surface Area  
 30.67 kg/m 2 2 m 2 Preferred Pharmacy Pharmacy Name Phone [address], [address] [facility] [phone] Your Updated Medication List  
  
   
This list is accurate as of 6/19/18  3:33 PM.  Always use your most recent med list.  
  
  
  
  
 gabapentin 800 mg tablet Commonly known as:  NEURONTIN Take 800 mg by mouth three (3) times daily. losartan 100 mg tablet Commonly known as:  COZAAR Take 100 mg by mouth daily. meloxicam 15 mg tablet Commonly known as:  MOBIC Take 1 tab daily or 1/2 tab twice daily as needed pain with food. methocarbamol 750 mg tablet Commonly known as:  ROBAXIN Take 1 Tab by mouth four (4) times daily as needed. Take as needed for muscle spasms  
  
 nicotine 14 mg/24 hr patch Commonly known as:  NICODERM CQ  
APPLY 1 PATCH TOPICALLY EVERY 24 HOURS FOR 6 WEEKS  
  
 ANA BCG 50 mg injection Generic drug:  BCG live 50 mg by IntraVESical route once. valACYclovir 500 mg tablet Commonly known as:  VALTREX We Performed the Following AMB POC URINALYSIS DIP STICK AUTO W/O MICRO [10909 CPT(R)] Patient Instructions Bacillus of [employer] [place] (Inside the bladder) Bacillus of [place] (ba-SILL-us of Vasu-met and SHELL-in) Treats or prevents bladder cancer, and used to prevent certain bladder tumors. Also called Bacillus Calmette-Monica (or BCG). Brand Name(s): Theracys, Machias BCG There may be other brand names for this medicine. When This Medicine Should Not Be Used: You should not use this medicine if your immune system is weak due to other medicines or illness. You should not use this medicine if you have a fever (unless your doctor knows the cause) or if you have a bladder infection. You may not be able to receive this medicine if you have active tuberculosis, or blood in your urine. How to Use This Medicine:  
Liquid · Do not drink fluids for 4 hours before your treatment.  Empty your bladder before you receive this medicine. · This medicine is given by running the liquid through a tube (catheter) into your bladder. The tube is then taken out, and you will hold the medicine in your bladder for two hours. · While this medicine is in the bladder, you should lie on your back, stomach, and each side for 15 minutes in each position. · You can get up after the first hour, but hold the medicine in your bladder for 1 more hour. If you are unable to hold it, tell your doctor or nurse. · Treatment with BCG may be given once a week for 6 weeks and approximately each month afterward for 6 to 12 months, or as your doctor orders. · Drink plenty of water during each treatment. · After each treatment, all patients (men and women) should sit down to urinate. · Any time you urinate in the first 6 hours after treatment, disinfect the urine by adding an equal amount (usually about 8 ounces, or 1 cup) of household bleach to the toilet bowl and letting it stand for 15 minutes before flushing. · A nurse or other health provider will give you this medicine. Drugs and Foods to Avoid: Ask your doctor or pharmacist before using any other medicine, including over-the-counter medicines, vitamins, and herbal products. · Make sure your doctor knows if you are also using a medicine to treat an infection or tuberculosis. Some tuberculosis medicines are ethambutol, isoniazid, and rifampin. Medicines to treat an infection (antibiotics) include amoxicillin, azithromycin, penicillin, Augmentin®, Bactrim®, and Cipro®. · Tell your doctor if you are using any medicine that weakens your immune system, such as steroids or cancer treatments. Some steroids are dexamethasone, prednisolone, prednisone, and Medrol®. Warnings While Using This Medicine: · Before you begin your treatment, make sure your doctor knows if you are pregnant or breast feeding. Do not get pregnant while you are being treated with this medicine. · Tell your doctor if you have any changes in urinary habits after treatment with this medicine. Changes include urinating more or less than usual or having trouble urinating. · Some people develop infections after receiving this medicine. Call your doctor right away if you start to have flu-like symptoms. These symptoms include chills, weakness, and fever (103 degrees or higher) that lasts longer than 72 hours. · Your doctor will do lab tests at regular visits to check on the effects of this medicine. Keep all appointments. Possible Side Effects While Using This Medicine:  
Call your doctor right away if you notice any of these side effects: · Blood in your urine. · Burning or pain when you urinate. · Fever (103 degrees F or higher), chills, or extreme tiredness. · Severe skin rash. · Uncontrollable shaking or trembling. · Urinating more than usual, or feeling like you have to urinate more often. If you notice these less serious side effects, talk with your doctor: · Cough. · Mild skin rash. · Nausea or vomiting. · Pain in your joints. If you notice other side effects that you think are caused by this medicine, tell your doctor. Call your doctor for medical advice about side effects. You may report side effects to FDA at 4-450-FDA-9607 © 2017 [address] Information is for End User's use only and may not be sold, redistributed or otherwise used for commercial purposes. The above information is an  only. It is not intended as medical advice for individual conditions or treatments. Talk to your doctor, nurse or pharmacist before following any medical regimen to see if it is safe and effective for you. Introducing [facility]! Pepper Anderson introduces [employer] patient portal. Now you can access parts of your medical record, email your doctor's office, and request medication refills online.    
 
1. In your internet browser, go to [email or web address] 2. Click on the First Time User? Click Here link in the Sign In box. You will see the New Member Sign Up page. 3. Enter your Readz Access Code exactly as it appears below. You will not need to use this code after youve completed the sign-up process. If you do not sign up before the expiration date, you must request a new code. · Readz Access Code: [email or web address] Expires: 9/10/2018  8:42 AM 
 
4. Enter the last four digits of your Social Security Number (xxxx) and Date of Birth (mm/dd/yyyy) as indicated and click Submit. You will be taken to the next sign-up page. 5. Create a Nualightt ID. This will be your Readz login ID and cannot be changed, so think of one that is secure and easy to remember. 6. Create a Readz password. You can change your password at any time. 7. Enter your Password Reset Question and Answer. This can be used at a later time if you forget your password. 8. Enter your e-mail address. You will receive e-mail notification when new information is available in [address] 9. Click Sign Up. You can now view and download portions of your medical record. 10. Click the Download Summary menu link to download a portable copy of your medical information. If you have questions, please visit the Frequently Asked Questions section of the Readz website. Remember, Readz is NOT to be used for urgent needs. For medical emergencies, dial 911. Now available from your iPhone and Android! Please provide this summary of care documentation to your next provider. Your primary care clinician is listed as Crista Castro. If you have any questions after today's visit, please call [phone].

## 2018-06-26 ENCOUNTER — OFFICE VISIT (OUTPATIENT)
Dept: UROLOGY | Age: 58
End: 2018-06-26

## 2018-06-26 VITALS
SYSTOLIC BLOOD PRESSURE: 141 MMHG | OXYGEN SATURATION: 98 % | DIASTOLIC BLOOD PRESSURE: 84 MMHG | BODY MASS INDEX: 29.89 KG/M2 | HEIGHT: 66 IN | WEIGHT: 186 LBS | HEART RATE: 49 BPM

## 2018-06-26 DIAGNOSIS — C67.9 MALIGNANT NEOPLASM OF URINARY BLADDER, UNSPECIFIED SITE (HCC): Primary | ICD-10-CM

## 2018-06-26 LAB
BILIRUB UR QL STRIP: NEGATIVE
GLUCOSE UR-MCNC: NEGATIVE MG/DL
KETONES P FAST UR STRIP-MCNC: NEGATIVE MG/DL
PH UR STRIP: 6 [PH] (ref 4.6–8)
PROT UR QL STRIP: NORMAL
SP GR UR STRIP: 1.02 (ref 1–1.03)
UA UROBILINOGEN AMB POC: NORMAL (ref 0.2–1)
URINALYSIS CLARITY POC: CLEAR
URINALYSIS COLOR POC: YELLOW
URINE BLOOD POC: NEGATIVE
URINE LEUKOCYTES POC: NORMAL
URINE NITRITES POC: NEGATIVE

## 2018-06-26 RX ORDER — DORZOLAMIDE HYDROCHLORIDE AND TIMOLOL MALEATE 20; 5 MG/ML; MG/ML
SOLUTION/ DROPS OPHTHALMIC
Refills: 0 | COMMUNITY
Start: 2018-06-19

## 2018-06-26 NOTE — PROCEDURES
Under satisfactory skin preparation, the catheter was inserted and the bladder was drained of about 20 cc of residual.  50 cc of freshly prepared BCG was instilled into the bladder and the usual post instillation instructions given. No immediate problems and he is instructed to call us if he has any trouble.

## 2018-06-26 NOTE — PATIENT INSTRUCTIONS
Bacillus of [address] (Inside the bladder)   Bacillus of [place] (ba-SILL-us of Vasu-met and SHELL-in)  Treats or prevents bladder cancer, and used to prevent certain bladder tumors. Also called Bacillus Calmette-Monica (or BCG). Brand Name(s): Thergeovany Lynn BCG   There may be other brand names for this medicine. When This Medicine Should Not Be Used: You should not use this medicine if your immune system is weak due to other medicines or illness. You should not use this medicine if you have a fever (unless your doctor knows the cause) or if you have a bladder infection. You may not be able to receive this medicine if you have active tuberculosis, or blood in your urine. How to Use This Medicine:   Liquid  · Do not drink fluids for 4 hours before your treatment. Empty your bladder before you receive this medicine. · This medicine is given by running the liquid through a tube (catheter) into your bladder. The tube is then taken out, and you will hold the medicine in your bladder for two hours. · While this medicine is in the bladder, you should lie on your back, stomach, and each side for 15 minutes in each position. · You can get up after the first hour, but hold the medicine in your bladder for 1 more hour. If you are unable to hold it, tell your doctor or nurse. · Treatment with BCG may be given once a week for 6 weeks and approximately each month afterward for 6 to 12 months, or as your doctor orders. · Drink plenty of water during each treatment. · After each treatment, all patients (men and women) should sit down to urinate. · Any time you urinate in the first 6 hours after treatment, disinfect the urine by adding an equal amount (usually about 8 ounces, or 1 cup) of household bleach to the toilet bowl and letting it stand for 15 minutes before flushing. · A nurse or other health provider will give you this medicine.   Drugs and Foods to Avoid:   Ask your doctor or pharmacist before using any other medicine, including over-the-counter medicines, vitamins, and herbal products. · Make sure your doctor knows if you are also using a medicine to treat an infection or tuberculosis. Some tuberculosis medicines are ethambutol, isoniazid, and rifampin. Medicines to treat an infection (antibiotics) include amoxicillin, azithromycin, penicillin, Augmentin®, Bactrim®, and Cipro®. · Tell your doctor if you are using any medicine that weakens your immune system, such as steroids or cancer treatments. Some steroids are dexamethasone, prednisolone, prednisone, and Medrol®. Warnings While Using This Medicine:   · Before you begin your treatment, make sure your doctor knows if you are pregnant or breast feeding. Do not get pregnant while you are being treated with this medicine. · Tell your doctor if you have any changes in urinary habits after treatment with this medicine. Changes include urinating more or less than usual or having trouble urinating. · Some people develop infections after receiving this medicine. Call your doctor right away if you start to have flu-like symptoms. These symptoms include chills, weakness, and fever (103 degrees or higher) that lasts longer than 72 hours. · Your doctor will do lab tests at regular visits to check on the effects of this medicine. Keep all appointments. Possible Side Effects While Using This Medicine:   Call your doctor right away if you notice any of these side effects:  · Blood in your urine. · Burning or pain when you urinate. · Fever (103 degrees F or higher), chills, or extreme tiredness. · Severe skin rash. · Uncontrollable shaking or trembling. · Urinating more than usual, or feeling like you have to urinate more often. If you notice these less serious side effects, talk with your doctor:   · Cough. · Mild skin rash. · Nausea or vomiting. · Pain in your joints.   If you notice other side effects that you think are caused by this medicine, tell your doctor. Call your doctor for medical advice about side effects. You may report side effects to FDA at 3-533-BRO-5718  © 2017 [facility] Information is for End User's use only and may not be sold, redistributed or otherwise used for commercial purposes. The above information is an  only. It is not intended as medical advice for individual conditions or treatments. Talk to your doctor, nurse or pharmacist before following any medical regimen to see if it is safe and effective for you.
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
## 2018-06-26 NOTE — MR AVS SNAPSHOT
[address] [facility] [address] [address] 
[phone] Patient: Cynthia Toro MRN: [id number] DKN:8/7/1414 Visit Information Date & Time Provider Department Dept. Phone Encounter #  
 6/26/2018  9:00 AM Olga Moss, [address] [facility] [phone] [id number] Follow-up Instructions Return in about 4 weeks (around 7/24/2018). Follow-up and Disposition History Your Appointments 7/17/2018  1:15 PM  
PROCEDURE with Beverly Melo MD  
[facility] [address]) Appt Note: cysto after BCG; .  
 [address] [facility] [address] [id number] [phone] [address] [facility] [address] Upcoming Health Maintenance Date Due Hepatitis C Screening 1960 Pneumococcal 19-64 Highest Risk (1 of 3 - PCV13) 5/4/1981 DTaP/Tdap/Td series (1 - Tdap) 5/4/1983 FOBT Q 1 YEAR AGE 50-75 5/4/2012 Influenza Age 5 to Adult 8/1/2018 Allergies as of 6/26/2018  Review Complete On: 6/26/2018 By: Olga Moss MD  
 No Known Allergies Current Immunizations  Never Reviewed No immunizations on file. Not reviewed this visit You Were Diagnosed With   
  
 Codes Comments Malignant neoplasm of urinary bladder, unspecified site [facility])    -  Primary ICD-10-CM: C67.9 ICD-9-CM: 188. 9 Vitals BP Pulse Height(growth percentile) Weight(growth percentile) SpO2 BMI  
 141/84 (BP 1 Location: Right arm, BP Patient Position: Sitting) (!) 49 5' 6\" (1.676 m) 186 lb (84.4 kg) 98% 30.02 kg/m2 Smoking Status Current Every Day Smoker Vitals History BMI and BSA Data Body Mass Index Body Surface Area 30.02 kg/m 2 1.98 m 2 Preferred Pharmacy Pharmacy Name Phone [address], [address] [place] [address] [phone] Your Updated Medication List  
  
   
This list is accurate as of 6/26/18 11:18 AM.  Always use your most recent med list.  
  
  
  
  
 dorzolamide-timolol 22.3-6.8 mg/mL ophthalmic solution Commonly known as:  COSOPT INSTILL 1 DROP IN [facility] EYE EVERY TWELVE HOURS  
  
 gabapentin 800 mg tablet Commonly known as:  NEURONTIN Take 800 mg by mouth three (3) times daily. losartan 100 mg tablet Commonly known as:  COZAAR Take 100 mg by mouth daily. meloxicam 15 mg tablet Commonly known as:  MOBIC Take 1 tab daily or 1/2 tab twice daily as needed pain with food. methocarbamol 750 mg tablet Commonly known as:  ROBAXIN Take 1 Tab by mouth four (4) times daily as needed. Take as needed for muscle spasms  
  
 nicotine 14 mg/24 hr patch Commonly known as:  NICODERM CQ  
APPLY 1 PATCH TOPICALLY EVERY 24 HOURS FOR 6 WEEKS * ANA BCG 50 mg injection Generic drug:  BCG live 50 mg by IntraVESical route once. * BCG live 50 mg injection Commonly known as:  ANA 50 mg by IntraVESical route once for 1 dose. 1 mg/mL  
  
 valACYclovir 500 mg tablet Commonly known as:  VALTREX * Notice: This list has 2 medication(s) that are the same as other medications prescribed for you. Read the directions carefully, and ask your doctor or other care provider to review them with you. We Performed the Following AMB POC URINALYSIS DIP STICK AUTO W/O MICRO [49885 CPT(R)] BCG LIVE INTRAVESICAL VAC [ [facility]] CO INSTILL ANTICANCER AGENT IN BLADDER [id number] CPT(R)] Follow-up Instructions Return in about 4 weeks (around 7/24/2018). Patient Instructions Bacillus of [address] (Inside the bladder) Bacillus of [place] (ba-SILL-us of Vasu-met and SHELL-in) Treats or prevents bladder cancer, and used to prevent certain bladder tumors. Also called Bacillus [place]met[place]-Monica (or BCG).   
Brand Name(s): [employer] [place] BCG  
 There may be other brand names for this medicine. When This Medicine Should Not Be Used: You should not use this medicine if your immune system is weak due to other medicines or illness. You should not use this medicine if you have a fever (unless your doctor knows the cause) or if you have a bladder infection. You may not be able to receive this medicine if you have active tuberculosis, or blood in your urine. How to Use This Medicine:  
Liquid · Do not drink fluids for 4 hours before your treatment. Empty your bladder before you receive this medicine. · This medicine is given by running the liquid through a tube (catheter) into your bladder. The tube is then taken out, and you will hold the medicine in your bladder for two hours. · While this medicine is in the bladder, you should lie on your back, stomach, and each side for 15 minutes in each position. · You can get up after the first hour, but hold the medicine in your bladder for 1 more hour. If you are unable to hold it, tell your doctor or nurse. · Treatment with BCG may be given once a week for 6 weeks and approximately each month afterward for 6 to 12 months, or as your doctor orders. · Drink plenty of water during each treatment. · After each treatment, all patients (men and women) should sit down to urinate. · Any time you urinate in the first 6 hours after treatment, disinfect the urine by adding an equal amount (usually about 8 ounces, or 1 cup) of household bleach to the toilet bowl and letting it stand for 15 minutes before flushing. · A nurse or other health provider will give you this medicine. Drugs and Foods to Avoid: Ask your doctor or pharmacist before using any other medicine, including over-the-counter medicines, vitamins, and herbal products. · Make sure your doctor knows if you are also using a medicine to treat an infection or tuberculosis.  Some tuberculosis medicines are ethambutol, isoniazid, and rifampin. Medicines to treat an infection (antibiotics) include amoxicillin, azithromycin, penicillin, Augmentin®, Bactrim®, and Cipro®. · Tell your doctor if you are using any medicine that weakens your immune system, such as steroids or cancer treatments. Some steroids are dexamethasone, prednisolone, prednisone, and Medrol®. Warnings While Using This Medicine: · Before you begin your treatment, make sure your doctor knows if you are pregnant or breast feeding. Do not get pregnant while you are being treated with this medicine. · Tell your doctor if you have any changes in urinary habits after treatment with this medicine. Changes include urinating more or less than usual or having trouble urinating. · Some people develop infections after receiving this medicine. Call your doctor right away if you start to have flu-like symptoms. These symptoms include chills, weakness, and fever (103 degrees or higher) that lasts longer than 72 hours. · Your doctor will do lab tests at regular visits to check on the effects of this medicine. Keep all appointments. Possible Side Effects While Using This Medicine:  
Call your doctor right away if you notice any of these side effects: · Blood in your urine. · Burning or pain when you urinate. · Fever (103 degrees F or higher), chills, or extreme tiredness. · Severe skin rash. · Uncontrollable shaking or trembling. · Urinating more than usual, or feeling like you have to urinate more often. If you notice these less serious side effects, talk with your doctor: · Cough. · Mild skin rash. · Nausea or vomiting. · Pain in your joints. If you notice other side effects that you think are caused by this medicine, tell your doctor. Call your doctor for medical advice about side effects. You may report side effects to FDA at 6-401-FDA-1400 © 2017 [address] Information is for End User's use only and may not be sold, redistributed or otherwise used for commercial purposes. The above information is an  only. It is not intended as medical advice for individual conditions or treatments. Talk to your doctor, nurse or pharmacist before following any medical regimen to see if it is safe and effective for you. Patient Instructions History Introducing \[facility] [place] [facility]\"" & HEALTH SERVICES! [employer] introduces [employer] patient portal. Now you can access parts of your medical record, email your doctor's office, and request medication refills online. 1. In your internet browser, go to [email or web address] 2. Click on the First Time User? Click Here link in the Sign In box. You will see the New Member Sign Up page. 3. Enter your [employer] Access Code exactly as it appears below. You will not need to use this code after youve completed the sign-up process. If you do not sign up before the expiration date, you must request a new code. · [employer] Access Code: [email or web address] Expires: 9/10/2018  8:42 AM 
 
4. Enter the last four digits of your Social Security Number (xxxx) and Date of Birth (mm/dd/yyyy) as indicated and click Submit. You will be taken to the next sign-up page. 5. Create a [employer] ID. This will be your [employer] login ID and cannot be changed, so think of one that is secure and easy to remember. 6. Create a [employer] password. You can change your password at any time. 7. Enter your Password Reset Question and Answer. This can be used at a later time if you forget your password. 8. Enter your e-mail address. You will receive e-mail notification when new information is available in [address] 9. Click Sign Up. You can now view and download portions of your medical record. 10. Click the Download Summary menu link to download a portable copy of your medical information.  
 
If you have questions, please visit the Frequently Asked Questions section of the RECESS.. Remember, [employer] is NOT to be used for urgent needs. For medical emergencies, dial 911. Now available from your iPhone and Android! Please provide this summary of care documentation to your next provider. Your primary care clinician is listed as Jose Ramey. If you have any questions after today's visit, please call [phone].

## 2018-06-26 NOTE — PROGRESS NOTES
[facility]  OFFICE PROCEDURE PROGRESS NOTE        Chart reviewed for the following:   Mor PETTIT LPN, have reviewed the History, Physical and updated the Allergic reactions for [place] performed immediately prior to start of procedure:   Yane Plunkett LPN, have performed the following reviews on Paulino Huang prior to the start of the procedure:            * Patient was identified by name and date of birth   * Agreement on procedure being performed was verified  * Risks and Benefits explained to the patient  * Procedure site verified and marked as necessary  * Patient was positioned for comfort  * Consent was signed and verified     Time: 9:49AM      Date of procedure: 6/26/2018    Procedure performed by:  Salvatore Jordan MD    Provider assisted by: Shola Gama LPN    Patient assisted by: self    How tolerated by patient: tolerated the procedure well with no complications    Post Procedural Pain Scale: 0 - No Hurt    Comments: none    Patient verbalized understanding of procedure and post procedure instructions.

## 2018-08-10 ENCOUNTER — OFFICE VISIT (OUTPATIENT)
Dept: UROLOGY | Age: 58
End: 2018-08-10

## 2018-08-10 VITALS
SYSTOLIC BLOOD PRESSURE: 140 MMHG | HEIGHT: 66 IN | WEIGHT: 185 LBS | DIASTOLIC BLOOD PRESSURE: 90 MMHG | OXYGEN SATURATION: 98 % | BODY MASS INDEX: 29.73 KG/M2 | HEART RATE: 59 BPM

## 2018-08-10 DIAGNOSIS — C67.9 MALIGNANT NEOPLASM OF URINARY BLADDER, UNSPECIFIED SITE (HCC): Primary | ICD-10-CM

## 2018-08-10 NOTE — PROGRESS NOTES
The patient is prepared for cystoscopy. He has been counseled regarding the risks which include, but are not limited to, infection, bleeding, injury, failure to diagnose, and possible need for additional procedures. He wishes to proceed, giving his informed consent. Procedure note: The patient is in the slightly inclined supine position where he is prepped and draped in sterile fashion. Lidocaine jelly has been inserted into the urethra and a suitable time has been permitted to elapse for establishment of anesthesia. A flexible cystoscope is inserted and video cystoscopy is carried out with the patient able to observe the monitor. The findings are as follows:  1. Pendulous urethra normal  2. Bulbar urethra normal  3. External sphincter normal  4. Prostate   5. Bladder neck  6. Trigone  7. Bladder wall-all above structures are normal and appropriate for age. There is no epithelial lesion present    Pertinent observations are pointed out to the patient and all the patient's questions are entertained. The procedure is terminated . Full discussion regarding the cystoscopic findings will be carried out. Following removal of the cystoscope, the patient is left in position for a suitable period of time and allowed to dress. Post procedure instructions are given. The patient tolerated the procedure well. The patient will begin the next series of BCG installations the third week of September.   He will return for his next cystoscopy in November

## 2018-08-10 NOTE — PROGRESS NOTES
[facility]  OFFICE PROCEDURE PROGRESS NOTE        Chart reviewed for the following:   IElodia, have reviewed the History, Physical and updated the Allergic reactions for [place] performed immediately prior to start of procedure:   Kellie Palencia, have performed the following reviews on Michelle Bakes prior to the start of the procedure:            * Patient was identified by name and date of birth   * Agreement on procedure being performed was verified  * Risks and Benefits explained to the patient  * Procedure site verified and marked as necessary  * Patient was positioned for comfort  * Consent was signed and verified     Time: 2:18PM      Date of procedure: 8/10/2018    Procedure performed by:  Jannette Anne MD    Provider assisted by: Elodia Hernández MA    Patient assisted by: self    How tolerated by patient: tolerated the procedure well with no complications    Post Procedural Pain Scale: 0 - No Hurt    Comments: Patient verbalized understanding of procedure and post procedure instructions.

## 2018-08-10 NOTE — MR AVS SNAPSHOT
[address] [facility] [address] [address] 
[phone] Patient: Zuleika Fischer MRN: [id number] RPL:[birth year] Visit Information Date & Time Provider Department Dept. Phone Encounter #  
 8/10/2018  2:15 PM [facility] [phone] Your Appointments 8/10/2018  2:15 PM  
PROCEDURE with Francoise Plasencia MD  
[facility] [address] [place] [address]) Appt Note: cysto after [address] [facility] [address] [address]  
[phone] [address] [address] Upcoming Health Maintenance Date Due Hepatitis C Screening 1960 Pneumococcal 19-64 Highest Risk (1 of 3 - PCV13) 5/4/1981 DTaP/Tdap/Td series (1 - Tdap) 5/4/1983 FOBT Q 1 YEAR AGE 50-75 5/4/2012 Influenza Age 5 to Adult 8/1/2018 MEDICARE YEARLY EXAM 8/10/2018 Allergies as of 8/10/2018  Review Complete On: 8/10/2018 By: Jocelyn Painter No Known Allergies Current Immunizations  Never Reviewed No immunizations on file. Not reviewed this visit You Were Diagnosed With   
  
 Codes Comments Malignant neoplasm of urinary bladder, unspecified site [facility])    -  Primary ICD-10-CM: C67.9 ICD-9-CM: 188. 9 Vitals BP Pulse Height(growth percentile) Weight(growth percentile) SpO2 BMI  
 140/90 (!) 59 5' 6\" (1.676 m) 185 lb (83.9 kg) 98% 29.86 kg/m2 Smoking Status Current Every Day Smoker Vitals History BMI and BSA Data Body Mass Index Body Surface Area  
 29.86 kg/m 2 1.98 m 2 Preferred Pharmacy Pharmacy Name Phone [address], [address] [place] [address] [phone] Your Updated Medication List  
  
   
This list is accurate as of 8/10/18  2:10 PM.  Always use your most recent med list.  
  
  
  
  
 dorzolamide-timolol 22.3-6.8 mg/mL ophthalmic solution Commonly known as:  COSOPT INSTILL 1 DROP IN [facility] EYE EVERY TWELVE HOURS  
  
 gabapentin 800 mg tablet Commonly known as:  NEURONTIN Take 800 mg by mouth three (3) times daily. losartan 100 mg tablet Commonly known as:  COZAAR Take 100 mg by mouth daily. meloxicam 15 mg tablet Commonly known as:  MOBIC Take 1 tab daily or 1/2 tab twice daily as needed pain with food. methocarbamol 750 mg tablet Commonly known as:  ROBAXIN Take 1 Tab by mouth four (4) times daily as needed. Take as needed for muscle spasms  
  
 nicotine 14 mg/24 hr patch Commonly known as:  NICODERM CQ  
APPLY 1 PATCH TOPICALLY EVERY 24 HOURS FOR 6 WEEKS  
  
 ANA BCG 50 mg injection Generic drug:  BCG live 50 mg by IntraVESical route once. valACYclovir 500 mg tablet Commonly known as:  VALTREX We Performed the Following AMB POC URINALYSIS DIP STICK AUTO W/O MICRO [23073 CPT(R)] Introducing [facility] & HEALTH SERVICES! [employer] introduces [employer] patient portal. Now you can access parts of your medical record, email your doctor's office, and request medication refills online. 1. In your internet browser, go to [email or web address] 2. Click on the First Time User? Click Here link in the Sign In box. You will see the New Member Sign Up page. 3. Enter your [employer] Access Code exactly as it appears below. You will not need to use this code after youve completed the sign-up process. If you do not sign up before the expiration date, you must request a new code. · [employer] Access Code: [email or web address] Expires: 9/10/2018  8:42 AM 
 
4. Enter the last four digits of your Social Security Number (xxxx) and Date of Birth (mm/dd/yyyy) as indicated and click Submit. You will be taken to the next sign-up page. 5. Create a [employer] ID.  This will be your [employer] login ID and cannot be changed, so think of one that is secure and easy to remember. 6. Create a [employer] password. You can change your password at any time. 7. Enter your Password Reset Question and Answer. This can be used at a later time if you forget your password. 8. Enter your e-mail address. You will receive e-mail notification when new information is available in [address] 9. Click Sign Up. You can now view and download portions of your medical record. 10. Click the Download Summary menu link to download a portable copy of your medical information. If you have questions, please visit the Frequently Asked Questions section of the [employer] website. Remember, [employer] is NOT to be used for urgent needs. For medical emergencies, dial 911. Now available from your iPhone and Android! Please provide this summary of care documentation to your next provider. Your primary care clinician is listed as Suman Hinds. If you have any questions after today's visit, please call [phone].

## 2018-09-17 ENCOUNTER — DOCUMENTATION ONLY (OUTPATIENT)
Dept: UROLOGY | Age: 58
End: 2018-09-17

## 2018-09-17 NOTE — PROGRESS NOTES
Patient called today and wanted to wait until November to start his BCG again because his wife is sick and he is having his eye's done. He was rescheduled for 11/27/18 to start BCG #1.

## 2018-11-27 ENCOUNTER — OFFICE VISIT (OUTPATIENT)
Dept: UROLOGY | Age: 58
End: 2018-11-27

## 2018-11-27 VITALS
BODY MASS INDEX: 30.53 KG/M2 | SYSTOLIC BLOOD PRESSURE: 138 MMHG | WEIGHT: 190 LBS | HEIGHT: 66 IN | OXYGEN SATURATION: 98 % | DIASTOLIC BLOOD PRESSURE: 78 MMHG | HEART RATE: 50 BPM

## 2018-11-27 DIAGNOSIS — C67.9 MALIGNANT NEOPLASM OF URINARY BLADDER, UNSPECIFIED SITE (HCC): Primary | ICD-10-CM

## 2018-11-27 LAB
BILIRUB UR QL STRIP: NEGATIVE
GLUCOSE UR-MCNC: NEGATIVE MG/DL
KETONES P FAST UR STRIP-MCNC: NEGATIVE MG/DL
PH UR STRIP: 6 [PH] (ref 4.6–8)
PROT UR QL STRIP: NEGATIVE
SP GR UR STRIP: 1.01 (ref 1–1.03)
UA UROBILINOGEN AMB POC: NORMAL (ref 0.2–1)
URINALYSIS CLARITY POC: CLEAR
URINALYSIS COLOR POC: YELLOW
URINE BLOOD POC: NEGATIVE
URINE LEUKOCYTES POC: NEGATIVE
URINE NITRITES POC: NEGATIVE

## 2018-11-27 NOTE — PROGRESS NOTES
Mr. Tom Solis has a reminder for a \"due or due soon\" health maintenance. I have asked that he contact his primary care provider for follow-up on this health maintenance. [facility]  OFFICE PROCEDURE PROGRESS NOTE        Chart reviewed for the following:   IJosie LPN, have reviewed the History, Physical and updated the Allergic reactions for [place] performed immediately prior to start of procedure:   Halina Hughes LPN, have performed the following reviews on [facility] Areas prior to the start of the procedure:            * Patient was identified by name and date of birth   * Agreement on procedure being performed was verified  * Risks and Benefits explained to the patient  * Procedure site verified and marked as necessary  * Patient was positioned for comfort  * Consent was signed and verified     Time: 9:43AM      Date of procedure: 11/27/2018    Procedure performed by:  Maricruz Cowart MD    Provider assisted by: Kb Putnam LPN    Patient assisted by: self    How tolerated by patient: tolerated the procedure well with no complications    Post Procedural Pain Scale: 0 - No Hurt    Comments: none    Patient verbalized understanding of procedure and post procedure instructions.

## 2018-11-27 NOTE — PROGRESS NOTES
NARRATIVE:    The patient is a 77-year-old -American male who is known to this office with a last visit here in August.  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 Repeat resection was not done because the patient had significant stimulation reaction to the initial resection    The patient had induction BCG and is now in the middle of his series of 3 BCG installations every 3 months. His last cystoscopy was 3 months ago. The patient comes now for the first in a series of 3 BCG installations    The patient is prepped in sterile fashion. A catheter is placed to drain his urine and previously prepared BCG is instilled in routine fashion. 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 He needs another one done        PLAN:  Patient return in 1 week and we are going to do his 3-month surveillance cystoscopy along with BCG #2. We will get a PSA drawn just prior to that instrumentation, taking note that, if it is elevated, that may be a combination of the instrumentation any local BCG reaction and may simply need to be repeated      This visit exceeded 10 minutes and greater than 50% was counseling. The patient expresses understanding of the treatment plan and wishes to proceed    This dictation used voice recognition software and there may be mistakes.     Michelle Ching MD

## 2018-11-27 NOTE — PATIENT INSTRUCTIONS
Bacillus of [address] (Inside the bladder)   Bacillus of [place] (ba-SILL-us of Vasu-met and SHELL-in)  Treats or prevents bladder cancer, and used to prevent certain bladder tumors. Also called Bacillus Calmette-Monica (or BCG). Brand Name(s): Thergeovany Lynn BCG   There may be other brand names for this medicine. When This Medicine Should Not Be Used: You should not use this medicine if your immune system is weak due to other medicines or illness. You should not use this medicine if you have a fever (unless your doctor knows the cause) or if you have a bladder infection. You may not be able to receive this medicine if you have active tuberculosis, or blood in your urine. How to Use This Medicine:   Liquid  · Do not drink fluids for 4 hours before your treatment. Empty your bladder before you receive this medicine. · This medicine is given by running the liquid through a tube (catheter) into your bladder. The tube is then taken out, and you will hold the medicine in your bladder for two hours. · While this medicine is in the bladder, you should lie on your back, stomach, and each side for 15 minutes in each position. · You can get up after the first hour, but hold the medicine in your bladder for 1 more hour. If you are unable to hold it, tell your doctor or nurse. · Treatment with BCG may be given once a week for 6 weeks and approximately each month afterward for 6 to 12 months, or as your doctor orders. · Drink plenty of water during each treatment. · After each treatment, all patients (men and women) should sit down to urinate. · Any time you urinate in the first 6 hours after treatment, disinfect the urine by adding an equal amount (usually about 8 ounces, or 1 cup) of household bleach to the toilet bowl and letting it stand for 15 minutes before flushing. · A nurse or other health provider will give you this medicine.   Drugs and Foods to Avoid:   Ask your doctor or pharmacist before using any other medicine, including over-the-counter medicines, vitamins, and herbal products. · Make sure your doctor knows if you are also using a medicine to treat an infection or tuberculosis. Some tuberculosis medicines are ethambutol, isoniazid, and rifampin. Medicines to treat an infection (antibiotics) include amoxicillin, azithromycin, penicillin, Augmentin®, Bactrim®, and Cipro®. · Tell your doctor if you are using any medicine that weakens your immune system, such as steroids or cancer treatments. Some steroids are dexamethasone, prednisolone, prednisone, and Medrol®. Warnings While Using This Medicine:   · Before you begin your treatment, make sure your doctor knows if you are pregnant or breast feeding. Do not get pregnant while you are being treated with this medicine. · Tell your doctor if you have any changes in urinary habits after treatment with this medicine. Changes include urinating more or less than usual or having trouble urinating. · Some people develop infections after receiving this medicine. Call your doctor right away if you start to have flu-like symptoms. These symptoms include chills, weakness, and fever (103 degrees or higher) that lasts longer than 72 hours. · Your doctor will do lab tests at regular visits to check on the effects of this medicine. Keep all appointments. Possible Side Effects While Using This Medicine:   Call your doctor right away if you notice any of these side effects:  · Blood in your urine. · Burning or pain when you urinate. · Fever (103 degrees F or higher), chills, or extreme tiredness. · Severe skin rash. · Uncontrollable shaking or trembling. · Urinating more than usual, or feeling like you have to urinate more often. If you notice these less serious side effects, talk with your doctor:   · Cough. · Mild skin rash. · Nausea or vomiting. · Pain in your joints.   If you notice other side effects that you think are caused by this medicine, tell your doctor. Call your doctor for medical advice about side effects. You may report side effects to FDA at 7-253-LYG-6150  © 2017 [address] Information is for End User's use only and may not be sold, redistributed or otherwise used for commercial purposes. The above information is an  only. It is not intended as medical advice for individual conditions or treatments. Talk to your doctor, nurse or pharmacist before following any medical regimen to see if it is safe and effective for you.

## 2018-12-04 ENCOUNTER — OFFICE VISIT (OUTPATIENT)
Dept: UROLOGY | Age: 58
End: 2018-12-04

## 2018-12-04 VITALS
OXYGEN SATURATION: 100 % | HEART RATE: 54 BPM | WEIGHT: 188 LBS | BODY MASS INDEX: 30.22 KG/M2 | HEIGHT: 66 IN | DIASTOLIC BLOOD PRESSURE: 82 MMHG | SYSTOLIC BLOOD PRESSURE: 138 MMHG

## 2018-12-04 DIAGNOSIS — C67.9 MALIGNANT NEOPLASM OF URINARY BLADDER, UNSPECIFIED SITE (HCC): Primary | ICD-10-CM

## 2018-12-04 NOTE — PATIENT INSTRUCTIONS
Bacillus of [address] (Inside the bladder)   Bacillus of [place] (ba-SILL-us of Vasu-met and SHELL-in)  Treats or prevents bladder cancer, and used to prevent certain bladder tumors. Also called Bacillus Calmette-Monica (or BCG). Brand Name(s): Thergeovany Lynn BCG   There may be other brand names for this medicine. When This Medicine Should Not Be Used: You should not use this medicine if your immune system is weak due to other medicines or illness. You should not use this medicine if you have a fever (unless your doctor knows the cause) or if you have a bladder infection. You may not be able to receive this medicine if you have active tuberculosis, or blood in your urine. How to Use This Medicine:   Liquid  · Do not drink fluids for 4 hours before your treatment. Empty your bladder before you receive this medicine. · This medicine is given by running the liquid through a tube (catheter) into your bladder. The tube is then taken out, and you will hold the medicine in your bladder for two hours. · While this medicine is in the bladder, you should lie on your back, stomach, and each side for 15 minutes in each position. · You can get up after the first hour, but hold the medicine in your bladder for 1 more hour. If you are unable to hold it, tell your doctor or nurse. · Treatment with BCG may be given once a week for 6 weeks and approximately each month afterward for 6 to 12 months, or as your doctor orders. · Drink plenty of water during each treatment. · After each treatment, all patients (men and women) should sit down to urinate. · Any time you urinate in the first 6 hours after treatment, disinfect the urine by adding an equal amount (usually about 8 ounces, or 1 cup) of household bleach to the toilet bowl and letting it stand for 15 minutes before flushing. · A nurse or other health provider will give you this medicine.   Drugs and Foods to Avoid:   Ask your doctor or pharmacist before using any other medicine, including over-the-counter medicines, vitamins, and herbal products. · Make sure your doctor knows if you are also using a medicine to treat an infection or tuberculosis. Some tuberculosis medicines are ethambutol, isoniazid, and rifampin. Medicines to treat an infection (antibiotics) include amoxicillin, azithromycin, penicillin, Augmentin®, Bactrim®, and Cipro®. · Tell your doctor if you are using any medicine that weakens your immune system, such as steroids or cancer treatments. Some steroids are dexamethasone, prednisolone, prednisone, and Medrol®. Warnings While Using This Medicine:   · Before you begin your treatment, make sure your doctor knows if you are pregnant or breast feeding. Do not get pregnant while you are being treated with this medicine. · Tell your doctor if you have any changes in urinary habits after treatment with this medicine. Changes include urinating more or less than usual or having trouble urinating. · Some people develop infections after receiving this medicine. Call your doctor right away if you start to have flu-like symptoms. These symptoms include chills, weakness, and fever (103 degrees or higher) that lasts longer than 72 hours. · Your doctor will do lab tests at regular visits to check on the effects of this medicine. Keep all appointments. Possible Side Effects While Using This Medicine:   Call your doctor right away if you notice any of these side effects:  · Blood in your urine. · Burning or pain when you urinate. · Fever (103 degrees F or higher), chills, or extreme tiredness. · Severe skin rash. · Uncontrollable shaking or trembling. · Urinating more than usual, or feeling like you have to urinate more often. If you notice these less serious side effects, talk with your doctor:   · Cough. · Mild skin rash. · Nausea or vomiting. · Pain in your joints.   If you notice other side effects that you think are caused by this medicine, tell your doctor. Call your doctor for medical advice about side effects. You may report side effects to FDA at 0-554-DHO-1387  © 2017 [facility] Information is for End User's use only and may not be sold, redistributed or otherwise used for commercial purposes. The above information is an  only. It is not intended as medical advice for individual conditions or treatments. Talk to your doctor, nurse or pharmacist before following any medical regimen to see if it is safe and effective for you.

## 2018-12-04 NOTE — PROGRESS NOTES
Mr. Tam Han has a reminder for a \"due or due soon\" health maintenance. I have asked that he contact his primary care provider for follow-up on this health maintenance. [facility]  OFFICE PROCEDURE PROGRESS NOTE        Chart reviewed for the following:   IMeño LPN, have reviewed the History, Physical and updated the Allergic reactions for [place] performed immediately prior to start of procedure:   Marie Zurita LPN, have performed the following [facility] to the start of the procedure:            * Patient was identified by name and date of birth   * Agreement on procedure being performed was verified  * Risks and Benefits explained to the patient  * Procedure site verified and marked as necessary  * Patient was positioned for comfort  * Consent was signed and verified     Time: 9:21AM      Date of procedure: 12/4/2018    Procedure performed by:  John Brooke MD    Provider assisted by: Delores Bhatt LPN    Patient assisted by: self    How tolerated by patient: tolerated the procedure well with no complications    Post Procedural Pain Scale: 0 - No Hurt    Comments: none    Patient verbalized understanding of procedure and post procedure instructions.

## 2018-12-04 NOTE — PROGRESS NOTES
The patient is prepared for cystoscopy. He has been counseled regarding the risks which include, but are not limited to, infection, bleeding, injury, failure to diagnose, and possible need for additional procedures. He wishes to proceed, giving his informed consent. Procedure note: The patient is in the slightly inclined supine position where he is prepped and draped in sterile fashion. Lidocaine jelly has been inserted into the urethra and a suitable time has been permitted to elapse for establishment of anesthesia. A flexible cystoscope is inserted and video cystoscopy is carried out with the patient able to observe the monitor. The findings are as follows:  1. Pendulous urethra normal  2. Bulbar urethra normal  3. External sphincter normal  4. Prostate   5. Bladder neck  6. Trigone  7. Bladder wall all above structures are normal without evidence of epithelial neoplasm    Pertinent observations are pointed out to the patient and all the patient's questions are entertained. The procedure is terminated . Full discussion regarding the cystoscopic findings will be carried out. Following removal of the cystoscope, the patient is left in position    Straight catheter is placed and the bladder is drained of its fluid content.   Previously prepared BCG is instilled in the routine manner and the patient will follow routine protocol instructions and will return in 1 week for his third in a series of 3 BCG instillations

## 2018-12-11 ENCOUNTER — OFFICE VISIT (OUTPATIENT)
Dept: UROLOGY | Age: 58
End: 2018-12-11

## 2018-12-11 VITALS
DIASTOLIC BLOOD PRESSURE: 73 MMHG | OXYGEN SATURATION: 97 % | HEIGHT: 66 IN | WEIGHT: 188 LBS | BODY MASS INDEX: 30.22 KG/M2 | HEART RATE: 53 BPM | SYSTOLIC BLOOD PRESSURE: 122 MMHG

## 2018-12-11 DIAGNOSIS — C67.9 MALIGNANT NEOPLASM OF URINARY BLADDER, UNSPECIFIED SITE (HCC): Primary | ICD-10-CM

## 2018-12-11 NOTE — PROGRESS NOTES
Mr. Jorge Luis Maurice has a reminder for a \"due or due soon\" health maintenance. I have asked that he contact his primary care provider for follow-up on this health maintenance.

## 2018-12-11 NOTE — PROCEDURES
Penis is prepped and draped in a sterile fashion and coudé tip catheter is inserted. The bladder is drained of clear urine. Freshly prepared BCG, 50 cc, is instilled into the bladder. The usual post procedure instructions are given and we will see him in 3 months.

## 2018-12-11 NOTE — PATIENT INSTRUCTIONS
Bacillus of [place] ([place] BCG) - (Inside the bladder)   Why this medicine is used:   Treats or prevents bladder cancer, and used to prevent certain bladder tumors. Also called Bacillus Calmette-Monica (or BCG). Contact a nurse or doctor right away if you have:  · Blood in your urine, or burning or pain when you urinate  · Urinating more than usual, or feeling like you have to urinate more often  · Fever (103 degrees F or higher), chills, or extreme tiredness  · Severe skin rash  · Severe shaking or trembling     Common side effects:  · Cough, nausea, or vomiting  · Mild skin rash. · Pain in your joints. © 2017 [address] Information is for End User's use only and may not be sold, redistributed or otherwise used for commercial purposes.
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
## 2018-12-29 NOTE — ADDENDUM NOTE
A&O, at times forgetful. [place] wears hearing aids. VSS on RA, ambulates with 1x and cane. Pain controlled with scheduled Tylenol and prn oxycodone. Lidocaine patch removed am. Please offer prn oxycodone every 4 hours, pt can refuse if not in pain. Important to establish a home pain therapy plan. Pt tolerates regular diet. Will continue to monitor.   Addended by: Latonia Paget on: 8/13/2018 09:40 AM     Modules accepted: Orders ogen      Last Written Prescription Date: 02/15/17  Last Fill Quantity: 30, # refills: 1  Last Office Visit with [facility] or [facility] prescribing provider: 12/16 physical/breast exam.       BP Readings from Last 3 Encounters:   12/26/16 94/59   11/21/16 97/68   11/01/16 109/75     Date of last Breast Exam: 12/16  Mammogram:01/03/17  Prescription approved per [facility] Refill Protocol.  Patrica Langley, RN  Triage Nurse

## 2019-03-05 ENCOUNTER — OFFICE VISIT (OUTPATIENT)
Dept: UROLOGY | Age: 59
End: 2019-03-05

## 2019-03-05 VITALS
SYSTOLIC BLOOD PRESSURE: 137 MMHG | BODY MASS INDEX: 30.22 KG/M2 | HEIGHT: 66 IN | OXYGEN SATURATION: 100 % | HEART RATE: 54 BPM | DIASTOLIC BLOOD PRESSURE: 84 MMHG | WEIGHT: 188 LBS

## 2019-03-05 DIAGNOSIS — C67.9 MALIGNANT NEOPLASM OF URINARY BLADDER, UNSPECIFIED SITE (HCC): Primary | ICD-10-CM

## 2019-03-05 LAB
BILIRUB UR QL STRIP: NEGATIVE
GLUCOSE UR-MCNC: NEGATIVE MG/DL
KETONES P FAST UR STRIP-MCNC: NEGATIVE MG/DL
PH UR STRIP: 6.5 [PH] (ref 4.6–8)
PROT UR QL STRIP: NEGATIVE
SP GR UR STRIP: 1.01 (ref 1–1.03)
UA UROBILINOGEN AMB POC: NORMAL (ref 0.2–1)
URINALYSIS CLARITY POC: CLEAR
URINALYSIS COLOR POC: YELLOW
URINE BLOOD POC: NEGATIVE
URINE LEUKOCYTES POC: NEGATIVE
URINE NITRITES POC: NEGATIVE

## 2019-03-05 NOTE — PROGRESS NOTES
[facility]  OFFICE PROCEDURE PROGRESS NOTE        Chart reviewed for the following:   John PETTIT LPN, have reviewed the History, Physical and updated the Allergic reactions for [place] performed immediately prior to start of procedure:   Fadia Acevedo LPN, have performed the following reviews on Danyelle Larson prior to the start of the procedure:            * Patient was identified by name and date of birth   * Agreement on procedure being performed was verified  * Risks and Benefits explained to the patient  * Procedure site verified and marked as necessary  * Patient was positioned for comfort  * Consent was signed and verified     Time: 9:26AM      Date of procedure: 3/5/2019    Procedure performed by:  Imtiaz Manrique MD    Provider assisted by: Kathi Scott LPN    Patient assisted by: self    How tolerated by patient: tolerated the procedure well with no complications    Post Procedural Pain Scale: 0 - No Hurt    Comments: none    Patient verbalized understanding of procedure and post procedure instructions.

## 2019-03-05 NOTE — PROGRESS NOTES
Mr. Ly Herrera has a reminder for a \"due or due soon\" health maintenance. I have asked that he contact his primary care provider for follow-up on this health maintenance.

## 2019-03-05 NOTE — PATIENT INSTRUCTIONS
Cystoscopy: Care Instructions  Your Care Instructions  Cystoscopy is a test. It uses a thin, lighted tube called a cystoscope to see the inside of the bladder and the urethra. The urethra is the tube that carries urine out of the body. This test is helpful because it lets your doctor see areas of your bladder and urethra that are hard to see on X-rays. It can help your doctor find bladder stones, tumors, bleeding, and infection. During this test, your doctor also can take tissue and urine samples. And if your doctor finds small stones or growths, he or she can remove them. In most cases the scope is in the bladder for less than 10 minutes. But the entire test may take 45 minutes or longer. You will probably get local anesthesia. This numbs a small part of your body. Or you may get spinal anesthesia, which numbs more of your body. Once in a while, doctors use general anesthesia. It makes you sleep during surgery. If you get a local anesthetic, you may be able to get up right after the test. But if you had spinal or general anesthesia, you will stay in the recovery room until you are able to walk or you have feeling again in your lower body. This usually takes about an hour. Your doctor may be able to tell you some of the results right after the test. But the complete results may take several days. Follow-up care is a key part of your treatment and safety. Be sure to make and go to all appointments, and call your doctor if you are having problems. It's also a good idea to know your test results and keep a list of the medicines you take. How can you care for yourself at home? Before the test  · If you are having a local anesthetic, you can eat and drink before the test.  · If you are having a spinal or general anesthetic, do not eat or drink anything for at least 8 hours before the test. Tell your doctor what medicines you take.   · If you are not staying overnight in the hospital, make sure you have someone who can drive you home after the test.  After the test  · If your doctor prescribed antibiotics, take them as directed. Do not stop taking them just because you feel better. You need to take the full course of antibiotics. · You may have some burning when you urinate for a day or two after the test. You may feel better if you drink more fluids. This may also help prevent an infection. · Your urine may have a pinkish color for a few days after the test.  When should you call for help? Call your doctor now or seek immediate medical care if:    · Your urine is still red or you see blood clots after you have urinated several times.     · You cannot pass urine 8 hours after the test.     · You get a fever or chills.     · You have pain in your belly or your back just below your rib cage. This is also called flank pain.    Watch closely for changes in your health, and be sure to contact your doctor if:    · You have pain or burning when you urinate. A burning sensation is normal for a day or two after the test. But call if it does not get better.     · You have a frequent urge to urinate but can pass only small amounts of urine.     · Your urine is pink, red, or cloudy or smells bad. It is normal for the urine to have a pinkish color for a few days after the test. But call if it does not get better.     · You do not get better as expected. Where can you learn more? Go to http://tramaine-alis.info/. Enter R086 in the search box to learn more about \"Cystoscopy: Care Instructions. \"  Current as of: March 20, 2018  Content Version: 11.9  © 9843-1842 [employer]. Care instructions adapted under license by [employer] (which disclaims liability or warranty for this information).  If you have questions about a medical condition or this instruction, always ask your healthcare professional. [address] any warranty or liability for your use of this information.

## 2019-03-05 NOTE — PROGRESS NOTES
The patient is prepared for cystoscopy. He has been counseled regarding the risks which include, but are not limited to, infection, bleeding, injury, failure to diagnose, and possible need for additional procedures. He wishes to proceed, giving his informed consent. Procedure note: The patient is in the slightly inclined supine position where he is prepped and draped in sterile fashion. Lidocaine jelly has been inserted into the urethra and a suitable time has been permitted to elapse for establishment of anesthesia. A flexible cystoscope is inserted and video cystoscopy is carried out with the patient able to observe the monitor. The findings are as follows:  1. Pendulous urethra normal  2. Bulbar urethra normal  3. External sphincter normal  4. Prostate   5. Bladder neck  6. Trigone  7. Bladder wall all above structures are normal with no evidence of epithelial recurrence  Pertinent observations are pointed out to the patient and all the patient's questions are entertained. The procedure is terminated . Straight catheter is then placed in the urine and excess irrigation are removed from the bladder. A previously prepared BCG is instilled into the bladder in routine fashion. The patient will follow postoperative protocol    The patient's first and only positive pathology was in June 2017. The patient has received the first in his series of 3 monthly BCG installations. He will return next week for BCG 2 and BCG 3 will come the week after that. The patient will then return in June for his final 3-month cystoscopy and his final series of 3 BCG installations.   After that, the BCG will be stopped and the patient will be then moved out to 6-month surveillance cystoscopy protocol

## 2019-03-12 ENCOUNTER — OFFICE VISIT (OUTPATIENT)
Dept: UROLOGY | Age: 59
End: 2019-03-12

## 2019-03-12 VITALS
BODY MASS INDEX: 30.22 KG/M2 | HEIGHT: 66 IN | DIASTOLIC BLOOD PRESSURE: 78 MMHG | HEART RATE: 58 BPM | WEIGHT: 188 LBS | SYSTOLIC BLOOD PRESSURE: 115 MMHG | OXYGEN SATURATION: 98 %

## 2019-03-12 DIAGNOSIS — C67.9 MALIGNANT NEOPLASM OF URINARY BLADDER, UNSPECIFIED SITE (HCC): Primary | ICD-10-CM

## 2019-03-12 LAB
BILIRUB UR QL STRIP: NEGATIVE
GLUCOSE UR-MCNC: NEGATIVE MG/DL
KETONES P FAST UR STRIP-MCNC: NEGATIVE MG/DL
PH UR STRIP: 7.5 [PH] (ref 4.6–8)
PROT UR QL STRIP: NEGATIVE
SP GR UR STRIP: 1.02 (ref 1–1.03)
UA UROBILINOGEN AMB POC: NORMAL (ref 0.2–1)
URINALYSIS CLARITY POC: CLEAR
URINALYSIS COLOR POC: YELLOW
URINE BLOOD POC: NEGATIVE
URINE LEUKOCYTES POC: NORMAL
URINE NITRITES POC: NEGATIVE

## 2019-03-12 NOTE — PATIENT INSTRUCTIONS
Bacillus of [address] (Inside the bladder)   Bacillus of [place] and [place] (ba-SILL-us of Vasu-met and SHELL-in)  Treats or prevents bladder cancer, and used to prevent certain bladder tumors. Also called Bacillus Calmette-Monica (or BCG). Brand Name(s): Thergeovany Lynn BCG   There may be other brand names for this medicine. When This Medicine Should Not Be Used: You should not use this medicine if your immune system is weak due to other medicines or illness. You should not use this medicine if you have a fever (unless your doctor knows the cause) or if you have a bladder infection. You may not be able to receive this medicine if you have active tuberculosis, or blood in your urine. How to Use This Medicine:   Liquid  · Do not drink fluids for 4 hours before your treatment. Empty your bladder before you receive this medicine. · This medicine is given by running the liquid through a tube (catheter) into your bladder. The tube is then taken out, and you will hold the medicine in your bladder for two hours. · While this medicine is in the bladder, you should lie on your back, stomach, and each side for 15 minutes in each position. · You can get up after the first hour, but hold the medicine in your bladder for 1 more hour. If you are unable to hold it, tell your doctor or nurse. · Treatment with BCG may be given once a week for 6 weeks and approximately each month afterward for 6 to 12 months, or as your doctor orders. · Drink plenty of water during each treatment. · After each treatment, all patients (men and women) should sit down to urinate. · Any time you urinate in the first 6 hours after treatment, disinfect the urine by adding an equal amount (usually about 8 ounces, or 1 cup) of household bleach to the toilet bowl and letting it stand for 15 minutes before flushing. · A nurse or other health provider will give you this medicine.   Drugs and Foods to Avoid:   Ask your doctor or pharmacist before using any other medicine, including over-the-counter medicines, vitamins, and herbal products. · Make sure your doctor knows if you are also using a medicine to treat an infection or tuberculosis. Some tuberculosis medicines are ethambutol, isoniazid, and rifampin. Medicines to treat an infection (antibiotics) include amoxicillin, azithromycin, penicillin, Augmentin®, Bactrim®, and Cipro®. · Tell your doctor if you are using any medicine that weakens your immune system, such as steroids or cancer treatments. Some steroids are dexamethasone, prednisolone, prednisone, and Medrol®. Warnings While Using This Medicine:   · Before you begin your treatment, make sure your doctor knows if you are pregnant or breast feeding. Do not get pregnant while you are being treated with this medicine. · Tell your doctor if you have any changes in urinary habits after treatment with this medicine. Changes include urinating more or less than usual or having trouble urinating. · Some people develop infections after receiving this medicine. Call your doctor right away if you start to have flu-like symptoms. These symptoms include chills, weakness, and fever (103 degrees or higher) that lasts longer than 72 hours. · Your doctor will do lab tests at regular visits to check on the effects of this medicine. Keep all appointments. Possible Side Effects While Using This Medicine:   Call your doctor right away if you notice any of these side effects:  · Blood in your urine. · Burning or pain when you urinate. · Fever (103 degrees F or higher), chills, or extreme tiredness. · Severe skin rash. · Uncontrollable shaking or trembling. · Urinating more than usual, or feeling like you have to urinate more often. If you notice these less serious side effects, talk with your doctor:   · Cough. · Mild skin rash. · Nausea or vomiting. · Pain in your joints.   If you notice other side effects that you think are caused by this medicine, tell your doctor. Call your doctor for medical advice about side effects. You may report side effects to FDA at 7-123-ATW-8988  © 2017 [facility] Information is for End User's use only and may not be sold, redistributed or otherwise used for commercial purposes. The above information is an  only. It is not intended as medical advice for individual conditions or treatments. Talk to your doctor, nurse or pharmacist before following any medical regimen to see if it is safe and effective for you.

## 2019-03-12 NOTE — PROGRESS NOTES
Mr. Bowman Sonya has a reminder for a \"due or due soon\" health maintenance. I have asked that he contact his primary care provider for follow-up on this health maintenance.

## 2019-03-12 NOTE — PROGRESS NOTES
[facility]  OFFICE PROCEDURE PROGRESS NOTE        Chart reviewed for the following:   Michela PETTIT LPN, have reviewed the History, Physical and updated the Allergic reactions for [place] performed immediately prior to start of procedure:   Donovan Aase, LPN, have performed the following reviews on Celester Harries prior to the start of the procedure:            * Patient was identified by name and date of birth   * Agreement on procedure being performed was verified  * Risks and Benefits explained to the patient  * Procedure site verified and marked as necessary  * Patient was positioned for comfort  * Consent was signed and verified     Time: 4:35PM      Date of procedure: 3/12/2019    Procedure performed by:  Chastity Berry MD    Provider assisted by: Eric Gallagher LPN    Patient assisted by: self    How tolerated by patient: tolerated the procedure well with no complications    Post Procedural Pain Scale: 0 - No Hurt    Comments: none    Patient verbalized understanding of procedure and post procedure instructions.

## 2019-03-12 NOTE — PROGRESS NOTES
Lab Results   Component Value Date/Time    Prostate Specific Ag 0.4 04/04/2017 03:27 PM    NARRATIVE:    The patient is prepped in sterile fashion. A straight catheter was used to drain the urine and a previously prepared BCG is instilled in the bladder in routine fashion. The patient will follow postprocedure protocol and return in 1 week                    IMPRESSION:    Bladder cancer undergoing maintenance BCG        PLAN: Patient will return in 1 week for his third in this series of 3 BCG treatments  The patient will then have another series of 3 BCG installations and we need to make sure he gets a PSA drawn before beginning that next series of BCG or his cystoscopy, GFR is scheduled first      This visit exceeded 10 minutes and greater than 50% was counseling. The patient expresses understanding of the treatment plan and wishes to proceed    This dictation used voice recognition software and there may be mistakes.     Ashley Acevedo MD

## 2019-03-19 ENCOUNTER — OFFICE VISIT (OUTPATIENT)
Dept: UROLOGY | Age: 59
End: 2019-03-19

## 2019-03-19 VITALS
WEIGHT: 188 LBS | OXYGEN SATURATION: 98 % | HEIGHT: 66 IN | BODY MASS INDEX: 30.22 KG/M2 | SYSTOLIC BLOOD PRESSURE: 127 MMHG | DIASTOLIC BLOOD PRESSURE: 69 MMHG | HEART RATE: 51 BPM

## 2019-03-19 DIAGNOSIS — C67.9 MALIGNANT NEOPLASM OF URINARY BLADDER, UNSPECIFIED SITE (HCC): Primary | ICD-10-CM
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
## 2019-03-19 NOTE — PROCEDURES
Under satisfactory skin prep, the bladder was drained with a Wright catheter. 50 cc of freshly prepared BCG was instilled into the urinary bladder in the usual post instructions were given. Return in 3 months.

## 2019-03-19 NOTE — PATIENT INSTRUCTIONS
Bacillus of [address] (Inside the bladder)   Bacillus of [place] (ba-SILL-us of Vasu-met and SHELL-in)  Treats or prevents bladder cancer, and used to prevent certain bladder tumors. Also called Bacillus Calmette-Monica (or BCG). Brand Name(s): Thergeovany Lynn BCG   There may be other brand names for this medicine. When This Medicine Should Not Be Used: You should not use this medicine if your immune system is weak due to other medicines or illness. You should not use this medicine if you have a fever (unless your doctor knows the cause) or if you have a bladder infection. You may not be able to receive this medicine if you have active tuberculosis, or blood in your urine. How to Use This Medicine:   Liquid  · Do not drink fluids for 4 hours before your treatment. Empty your bladder before you receive this medicine. · This medicine is given by running the liquid through a tube (catheter) into your bladder. The tube is then taken out, and you will hold the medicine in your bladder for two hours. · While this medicine is in the bladder, you should lie on your back, stomach, and each side for 15 minutes in each position. · You can get up after the first hour, but hold the medicine in your bladder for 1 more hour. If you are unable to hold it, tell your doctor or nurse. · Treatment with BCG may be given once a week for 6 weeks and approximately each month afterward for 6 to 12 months, or as your doctor orders. · Drink plenty of water during each treatment. · After each treatment, all patients (men and women) should sit down to urinate. · Any time you urinate in the first 6 hours after treatment, disinfect the urine by adding an equal amount (usually about 8 ounces, or 1 cup) of household bleach to the toilet bowl and letting it stand for 15 minutes before flushing. · A nurse or other health provider will give you this medicine.   Drugs and Foods to Avoid:   Ask your doctor or pharmacist before using any other medicine, including over-the-counter medicines, vitamins, and herbal products. · Make sure your doctor knows if you are also using a medicine to treat an infection or tuberculosis. Some tuberculosis medicines are ethambutol, isoniazid, and rifampin. Medicines to treat an infection (antibiotics) include amoxicillin, azithromycin, penicillin, Augmentin®, Bactrim®, and Cipro®. · Tell your doctor if you are using any medicine that weakens your immune system, such as steroids or cancer treatments. Some steroids are dexamethasone, prednisolone, prednisone, and Medrol®. Warnings While Using This Medicine:   · Before you begin your treatment, make sure your doctor knows if you are pregnant or breast feeding. Do not get pregnant while you are being treated with this medicine. · Tell your doctor if you have any changes in urinary habits after treatment with this medicine. Changes include urinating more or less than usual or having trouble urinating. · Some people develop infections after receiving this medicine. Call your doctor right away if you start to have flu-like symptoms. These symptoms include chills, weakness, and fever (103 degrees or higher) that lasts longer than 72 hours. · Your doctor will do lab tests at regular visits to check on the effects of this medicine. Keep all appointments. Possible Side Effects While Using This Medicine:   Call your doctor right away if you notice any of these side effects:  · Blood in your urine. · Burning or pain when you urinate. · Fever (103 degrees F or higher), chills, or extreme tiredness. · Severe skin rash. · Uncontrollable shaking or trembling. · Urinating more than usual, or feeling like you have to urinate more often. If you notice these less serious side effects, talk with your doctor:   · Cough. · Mild skin rash. · Nausea or vomiting. · Pain in your joints.   If you notice other side effects that you think are caused by this medicine, tell your doctor. Call your doctor for medical advice about side effects. You may report side effects to FDA at 4-134-SUG-5328  © 2017 [address] Information is for End User's use only and may not be sold, redistributed or otherwise used for commercial purposes. The above information is an  only. It is not intended as medical advice for individual conditions or treatments. Talk to your doctor, nurse or pharmacist before following any medical regimen to see if it is safe and effective for you.

## 2019-03-19 NOTE — PROGRESS NOTES
Mr. Nona Mosley has a reminder for a \"due or due soon\" health maintenance. I have asked that he contact his primary care provider for follow-up on this health maintenance. [facility]  OFFICE PROCEDURE PROGRESS NOTE        Chart reviewed for the following:   Everette PETTIT LPN, have reviewed the History, Physical and updated the Allergic reactions for [place] performed immediately prior to start of procedure:   Estevan Masters LPN, have performed the following reviews on Lashae Sweet prior to the start of the procedure:            * Patient was identified by name and date of birth   * Agreement on procedure being performed was verified  * Risks and Benefits explained to the patient  * Procedure site verified and marked as necessary  * Patient was positioned for comfort  * Consent was signed and verified     Time: 9:34AM      Date of procedure: 3/19/2019    Procedure performed by:  Graham Rosen MD    Provider assisted by: Brooke Boston LPN    Patient assisted by: self    How tolerated by patient: tolerated the procedure well with no complications    Post Procedural Pain Scale: 0 - No Hurt    Comments: none    Patient verbalized understanding of procedure and post procedure instructions.

## 2019-06-11 ENCOUNTER — OFFICE VISIT (OUTPATIENT)
Dept: UROLOGY | Age: 59
End: 2019-06-11

## 2019-06-11 VITALS
DIASTOLIC BLOOD PRESSURE: 78 MMHG | WEIGHT: 183 LBS | HEIGHT: 66 IN | HEART RATE: 48 BPM | BODY MASS INDEX: 29.41 KG/M2 | OXYGEN SATURATION: 100 % | SYSTOLIC BLOOD PRESSURE: 110 MMHG

## 2019-06-11 DIAGNOSIS — Z12.5 SCREENING FOR PROSTATE CANCER: ICD-10-CM

## 2019-06-11 DIAGNOSIS — C67.9 MALIGNANT NEOPLASM OF URINARY BLADDER, UNSPECIFIED SITE (HCC): Primary | ICD-10-CM

## 2019-06-11 NOTE — PROGRESS NOTES
[facility]  OFFICE PROCEDURE PROGRESS NOTE        Chart reviewed for the following:   IJessica, have reviewed the History, Physical and updated the Allergic reactions for [place] performed immediately prior to start of procedure:   Bjorn Chavez, have performed the following reviews on Tamea Sires prior to the start of the procedure:            * Patient was identified by name and date of birth   * Agreement on procedure being performed was verified  * Risks and Benefits explained to the patient  * Procedure site verified and marked as necessary  * Patient was positioned for comfort  * Consent was signed and verified     Time: 9:20 AM      Date of procedure: 6/11/2019    Procedure performed by:  Pradeep Marley MD    Provider assisted by: Jessica Alonso MA    Patient assisted by: self    How tolerated by patient: tolerated the procedure well with no complications    Post Procedural Pain Scale: 0 - No Hurt    Comments: Patient verbalized understanding of procedure and post procedure instructions.

## 2019-06-11 NOTE — PATIENT INSTRUCTIONS
Bladder Cancer: Care Instructions  Your Care Instructions    Bladder cancer occurs when abnormal cells grow out of control in the bladder. It usually can be cured when it is found early. It is more common in older people. Treatment may include surgery to remove part of the bladder. If the tumor is large, the entire bladder may be removed. You may also have radiation or chemotherapy to kill the cancer cells. Sometimes people get treatment with medicines that help the body's natural defenses, or immune system, fight the cancer. Finding out that you have cancer is scary. You may feel many emotions and may need some help coping. Seek out family, friends, and counselors for support. You also can do things at home to make yourself feel better while you go through treatment. Call the [employer] (2-173.542.9405) or visit its website at [employer] for more information. Follow-up care is a key part of your treatment and safety. Be sure to make and go to all appointments, and call your doctor if you are having problems. It's also a good idea to know your test results and keep a list of the medicines you take. How can you care for yourself at home? · Take your medicines exactly as prescribed. Call your doctor if you think you are having a problem with your medicine. You may get medicine for nausea and vomiting if you have these side effects. · Eat healthy food. If you are not hungry, try to eat food that has protein and extra calories to keep up your strength and prevent weight loss. Drink liquid meal replacements for extra calories and protein. Try to eat your main meal early. · Get some physical activity every day, but do not get too tired. Keep doing the hobbies you enjoy as your energy allows. · Take steps to control your stress and workload. Learn relaxation techniques. ? Share your feelings. Stress and tension affect our emotions.  By expressing your feelings to others, you may be able to understand and cope with them. ? Consider joining a support group. Talking about a problem with your spouse, a good friend, or other people with similar problems is a good way to reduce tension and stress. ? Express yourself through art. Try writing, dance, art, or crafts to relieve tension. Some dance, writing, or art groups may be available just for people who have cancer. ? Be kind to your body and mind. Getting enough sleep, eating a healthy diet, and taking time to do things you enjoy can contribute to an overall feeling of balance in your life and help reduce stress. ? Get help if you need it. Discuss your concerns with your doctor or counselor. · If you are vomiting or have diarrhea:  ? Drink plenty of fluids (enough so that your urine is light yellow or clear like water) to prevent dehydration. Choose water and other caffeine-free clear liquids. If you have kidney, heart, or liver disease and have to limit fluids, talk with your doctor before you increase the amount of fluids you drink. ? When you are able to eat, try clear soups, mild foods, and liquids until all symptoms are gone for 12 to 48 hours. Other good choices include dry toast, crackers, cooked cereal, and gelatin dessert, such as Jell-O.  · Take care of your urinary tract to prevent problems such as infection, which can be caused by bladder cancer and its treatment. Limit drinks with caffeine, drink plenty of fluids, and urinate every 3 to 4 hours. · If you have not already done so, prepare a list of advance directives. Advance directives are instructions to your doctor and family members about what kind of care you want if you become unable to speak for yourself. When should you call for help? Call your doctor now or seek immediate medical care if:    · You have pain that does not get better after taking pain medicine.     · You have symptoms of a kidney infection. These may include:  ? Pain or burning when you urinate.   ? A frequent need to urinate without being able to pass much urine. ? Pain in the flank, which is just below the rib cage and above the waist on either side of the back. ? Blood in your urine. ? A fever.    Watch closely for changes in your health, and be sure to contact your doctor if:    · You do not get better as expected. Where can you learn more? Go to http://tramaine-alis.info/. Enter M796 in the search box to learn more about \"Bladder Cancer: Care Instructions. \"  Current as of: March 27, 2018  Content Version: 11.9  © 7943-4400 [employer]. Care instructions adapted under license by [employer] (which disclaims liability or warranty for this information). If you have questions about a medical condition or this instruction, always ask your healthcare professional. [address] any warranty or liability for your use of this information.

## 2019-06-11 NOTE — PROGRESS NOTES
Mr. Deepthi Kaplan has a reminder for a \"due or due soon\" health maintenance. I have asked that he contact his primary care provider for follow-up on this health maintenance.

## 2019-06-11 NOTE — PROGRESS NOTES
The patient is prepared for cystoscopy. He has been counseled regarding the risks which include, but are not limited to, infection, bleeding, injury, failure to diagnose, and possible need for additional procedures. He wishes to proceed, giving his informed consent. Procedure note: The patient is in the slightly inclined supine position where he is prepped and draped in sterile fashion. Lidocaine jelly has been inserted into the urethra and a suitable time has been permitted to elapse for establishment of anesthesia. A flexible cystoscope is inserted and video cystoscopy is carried out with the patient able to observe the monitor. The findings are as follows:  1. Pendulous urethra normal  2. Bulbar urethra normal  3. External sphincter normal  4. Prostate   5. Bladder neck  6. Trigone  7. Bladder wall all above structures are normal and appropriate for age and there is no evidence of epithelial recurrence    Pertinent observations are pointed out to the patient and all the patient's questions are entertained. The procedure is terminated . Full discussion regarding the cystoscopic findings will be carried out. Following removal of the cystoscope, the patient is left in position for a suitable period of time and allowed to dress. Post procedure instructions are given. The patient tolerated the procedure well. The patient had a PSA drawn prior to the cystoscopy. We will call him with that result    The patient is now 2 years following his last and only pathology. I have discussed in detail with the patient the current difficulty we have with obtaining BCG and I believe that it is reasonable to not proceed with an additional that of 3 BCG weekly installations.   The patient will not be converted to 6-month surveillance cystoscopy

## 2019-06-12 LAB — PSA SERPL-MCNC: 1.1 NG/ML (ref 0–4)

## 2019-06-13 ENCOUNTER — DOCUMENTATION ONLY (OUTPATIENT)
Dept: UROLOGY | Age: 59
End: 2019-06-13

## 2019-06-13 NOTE — PROGRESS NOTES
Left voicemail message that the patient's PSA is 1.1 and the last we had was 2 years ago at 0.4 that represents nearly a tripling of the baseline in a 2-year period of time.   I left a message indicating that I would like to recheck a regular PSA when the patient comes in for his 6-month cystoscopy

## 2020-07-27 ENCOUNTER — OFFICE VISIT (OUTPATIENT)
Dept: ORTHOPEDIC SURGERY | Age: 60
End: 2020-07-27

## 2020-07-27 VITALS
RESPIRATION RATE: 15 BRPM | WEIGHT: 174 LBS | SYSTOLIC BLOOD PRESSURE: 128 MMHG | HEIGHT: 66 IN | DIASTOLIC BLOOD PRESSURE: 84 MMHG | BODY MASS INDEX: 27.97 KG/M2 | TEMPERATURE: 97.9 F | HEART RATE: 61 BPM

## 2020-07-27 DIAGNOSIS — M47.812 OSTEOARTHRITIS OF CERVICAL SPINE, UNSPECIFIED SPINAL OSTEOARTHRITIS COMPLICATION STATUS: Primary | ICD-10-CM

## 2020-07-27 RX ORDER — HYDROCODONE BITARTRATE AND ACETAMINOPHEN 7.5; 325 MG/1; MG/1
1 TABLET ORAL
Qty: 21 TAB | Refills: 0 | Status: SHIPPED | OUTPATIENT
Start: 2020-07-27 | End: 2020-08-11
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
## 2023-01-27 ENCOUNTER — HOSPITAL ENCOUNTER (OUTPATIENT)
Dept: PHYSICAL THERAPY | Age: 63
Discharge: HOME | End: 2023-01-27
Payer: MEDICARE

## 2023-01-27 PROCEDURE — 97162 PT EVAL MOD COMPLEX 30 MIN: CPT
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
## 2023-02-01 ENCOUNTER — HOSPITAL ENCOUNTER (OUTPATIENT)
Dept: PHYSICAL THERAPY | Age: 63
Discharge: HOME | End: 2023-02-01
Payer: MEDICARE

## 2023-02-01 PROCEDURE — 97140 MANUAL THERAPY 1/> REGIONS: CPT

## 2023-02-01 PROCEDURE — 97110 THERAPEUTIC EXERCISES: CPT

## 2023-02-01 PROCEDURE — 97530 THERAPEUTIC ACTIVITIES: CPT
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
## 2023-02-03 ENCOUNTER — HOSPITAL ENCOUNTER (OUTPATIENT)
Dept: PHYSICAL THERAPY | Age: 63
Discharge: HOME | End: 2023-02-03
Payer: MEDICARE

## 2023-02-03 PROCEDURE — 97140 MANUAL THERAPY 1/> REGIONS: CPT

## 2023-02-03 PROCEDURE — 97530 THERAPEUTIC ACTIVITIES: CPT
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
## 2023-02-08 ENCOUNTER — HOSPITAL ENCOUNTER (OUTPATIENT)
Dept: PHYSICAL THERAPY | Age: 63
Discharge: HOME | End: 2023-02-08
Payer: MEDICARE

## 2023-02-08 PROCEDURE — 97112 NEUROMUSCULAR REEDUCATION: CPT

## 2023-02-08 PROCEDURE — 97140 MANUAL THERAPY 1/> REGIONS: CPT

## 2023-02-08 PROCEDURE — 97110 THERAPEUTIC EXERCISES: CPT
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
## 2023-02-10 ENCOUNTER — HOSPITAL ENCOUNTER (OUTPATIENT)
Dept: PHYSICAL THERAPY | Age: 63
Discharge: HOME | End: 2023-02-10
Payer: MEDICARE

## 2023-02-10 PROCEDURE — 97530 THERAPEUTIC ACTIVITIES: CPT

## 2023-02-10 PROCEDURE — 97140 MANUAL THERAPY 1/> REGIONS: CPT

## 2023-02-10 PROCEDURE — 97110 THERAPEUTIC EXERCISES: CPT
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
## 2023-02-14 ENCOUNTER — HOSPITAL ENCOUNTER (OUTPATIENT)
Facility: HOSPITAL | Age: 63
Setting detail: RECURRING SERIES
Discharge: HOME | End: 2023-02-17
Payer: MEDICARE

## 2023-02-14 PROCEDURE — 97530 THERAPEUTIC ACTIVITIES: CPT

## 2023-02-14 PROCEDURE — 97110 THERAPEUTIC EXERCISES: CPT

## 2023-02-14 NOTE — PROGRESS NOTES
PHYSICAL / OCCUPATIONAL THERAPY - DAILY TREATMENT NOTE (updated )    Patient Name: Cayla Denney    Date: 2023    : 1960  Insurance: Payor: Calixto Arana / Plan: [address] [place] [address] / Product Type: *No Product type* /      Patient  verified Yes     Visit #   Current / Total 6 8   Time   In / Out 9:02 10:02   Pain   In / Out 5/10 5/10   Subjective Functional Status/Changes: My neck and back are both hurting pretty much equally today, but I have been feeling some burning and tingling pain down both of my arms and legs at night for some reason since I was here last.    Changes to:  Meds, Allergies, Med Hx, Sx Hx? If yes, update Summary List no       TREATMENT AREA =  Cervicalgia [M54.2]  Other low back pain [M54.59]    OBJECTIVE    Modalities Rationale:     decrease pain and increase tissue extensibility to improve patient's ability to progress to PLOF and address remaining functional goals. min [] Estim Unattended, type/location:                                      []  w/ice    []  w/heat    min [] Estim Attended, type/location:                                     []  w/US     []  w/ice    []  w/heat    []  TENS insruct      min []  Mechanical Traction: type/lbs                   []  pro   []  sup   []  int   []  cont    []  before manual    []  after manual    min []  Ultrasound, settings/location:      min []  Iontophoresis w/ dexamethasone, location:                                               []  take home patch       []  in clinic   10 min  unbilled []  Ice     [x]  Heat    location/position: Cervical/lumbar in sitting     min []  Paraffin,  details:     min []  Vasopneumatic Device, press/temp:     min []  Rk Peyton / Walda Dancer:     If using vaso (only need to measure limb vaso being performed on)      pre-treatment girth :       post-treatment girth :       measured at (landmark location) :      min []  Other:    Skin assessment post-treatment (if applicable):    [x]  intact    [x] redness- no adverse reaction                 []redness - adverse reaction:         Therapeutic Procedures: Tx Min Billable or 1:1 Min (if diff from Tx Min) Procedure, Rationale, Specifics   27 13 [id number] Therapeutic Exercise (timed):  increase ROM, strength, coordination, balance, and proprioception to improve patient's ability to progress to PLOF and address remaining functional goals. (see flow sheet as applicable)     Details if applicable:       23 15 [id number] Therapeutic Activity (timed):  use of dynamic activities replicating functional movements to increase ROM, strength, coordination, balance, and proprioception in order to improve patient's ability to progress to PLOF and address remaining functional goals. (see flow sheet as applicable)     Details if applicable:            Details if applicable:            Details if applicable:            Details if applicable:     50 29 [facility] BC Totals Reminder: bill using total billable min of TIMED therapeutic procedures (example: do not include dry needle or estim unattended, both untimed codes, in totals to left)  8-22 min = 1 unit; 23-37 min = 2 units; 38-52 min = 3 units; 53-67 min = 4 units; 68-82 min = 5 units   Total Total     [x]  Patient Education billed concurrently with other procedures   [] Review HEP    [] Progressed/Changed HEP, detail:    [] Other detail:       Objective Information/Functional Measures/Assessment  Verbal cueing/demonstration for proper form/technique with various exercises/activities during treatment.     Patient will continue to benefit from skilled PT / OT services to modify and progress therapeutic interventions, analyze and address functional mobility deficits, analyze and address ROM deficits, analyze and address strength deficits, analyze and address soft tissue restrictions, analyze and cue for proper movement patterns, analyze and modify for postural abnormalities, and instruct in home and community integration to address functional deficits and attain remaining goals. Progress toward goals / Updated goals:  [x]  See Progress Note/Recertification  See progress note/physician update for full detailed progress towards all established goals.    PLAN  Yes  Continue plan of care  [x]  Upgrade activities as tolerated  []  Discharge due to :  []  Other:    Tristan Flores PTA    2/14/2023    8:58 AM    Future Appointments   Date Time Provider Milli Halle   2/14/2023  9:00 AM Tristan Flores PTA [facility]   2/17/2023  8:30 AM [facility] PT CHILLED POND 1 [facility]   2/20/2023  9:30 AM [facility] PT CHILLED POND 1 [facility]   2/22/2023  9:00 AM Sana Perea, PT [facility]   2/23/2023 10:20 AM MADINA Winslow [facility] Sched   2/27/2023  9:30 AM Adelita Cha, PT [facility]   6/26/2023  9:45 AM MD Michelle Shepherd
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
## 2023-02-17 ENCOUNTER — HOSPITAL ENCOUNTER (OUTPATIENT)
Facility: HOSPITAL | Age: 63
Setting detail: RECURRING SERIES
Discharge: HOME | End: 2023-02-20
Payer: MEDICARE

## 2023-02-17 PROCEDURE — 97140 MANUAL THERAPY 1/> REGIONS: CPT

## 2023-02-17 PROCEDURE — 97530 THERAPEUTIC ACTIVITIES: CPT

## 2023-02-17 PROCEDURE — 97110 THERAPEUTIC EXERCISES: CPT
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
## 2023-02-20 ENCOUNTER — HOSPITAL ENCOUNTER (OUTPATIENT)
Facility: HOSPITAL | Age: 63
Setting detail: RECURRING SERIES
Discharge: HOME | End: 2023-02-23
Payer: MEDICARE

## 2023-02-20 ENCOUNTER — APPOINTMENT (OUTPATIENT)
Facility: HOSPITAL | Age: 63
End: 2023-02-20
Payer: MEDICARE

## 2023-02-20 PROCEDURE — 97110 THERAPEUTIC EXERCISES: CPT

## 2023-02-20 PROCEDURE — 97140 MANUAL THERAPY 1/> REGIONS: CPT

## 2023-02-20 PROCEDURE — 97530 THERAPEUTIC ACTIVITIES: CPT
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
## 2023-02-22 ENCOUNTER — HOSPITAL ENCOUNTER (OUTPATIENT)
Facility: HOSPITAL | Age: 63
Setting detail: RECURRING SERIES
Discharge: HOME | End: 2023-02-25
Payer: MEDICARE

## 2023-02-22 PROCEDURE — 97112 NEUROMUSCULAR REEDUCATION: CPT

## 2023-02-22 PROCEDURE — 97140 MANUAL THERAPY 1/> REGIONS: CPT

## 2023-02-22 PROCEDURE — 97110 THERAPEUTIC EXERCISES: CPT
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
## 2023-02-27 ENCOUNTER — APPOINTMENT (OUTPATIENT)
Facility: HOSPITAL | Age: 63
End: 2023-02-27
Payer: MEDICARE

## 2023-08-08 NOTE — IP AVS SNAPSHOT
[facility] 
 
 
 [address] Patient: Jeri Myers MRN: [id number] XAT:2/3/5997 You are allergic to the following No active allergies Recent Documentation Height Weight BMI Smoking Status 1.676 m 83.5 kg 29.7 kg/m2 Current Every Day Smoker Emergency Contacts Name Discharge Info Relation Home Work Mobile Peg Bennett DISCHARGE CAREGIVER [3] Spouse [3] [phone] About your hospitalization You were admitted on:  June 14, 2017 You last received care in the:  [facility] PHASE 2 RECOVERY You were discharged on:  June 14, 2017 Unit phone number:  [phone] Why you were hospitalized Your primary diagnosis was:  Not on File Providers Seen During Your Hospitalizations Provider Role Specialty Primary office phone Torrie Aldridge MD Attending Provider Urology [phone] Your Primary Care Physician (PCP) Primary Care Physician Office Phone Office Fax Dwayne Cooks [phone] Follow-up Information Follow up With Details Comments Contact Info Celena Richter DO   [address] [facility] [address] [address] 
[phone] Torrie Aldridge MD Go on 6/20/2017  [facility] [place] [facility] [address] [address] 
[phone] Your Appointments Friday June 23, 2017  9:00 AM EDT Office Visit with Torrie Aldridge MD  
[facility] [address] [place] [address]) [facility] [place] [facility] [address] [address]  
[phone] Current Discharge Medication List  
  
START taking these medications Dose & Instructions Dispensing Information Comments Morning Noon Evening Bedtime  
 ciprofloxacin HCl 500 mg tablet Commonly known as:  CIPRO Your last dose was: Your next dose is: Dose:  500 mg Take 1 Tab by mouth two (2) times a day for 7 days. Quantity:  14 Tab Refills:  0  
     
   
   
   
  
 docusate sodium 100 mg capsule Commonly known as:  Humberto Holliday Your last dose was: Your next dose is:    
   
   
 Dose:  100 mg Take 1 Cap by mouth two (2) times a day for 90 days. Quantity:  28 Cap Refills:  2 HYDROcodone-acetaminophen 5-325 mg per tablet Commonly known as:  José Luis Martinez Your last dose was: Your next dose is:    
   
   
 Dose:  1 Tab Take 1 Tab by mouth every four (4) hours as needed for Pain. Max Daily Amount: 6 Tabs. Quantity:  24 Tab Refills:  0 CONTINUE these medications which have NOT CHANGED Dose & Instructions Dispensing Information Comments Morning Noon Evening Bedtime  
 gabapentin 800 mg tablet Commonly known as:  NEURONTIN Your last dose was: Your next dose is:    
   
   
 Dose:  800 mg  
800 mg two (2) times a day. Refills:  3  
     
   
   
   
  
 losartan 50 mg tablet Commonly known as:  COZAAR Your last dose was: Your next dose is:    
   
   
 Dose:  50 mg  
50 mg daily. Refills:  1  
     
   
   
   
  
 naproxen 500 mg tablet Commonly known as:  NAPROSYN Your last dose was: Your next dose is:    
   
   
 Dose:  500 mg  
500 mg two (2) times daily (with meals). Refills:  0 Where to Get Your Medications These medications were sent to [id number] [place] [address], [address] [place] [address], [address] [place] [address] Phone:  [phone]  
  ciprofloxacin HCl 500 mg tablet  
 docusate sodium 100 mg capsule Information on where to get these meds will be given to you by the nurse or doctor. ! Ask your nurse or doctor about these medications HYDROcodone-acetaminophen 5-325 mg per tablet Discharge Instructions Cystoscopy: What to Expect at [facility] Your Recovery A cystoscopy is a procedure that lets a doctor look inside of the bladder and the urethra. The urethra is the tube that carries urine from the bladder to outside the body. The doctor uses a thin, lighted tool called a cystoscope. Your bladder is filled with fluid. This stretches the bladder so that your doctor can look closely at the inside of your bladder. After the cystoscopy, your urethra may be sore at first, and it may burn when you urinate for the first few days after the procedure. You may feel the need to urinate more often, and your urine may be pink. These symptoms should get better in 1 or 2 days. You will probably be able to go back to most of your usual activities in 1 or 2 days. This care sheet gives you a general idea about how long it will take for you to recover. But each person recovers at a different pace. Follow the steps below to get better as quickly as possible. How can you care for yourself at home? Activity · Rest when you feel tired. Getting enough sleep will help you recover. · Try to walk each day. Start by walking a little more than you did the day before. Bit by bit, increase the amount you walk. Walking boosts blood flow and helps prevent pneumonia and constipation. · Avoid strenuous activities, such as bicycle riding, jogging, weight lifting, or aerobic exercise, until your doctor says it is okay. · Ask your doctor when you can drive again. · Most people are able to return to work within 1 or 2 days after the procedure. · You may shower and take baths as usual. 
· Ask your doctor when it is okay for you to have sex. Diet · You can eat your normal diet. If your stomach is upset, try bland, low-fat foods like plain rice, broiled chicken, toast, and yogurt. · Drink plenty of fluids (unless your doctor tells you not to). Medicines · Take pain medicines exactly as directed. ¨ If the doctor gave you a prescription medicine for pain, take it as prescribed. ¨ If you are not taking a prescription pain medicine, ask your doctor if you can take an over-the-counter medicine. · If you think your pain medicine is making you sick to your stomach: 
¨ Take your medicine after meals (unless your doctor has told you not to). ¨ Ask your doctor for a different pain medicine. · If your doctor prescribed antibiotics, take them as directed. Do not stop taking them just because you feel better. You need to take the full course of antibiotics. Follow-up care is a key part of your treatment and safety. Be sure to make and go to all appointments, and call your doctor if you are having problems. It's also a good idea to know your test results and keep a list of the medicines you take. When should you call for help? Call 911 anytime you think you may need emergency care. For example, call if: 
· You passed out (lost consciousness). · You have severe trouble breathing. · You have sudden chest pain and shortness of breath, or you cough up blood. · You have severe belly pain. Call your doctor now or seek immediate medical care if: 
· You are sick to your stomach or cannot keep fluids down. · Your urine is still red or you see blood clots after you have urinated several times. · You have trouble passing urine or stool, especially if you have pain or swelling in your lower belly. · You have signs of a blood clot, such as: 
¨ Pain in your calf, back of the knee, thigh, or groin. ¨ Redness and swelling in your leg or groin. · You develop a fever or severe chills. · You have pain in your back just below your rib cage. This is called flank pain. Watch closely for changes in your health, and be sure to contact your doctor if: 
· You have pain or burning when you urinate. A burning feeling is normal for a day or two after the test, but call if it does not get better. · You have a frequent urge to urinate but can pass only small amounts of urine. · Your urine is pink, red, or cloudy, or smells bad. It is normal for the urine to have a pinkish color for a few days after the test, but call if it does not get better. Where can you learn more? Go to http://tramaine-alis.info/. Enter U461 in the search box to learn more about \"Cystoscopy: What to Expect at Home. \" Current as of: November 11, 2016 Content Version: 11.2 © 8487-0103 [employer]. Care instructions adapted under license by bookjam (which disclaims liability or warranty for this information). If you have questions about a medical condition or this instruction, always ask your healthcare professional. [address] any warranty or liability for your use of this information. Learning About Urinary Catheter Care to Prevent Infection What is a urinary catheter? A urinary catheter is a flexible plastic tube used to drain urine from your bladder when you can't urinate on your own. The catheter allows urine to drain from the bladder into a bag. Two types of drainage bags may be used with a urinary catheter. · A bedside bag is a large bag that you can hang on the side of your bed or on a chair. You can use it overnight or anytime you will be sitting or lying down for a long time. · A leg bag is a small bag that you can use during the day. It is usually attached to your thigh or calf and hidden under your clothes. Having a urinary catheter increases your risk of getting a urinary tract infection. Germs may get on the catheter and cause an infection in your bladder or kidneys. The longer you have a catheter, the more likely it is that you will get an infection. You can help prevent this problem with good hygiene and careful handling of your catheter and drainage bags. How can you help prevent infection? Take care to be clean · Always wash your hands well before and after you handle your catheter. · Clean the skin around the catheter twice a day using soap and water. Dry with a clean towel afterward. You can shower with your catheter and drainage bag in place unless your doctor told you not to. · When you clean around the catheter, check the surrounding skin for signs of infection. Look for things like pus or irritated, swollen, red, or tender skin around the catheter. Be careful with your drainage bag · Always keep the drainage bag below the level of your bladder. This will help keep urine from flowing back into your bladder. · Check often to see that urine is flowing through the catheter into the drainage bag. · Empty the drainage bag when it is half full. This will keep it from overflowing or backing up. · When you empty the drainage bag, do not let the tubing or drain spout touch anything. Be careful with your catheter · Do not unhook the catheter from the drain tube. That could let germs get into the tube. · Make sure that the catheter tubing does not get twisted or kinked. · Do not tug or pull on the catheter. And make sure that the drainage bag does not drag or pull on the catheter. · Do not put powder or lotion on the skin around the catheter. · Do not have sexual intercourse while wearing a catheter. How do you empty a urine drainage bag? . 
If your doctor has asked you to keep a record, write down the amount of urine in the bag before you empty it. Wash your hands before and after you touch the bag. 1. Remove the drain spout from its sleeve at the bottom of the drainage bag. 
2. Open the valve on the drain spout. Let the urine flow out into the toilet or a container. Be careful not to let the tubing or drain spout touch anything. 3. After you empty the bag, wipe off any liquid on the end of the drain spout. Close the valve. Then put the drain spout back into its sleeve at the bottom of the collection bag. How do you change from a bedside bag to a leg bag? Wash your hands before and after you handle the bags. 1. Empty the bag attached to the catheter. 2. Put a clean towel under the catheter where it connects to the bag. 
3. Fold and pinch the catheter closed to keep urine from leaking out. Many catheters have a clip you can use to pinch the tube closed. 4. Remove the used bag from the catheter. 5. Use an alcohol wipe to clean the tip of the leg bag. Then connect the leg bag to the catheter. 6. Strap the leg bag to your thigh or calf. Be sure the straps are not too tight. How can you clean a drainage bag? Clean your bags every day. Many people clean their bedside bag in the morning when they switch to a leg bag. At night, they attach the bedside bag and clean the leg bag. To clean a drainage ba. Remove the bag from the catheter. 2. Fill the bag with 2 parts vinegar and 3 parts water. Let it stand for 20 minutes. 3. Empty the bag, and let it air dry. When should you call for help? Call your doctor now or seek immediate medical care if: 
· You have symptoms of a urinary infection. These may include: 
¨ Pain or burning when you urinate. ¨ A frequent need to urinate without being able to pass much urine. ¨ Pain in the flank, which is just below the rib cage and above the waist on either side of the back. ¨ Blood in your urine. ¨ A fever. · Your urine smells bad. · You see large blood clots in your urine. · No urine or very little urine is flowing into the bag for 4 or more hours. Watch closely for changes in your health, and be sure to contact your doctor if: · The area around the catheter becomes irritated, swollen, red, or tender, or there is pus draining from it. · Urine is leaking from the place where the catheter enters your body. Follow-up care is a key part of your treatment and safety.  Be sure to make and go to all appointments, and call your doctor if you are having problems. It's also a good idea to know your test results and keep a list of the medicines you take. Where can you learn more? Go to http://tramaine-alis.info/. Enter C910 in the search box to learn more about \"Learning About Urinary Catheter Care to Prevent Infection. \" Current as of: August 12, 2016 Content Version: 11.2 © 8737-6204 Senzari. Care instructions adapted under license by Rattle (which disclaims liability or warranty for this information). If you have questions about a medical condition or this instruction, always ask your healthcare professional. [place] [address] any warranty or liability for your use of this information. DISCHARGE SUMMARY from Nurse The following personal items are in your possession at time of discharge: 
 
Dental Appliances: None Visual Aid: Glasses Jewelry: None Clothing: Pants, Shirt, Undergarments, Footwear Other Valuables: Cell Phone, [employer] PATIENT INSTRUCTIONS: 
 
After general anesthesia or intravenous sedation, for 24 hours or while taking prescription Narcotics: · Limit your activities · Do not drive and operate hazardous machinery · Do not make important personal or business decisions · Do  not drink alcoholic beverages · If you have not urinated within 8 hours after discharge, please contact your surgeon on call. Report the following to your surgeon: 
· Excessive pain, swelling, redness or odor of or around the surgical area · Temperature over 100.5 · Nausea and vomiting lasting longer than 4 hours or if unable to take medications · Any signs of decreased circulation or nerve impairment to extremity: change in color, persistent  numbness, tingling, coldness or increase pain · Any questions *  Please give a list of your current medications to your Primary Care Provider.  
 
*  Please update this list whenever your medications are discontinued, doses are 
 changed, or new medications (including over-the-counter products) are added. *  Please carry medication information at all times in case of emergency situations. These are general instructions for a healthy lifestyle: No smoking/ No tobacco products/ Avoid exposure to second hand smoke Surgeon General's Warning:  Quitting smoking now greatly reduces serious risk to your health. Obesity, smoking, and sedentary lifestyle greatly increases your risk for illness A healthy diet, regular physical exercise & weight monitoring are important for maintaining a healthy lifestyle You may be retaining fluid if you have a history of heart failure or if you experience any of the following symptoms:  Weight gain of 3 pounds or more overnight or 5 pounds in a week, increased swelling in our hands or feet or shortness of breath while lying flat in bed. Please call your doctor as soon as you notice any of these symptoms; do not wait until your next office visit. Recognize signs and symptoms of STROKE: 
 
F-face looks uneven A-arms unable to move or move unevenly S-speech slurred or non-existent T-time-call 911 as soon as signs and symptoms begin-DO NOT go Back to bed or wait to see if you get better-TIME IS BRAIN. Warning Signs of HEART ATTACK Call 911 if you have these symptoms: 
? Chest discomfort. Most heart attacks involve discomfort in the center of the chest that lasts more than a few minutes, or that goes away and comes back. It can feel like uncomfortable pressure, squeezing, fullness, or pain. ? Discomfort in other areas of the upper body. Symptoms can include pain or discomfort in one or both arms, the back, neck, jaw, or stomach. ? Shortness of breath with or without chest discomfort. ? Other signs may include breaking out in a cold sweat, nausea, or lightheadedness. Don't wait more than five minutes to call [email or web address]!  Fast action can save your life. Calling 911 is almost always the fastest way to get lifesaving treatment. Emergency Medical Services staff can begin treatment when they arrive  up to an hour sooner than if someone gets to the hospital by car. The discharge information has been reviewed with the patient and spouse. The patient and spouse verbalized understanding. Discharge medications reviewed with the patient and spouse and appropriate educational materials and side effects teaching were provided. Laxative, Stool Softeners (Doculax, Colace, Colace Clear, DSS) - (By mouth) Why this medicine is used:  
Treats constipation by helping you have a bowel movement. Contact a nurse or doctor right away if you have: · Dark urine or pale stools · Vomiting, loss of appetite, stomach pain · Yellow skin or eyes Common side effects: 
· Nausea, diarrhea, stomach cramps, bitter taste in mouth © 2017 [address] Information is for End User's use only and may not be sold, redistributed or otherwise used for commercial purposes. Ciprofloxacin (By mouth) Ciprofloxacin (nin-jtm-HKKC-a-sin) Treats infections and plague. This medicine is a quinolone antibiotic. Brand Name(s): Cipro There may be other brand names for this medicine. When This Medicine Should Not Be Used: This medicine is not right for everyone. Do not use it if you had an allergic reaction to ciprofloxacin or to similar medicines. How to Use This Medicine:  
Liquid, Tablet, Long Acting Tablet · Your doctor will tell you how much medicine to use. Do not use more than directed. Take this medicine at the same time each day. · You may take this medicine with or without food. Do not take this medicine with only a source of calcium, such as milk, yogurt, or juice that contains added calcium.  You may have foods or drinks that contain calcium as part of a larger meal. 
 · Swallow the extended-release tablet whole. Do not crush, break, or chew it. · Oral liquid: Shake for at least 15 seconds just before each use. The liquid has small beads floating in it. Do not chew the beads when you drink the liquid. Measure the oral liquid medicine with a marked measuring spoon, oral syringe, or medicine cup. · Tablet: Swallow whole. Do not break, crush, or chew it. · Drink extra fluids so you will urinate more often and help prevent kidney problems. · Take all of the medicine in your prescription to clear up your infection, even if you feel better after the first few doses. · This medicine should come with a Medication Guide. Ask your pharmacist for a copy if you do not have one. · Missed dose: Take a dose as soon as you remember. If it is almost time for your next dose, wait until then and take a regular dose. Do not take extra medicine to make up for a missed dose. · Store the medicine in a closed container at room temperature, away from heat, moisture, and direct light. Throw away any leftover liquid medicine after 14 days. Drugs and Foods to Avoid: Ask your doctor or pharmacist before using any other medicine, including over-the-counter medicines, vitamins, and herbal products. · Do not use this medicine together with tizanidine. · Some foods and medicines can affect how ciprofloxacin works. Tell your doctor if you are using any of the following: ¨ Clozapine, cyclosporine, duloxetine, lidocaine, methotrexate, olanzapine, pentoxifylline, phenytoin, probenecid, ropinirole, sildenafil, theophylline ¨ Antibiotic (including azithromycin, clarithromycin, erythromycin) ¨ Blood thinner (including warfarin) ¨ Diabetes medicine (including glimepiride, glyburide) ¨ Medicine for depression or mental illness ¨ Medicine for heart rhythm problems (including amiodarone, procainamide, quinidine, sotalol) ¨ NSAID pain medicine (including aspirin, celecoxib, diclofenac, ibuprofen, naproxen) ¨ Steroid medicine (including hydrocortisone, methylprednisolone, prednisone) · Take ciprofloxacin at least 2 hours before or 6 hours after you take antacids containing aluminum or magnesium, calcium, zinc, iron, lanthanum, sevelamer, sucralfate, and didanosine. This includes vitamin/mineral supplements. · This medicine slows the digestion of caffeine, so it might affect you for longer than normal. 
Warnings While Using This Medicine: · Tell your doctor if you are pregnant or breastfeeding, or if you have kidney disease, liver disease, diabetes, heart disease, myasthenia gravis, or a history of heart rhythm problems (such as prolonged QT interval) or seizures. Tell your doctor if you have ever had tendon or joint problems, including rheumatoid arthritis, or if you have received a transplant. · This medicine may cause the following problems: 
¨ Tendinitis and tendon rupture (may happen after treatment ends) ¨ Liver damage ¨ Nerve damage in the arms or legs ¨ Heart rhythm changes ¨ Changes in blood sugar levels · This medicine may make you dizzy, drowsy, or lightheaded. Do not drive or do anything that could be dangerous until you know how this medicine affects you. · This medicine can cause diarrhea. Call your doctor if the diarrhea becomes severe, does not stop, or is bloody. Do not take any medicine to stop diarrhea until you have talked to your doctor. Diarrhea can occur 2 months or more after you stop taking this medicine. · This medicine may make your skin more sensitive to sunlight. Wear sunscreen. Do not use sunlamps or tanning beds. · Call your doctor if your symptoms do not improve or if they get worse. · Keep all medicine out of the reach of children. Never share your medicine with anyone. Possible Side Effects While Using This Medicine:  
Call your doctor right away if you notice any of these side effects: · Allergic reaction: Itching or hives, swelling in your face or hands, swelling or tingling in your mouth or throat, chest tightness, trouble breathing · Blistering, peeling, red skin rash · Dark-colored urine or pale stools, nausea, vomiting, loss of appetite, pain in your upper stomach, yellow skin or eyes · Diarrhea that may contain blood · Fainting, dizziness, or lightheadedness · Fast, slow, or uneven heartbeat · Numbness, tingling, weakness, or burning pain in your hands, arms, legs, or feet · Pain, stiffness, swelling, or bruises around your ankle, leg, shoulder, or other joint · Seizures, severe headache, unusual thoughts or behaviors, trouble sleeping, feeling anxious, confused, or depressed, seeing, hearing, or feeling things that are not there · Unusual bleeding, bruising, or weakness If you notice other side effects that you think are caused by this medicine, tell your doctor. Call your doctor for medical advice about side effects. You may report side effects to FDA at 0-573-FDA-8353 © 2017 [address]  Information is for End User's use only and may not be sold, redistributed or otherwise used for commercial purposes. The above information is an  only. It is not intended as medical advice for individual conditions or treatments. Talk to your doctor, nurse or pharmacist before following any medical regimen to see if it is safe and effective for you. Hydrocodone/Acetaminophen (By mouth) Acetaminophen (a-rkuy-d-MIN-oh-fen), Hydrocodone Bitartrate (nyv-qlqd-WWS-done bye-TAR-trate) Treats pain. This medicine contains a narcotic pain reliever. Brand Name(s): Hycet, Lorcet, Lorcet HD, Lorcet Plus, Lortab 10/325, Lortab 5/325, Lortab 7.5/325, Lortab Elixir, Norco, Verdrocet, Vicodin, Vicodin ES, Vicodin HP, Xodol, Xodol 5/300 There may be other brand names for this medicine. When This Medicine Should Not Be Used: This medicine is not right for everyone. Do not use it if you had an allergic reaction to acetaminophen, hydrocodone, or other narcotic medicines, or stomach or bowel blockage (including paralytic ileus). How to Use This Medicine:  
Capsule, Liquid, Tablet · Your doctor will tell you how much medicine to use. Do not use more than directed. · An overdose can be dangerous. Follow directions carefully so you do not get too much medicine at one time. · Oral liquid: Measure the oral liquid medicine with a marked measuring spoon, oral syringe, or medicine cup. · Drink plenty of liquids to help avoid constipation. · This medicine should come with a Medication Guide. Ask your pharmacist for a copy if you do not have one. · Missed dose: Take a dose as soon as you remember. If it is almost time for your next dose, wait until then and take a regular dose. Do not take extra medicine to make up for a missed dose. · Store the medicine in a closed container at room temperature, away from heat, moisture, and direct light. Flush any unused Norco® tablets down the toilet. Drugs and Foods to Avoid: Ask your doctor or pharmacist before using any other medicine, including over-the-counter medicines, vitamins, and herbal products. · Do not use this medicine if you are using or have used an MAO inhibitor within the past 14 days. · Some medicines can affect how hydrocodone/acetaminophen works. Tell your doctor if you are using any of the following: ¨ Carbamazepine, erythromycin, ketoconazole, mirtazapine, phenytoin, rifampin, ritonavir, tramadol, trazodone ¨ Diuretic (water pill) ¨ Medicine to treat depression or mental health problems ¨ Medicine to treat migraine headaches ¨ Phenothiazine medicine · Tell your doctor if you use anything else that makes you sleepy. Some examples are allergy medicine, narcotic pain medicine, and alcohol.  Tell your doctor if you are using buprenorphine, butorphanol, nalbuphine, pentazocine, or a muscle relaxer. · Do not drink alcohol while you are using this medicine. Acetaminophen can damage your liver, and your risk is higher if you also drink alcohol. Warnings While Using This Medicine: · Tell your doctor if you are pregnant or breastfeeding, or if you have kidney disease, liver disease, lung or breathing problems, gallbladder or pancreas problems, an underactive thyroid, [place] disease, prostate problems, trouble urinating, stomach problems, or a history of head injury or brain tumor, seizures, alcohol or drug addiction. · This medicine may cause the following problems:  
¨ High risk of overdose, which can lead to death ¨ Respiratory depression (serious breathing problem that can be life-threatening) ¨ Liver problems ¨ Serious skin reactions ¨ Serotonin syndrome (when used with certain medicines) · This medicine can be habit-forming. Do not use more than your prescribed dose. Call your doctor if you think your medicine is not working. · This medicine may make you dizzy or drowsy. Do not drive or doing anything else that could be dangerous until you know how this medicine affects you. · This medicine contains acetaminophen. Read the labels of all other medicines you are using to see if they also contain acetaminophen, or ask your doctor or pharmacist. So Men not use more than 4 grams (4,000 milligrams) total of acetaminophen in one day. · Tell any doctor or dentist who treats you that you are using this medicine. This medicine may affect certain medical test results. · This medicine may cause constipation, especially with long-term use. Ask your doctor if you should use a laxative to prevent and treat constipation. · This medicine could cause infertility. Talk with your doctor before using this medicine if you plan to have children. · Keep all medicine out of the reach of children. Never share your medicine with anyone. Possible Side Effects While Using This Medicine: Call your doctor right away if you notice any of these side effects: · Allergic reaction: Itching or hives, swelling in your face or hands, swelling or tingling in your mouth or throat, chest tightness, trouble breathing · Anxiety, restlessness, fast heartbeat, fever, sweating, muscle spasms, twitching, diarrhea, seeing or hearing things that are not there · Blistering, peeling, red skin rash · Blue lips, fingernails, or skin · Dark urine or pale stools, loss of appetite, nausea or vomiting, stomach pain, yellow skin or eyes · Extreme weakness, shallow breathing, slow heartbeat, sweating, seizures, cold or clammy skin · Lightheadedness, dizziness, fainting If you notice these less serious side effects, talk with your doctor: · Constipation, nausea, vomiting · Tiredness or sleepiness If you notice other side effects that you think are caused by this medicine, tell your doctor. Call your doctor for medical advice about side effects. You may report side effects to FDA at 9-125-FDA-6693 © 2017 [address]  Information is for End User's use only and may not be sold, redistributed or otherwise used for commercial purposes. The above information is an  only. It is not intended as medical advice for individual conditions or treatments. Talk to your doctor, nurse or pharmacist before following any medical regimen to see if it is safe and effective for you. Discharge Orders None Introducing [facility]! Yesica Jefferson introduces [email or web address] patient portal. Now you can access parts of your medical record, email your doctor's office, and request medication refills online. 1. In your internet browser, go to [email or web address] 2. Click on the First Time User? Click Here link in the Sign In box. You will see the New Member Sign Up page. 3. Enter your [email or web address] Access Code exactly as it appears below.  You will not need to use this code after youve completed the sign-up process. If you do not sign up before the expiration date, you must request a new code. · LearnBoost Access Code: [email or web address] Expires: 7/3/2017 12:59 PM 
 
4. Enter the last four digits of your Social Security Number (xxxx) and Date of Birth (mm/dd/yyyy) as indicated and click Submit. You will be taken to the next sign-up page. 5. Create a LearnBoost ID. This will be your LearnBoost login ID and cannot be changed, so think of one that is secure and easy to remember. 6. Create a LearnBoost password. You can change your password at any time. 7. Enter your Password Reset Question and Answer. This can be used at a later time if you forget your password. 8. Enter your e-mail address. You will receive e-mail notification when new information is available in [address] 9. Click Sign Up. You can now view and download portions of your medical record. 10. Click the Download Summary menu link to download a portable copy of your medical information. If you have questions, please visit the Frequently Asked Questions section of the LearnBoost website. Remember, LearnBoost is NOT to be used for urgent needs. For medical emergencies, dial 911. Now available from your iPhone and Android! General Information Please provide this summary of care documentation to your next provider. Patient Signature:  ____________________________________________________________ Date:  ____________________________________________________________  
  
Nupur Steiner Provider Signature:  ____________________________________________________________ Date:  ____________________________________________________________ Home

## 2024-01-08 NOTE — PATIENT INSTRUCTIONS
Fusion Dynamic Activation    Thank you for requesting access to Fusion [employer]. Please follow the instructions below to securely access and download your online medical record. Fusion Dynamic allows you to send messages to your doctor, view your test results, renew your prescriptions, schedule appointments, and more. How Do I Sign Up? 1. In your internet browser, go to [email or web address]. 2. Click on the First Time User? Click Here link in the Sign In box. You will see the New Member Sign Up page. 3. Enter your Fusion Dynamic Access Code exactly as it appears below. You will not need to use this code after youve completed the sign-up process. If you do not sign up before the expiration date, you must request a new code. Fusion Dynamic Access Code: [email or web address]  Expires: 7/3/2017 12:59 PM (This is the date your Fusion Dynamic access code will )    4. Enter the last four digits of your Social Security Number (xxxx) and Date of Birth (mm/dd/yyyy) as indicated and click Submit. You will be taken to the next sign-up page. 5. Create a Fusion Dynamic ID. This will be your Fusion Dynamic login ID and cannot be changed, so think of one that is secure and easy to remember. 6. Create a Fusion Dynamic password. You can change your password at any time. 7. Enter your Password Reset Question and Answer. This can be used at a later time if you forget your password. 8. Enter your e-mail address. You will receive e-mail notification when new information is available in [address] 9. Click Sign Up. You can now view and download portions of your medical record. 10. Click the Download Summary menu link to download a portable copy of your medical information. Additional Information    If you have questions, please visit the Frequently Asked Questions section of the Fusion Dynamic website at [email or web address] Remember, Fusion Dynamic is NOT to be used for urgent needs. For medical emergencies, dial 911.            Blood in the Urine: Patient Quality Outreach    Patient is due for the following:   Asthma  -  ACT needed    Next Steps:   Patient was assigned appropriate questionnaire to complete    Type of outreach:    Copy of ACT mailed to patient.      Questions for provider review:    None           Carlie Estrada MA         Care Instructions  Your Care Instructions  Blood in the urine, or hematuria, may make the urine look red, brown, or pink. There may be blood every time you urinate or just from time to time. You cannot always see blood in the urine, but it will show up in a urine test.  Blood in the urine may be serious. It should always be checked by a doctor. Your doctor may recommend more tests, including an X-ray, a CT scan, or a cystoscopy (which lets a doctor look inside the urethra and bladder). Blood in the urine can be a sign of another problem. Common causes are bladder infections and kidney stones. An injury to your groin or your genital area can also cause bleeding in the urinary tract. Very hard exercise--such as running a marathon--can cause blood in the urine. Blood in the urine can also be a sign of kidney disease or cancer in the bladder or kidney. Many cases of blood in the urine are caused by a harmless condition that runs in families. This is called benign familial hematuria. It does not need any treatment. Sometimes your urine may look red or brown even though it does not contain blood. For example, not getting enough fluids (dehydration), taking certain medicines, or having a liver problem can change the color of your urine. Eating foods such as beets, rhubarb, or blackberries or foods with red food coloring can make your urine look red or pink. Follow-up care is a key part of your treatment and safety. Be sure to make and go to all appointments, and call your doctor if you are having problems. It's also a good idea to know your test results and keep a list of the medicines you take. When should you call for help? Call your doctor now or seek immediate medical care if:  · You have symptoms of a urinary infection. For example:  ¨ You have pus in your urine. ¨ You have pain in your back just below your rib cage. This is called flank pain. ¨ You have a fever, chills, or body aches.   ¨ It hurts to urinate. ¨ You have groin or belly pain. · You have more blood in your urine. Watch closely for changes in your health, and be sure to contact your doctor if:  · You have new urination problems. · You do not get better as expected. Where can you learn more? Go to http://tramaine-alis.info/. Enter W966 in the search box to learn more about \"Blood in the Urine: Care Instructions. \"  Current as of: August 12, 2016  Content Version: 11.2  © 9762-3915 [employer]. Care instructions adapted under license by [employer] (which disclaims liability or warranty for this information). If you have questions about a medical condition or this instruction, always ask your healthcare professional. [address] any warranty or liability for your use of this information.

## 2024-05-12 NOTE — ED TRIAGE NOTES
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
.

## (undated) DEVICE — SOLUTION IRRIG 3000ML 0.9% SOD CHL FLX CONT 0797208] ICU MEDICAL INC]

## (undated) DEVICE — SOLUTION IV 1000ML 0.9% SOD CHL

## (undated) DEVICE — BAG DRAINAGE CUST DISP

## (undated) DEVICE — CATHETER URETH 18FR BLLN 5CC SIL ALLY W/ SIL HYDRGEL 2 W F

## (undated) DEVICE — (D)SYR 10ML 1/5ML GRAD NSAF -- PKGING CHANGE USE ITEM 338027

## (undated) DEVICE — GAUZE SPONGES,16 PLY: Brand: CURITY

## (undated) DEVICE — MEDI-VAC NON-CONDUCTIVE SUCTION TUBING: Brand: CARDINAL HEALTH

## (undated) DEVICE — Z DUP USE 2565107 PACK SURG PROC LEG CYSTO T-DRAPE REINF TBL CVR HND TWL

## (undated) DEVICE — 3M™ BAIR PAWS FLEX™ WARMING GOWN, STANDARD, 20 PER CASE 81003: Brand: BAIR PAWS™

## (undated) DEVICE — 4-PORT MANIFOLD: Brand: NEPTUNE 2

## (undated) DEVICE — STER SINGLE BASIN SET W/BOWLS: Brand: CARDINAL HEALTH

## (undated) DEVICE — EVACUATOR URO BLDR W/ ADPT UROVAC

## (undated) DEVICE — KENDALL SCD EXPRESS SLEEVES, KNEE LENGTH, MEDIUM: Brand: KENDALL SCD

## (undated) DEVICE — URINARY LEG BAG COMBINATION PACK: Brand: HOLLISTER

## (undated) DEVICE — Device: Brand: OLYMPUS

## (undated) DEVICE — Y-TYPE TUR/BLADDER IRRIGATION SET, REGULATING CLAMP

## (undated) DEVICE — SKIN MARKER,REGULAR TIP WITH RULER AND LABELS: Brand: DEVON

## (undated) DEVICE — KIT CLN UP BON SECOURS MARYV

## (undated) DEVICE — LUB SURG MEDC STRL 2OZ TUBE MC -- MEDICHOICE

## (undated) DEVICE — GOWN,PREVENTION PLUS,XLN/XL,ST,24/CS: Brand: MEDLINE